# Patient Record
Sex: FEMALE | Race: WHITE | NOT HISPANIC OR LATINO | Employment: OTHER | URBAN - METROPOLITAN AREA
[De-identification: names, ages, dates, MRNs, and addresses within clinical notes are randomized per-mention and may not be internally consistent; named-entity substitution may affect disease eponyms.]

---

## 2017-11-14 ENCOUNTER — OFFICE VISIT (OUTPATIENT)
Dept: SLEEP MEDICINE | Facility: HOSPITAL | Age: 62
End: 2017-11-14
Attending: INTERNAL MEDICINE

## 2017-11-14 VITALS
WEIGHT: 283 LBS | DIASTOLIC BLOOD PRESSURE: 73 MMHG | HEIGHT: 61 IN | SYSTOLIC BLOOD PRESSURE: 152 MMHG | HEART RATE: 75 BPM | BODY MASS INDEX: 53.43 KG/M2

## 2017-11-14 DIAGNOSIS — G47.33 OBSTRUCTIVE SLEEP APNEA, ADULT: Primary | ICD-10-CM

## 2017-11-14 DIAGNOSIS — I27.81 COR PULMONALE (HCC): ICD-10-CM

## 2017-11-14 DIAGNOSIS — I10 ESSENTIAL HYPERTENSION, MALIGNANT: ICD-10-CM

## 2017-11-14 PROCEDURE — G0463 HOSPITAL OUTPT CLINIC VISIT: HCPCS

## 2017-11-14 NOTE — PROGRESS NOTES
PULMONARY SLEEP CONSULT NOTE      PATIENT IDENTIFICATION:  Name: Scarlett Ray  Age: 62 y.o.  Sex: female  :  1955  MRN: [id number]    DATE OF CONSULTATION:  2017   Referring Physician: No admitting provider for patient encounter.                  CHIEF COMPLAINT: Obstructive Sleep Apnea    History of Present Illness:   Scarlett Ray is a 62 y.o. female  Pt on CPAP feeling better more energy especially the night he use it more than 4 hours, no sleepiness no fatigue no tiredness, no mask irritation no dryness, compliance report reviewed with pt AHI< 5 with good usage.       Review of Systems:   Constitutional: negative   Eyes: negative   ENT/oropharynx: negative   Cardiovascular: negative   Respiratory: negative   Gastrointestinal: negative   Genitourinary: negative   Neurological: negative   Musculoskeletal: negative   Integument/breast: negative   Endocrine: negative   Allergic/Immunologic: negative     Past Medical History:  No past medical history on file.    Past Surgical History:  No past surgical history on file.     Family History:  No family history on file.     Social History:   Social History   Substance Use Topics   • Smoking status: Not on file   • Smokeless tobacco: Not on file   • Alcohol use Not on file        Allergies:  Allergies not on file    Home Meds:    (Not in a hospital admission)    Objective:    Vitals Ranges:   Heart Rate:  [75] 75  BP: (152)/(73) 152/73  Body mass index is 54.36 kg/(m^2).         Exam:    General Appearance:      Head:  Normocephalic, without obvious abnormality, atraumatic.   Eyes:  Conjunctiva/corneas clear, EOM's intact, both eyes.         Ears:  Normal external ear canals, both ears.    Nose:  Nares normal, no drainage      Throat:  Lips, mucosa, and tongue normal. Mallampati score 3    Neck:  Supple, symmetrical, trachea midline. No JVD.  Lungs:   Bilateral basal rhonchi bilaterally, respirations unlabored symmetrical wall movement.    Chest wall:   No tenderness or deformity.    Heart:  Regular rate and rhythm, S1 and S2 normal.  Abdomen: Soft, non-tender, no masses, no organomegaly.    Extremities: Trace edema no clubbing or Cyanosis        Data Review:  All labs (24hrs): No results found for this or any previous visit (from the past 24 hour(s)).     Imaging:  [unfilled]    ASSESSMENT:  Diagnoses and all orders for this visit:    Obstructive sleep apnea, adult    Essential hypertension, malignant    Cor pulmonale        PLAN:   This is patient with symptoms of obstructive sleep apnea, NPSG study ASAP / split night study, Avoid supine avoid sedative meds in pm, weight loss, Avoid driving. Long discussion with patient about the physiology of NONI, and long term and short term benefit of treating NONI   Treating NONI will improve BP control  It is recommended to keep thyroid function optimized to improve quality of sleep        Car Garcia MD. D, ABSM.  11/14/2017  10:49 AM

## 2018-01-29 ENCOUNTER — TELEPHONE (OUTPATIENT)
Dept: SLEEP MEDICINE | Facility: HOSPITAL | Age: 63
End: 2018-01-29

## 2018-02-05 ENCOUNTER — TELEPHONE (OUTPATIENT)
Dept: SLEEP MEDICINE | Facility: HOSPITAL | Age: 63
End: 2018-02-05

## 2018-02-06 NOTE — TELEPHONE ENCOUNTER
Pt left vm stating to return call. Tech called pt per pt [facility] is out of network with insurance and so is [facility]. Pt requested to send supply order over to [facility]. MAB

## 2018-04-30 ENCOUNTER — HOSPITAL ENCOUNTER (OUTPATIENT)
Dept: ULTRASOUND IMAGING | Facility: HOSPITAL | Age: 63
Discharge: HOME | End: 2018-04-30

## 2018-04-30 ENCOUNTER — TRANSCRIBE ORDERS (OUTPATIENT)
Dept: ADMINISTRATIVE | Facility: HOSPITAL | Age: 63
End: 2018-04-30

## 2018-04-30 DIAGNOSIS — R60.9 EDEMA, UNSPECIFIED TYPE: Primary | ICD-10-CM

## 2018-04-30 DIAGNOSIS — R60.9 EDEMA, UNSPECIFIED TYPE: ICD-10-CM

## 2018-04-30 PROCEDURE — 93971 EXTREMITY STUDY: CPT

## 2018-11-27 ENCOUNTER — OFFICE VISIT (OUTPATIENT)
Dept: SLEEP MEDICINE | Facility: HOSPITAL | Age: 63
End: 2018-11-27
Attending: INTERNAL MEDICINE

## 2018-11-27 VITALS
DIASTOLIC BLOOD PRESSURE: 83 MMHG | HEART RATE: 82 BPM | HEIGHT: 62 IN | WEIGHT: 262 LBS | SYSTOLIC BLOOD PRESSURE: 144 MMHG | BODY MASS INDEX: 48.21 KG/M2

## 2018-11-27 DIAGNOSIS — I10 ESSENTIAL HYPERTENSION, MALIGNANT: ICD-10-CM

## 2018-11-27 DIAGNOSIS — G47.33 OBSTRUCTIVE SLEEP APNEA, ADULT: Primary | ICD-10-CM

## 2018-11-27 DIAGNOSIS — I27.81 COR PULMONALE (HCC): ICD-10-CM

## 2018-11-27 PROCEDURE — G0463 HOSPITAL OUTPT CLINIC VISIT: HCPCS

## 2018-11-27 NOTE — PROGRESS NOTES
PULMONARY SLEEP CONSULT NOTE      PATIENT IDENTIFICATION:  Name: Scarlett Ray  Age: 63 y.o.  Sex: female  :  1955  MRN: [id number]    DATE OF CONSULTATION:  2018   Referring Physician: No admitting provider for patient encounter.                  CHIEF COMPLAINT: Obstructive Sleep Apnea    History of Present Illness:   Scarlett Ray is a 63 y.o. female Pt on CPAP feeling better more energy especially the night he use it more than 4 hours, no sleepiness no fatigue no tiredness, no mask irritation no dryness, compliance report reviewed with pt AHI< 5 with good usage.       Review of Systems:   Constitutional: negative   Eyes: negative   ENT/oropharynx: negative   Cardiovascular: negative   Respiratory: negative   Gastrointestinal: negative   Genitourinary: negative   Neurological: negative   Musculoskeletal: negative   Integument/breast: negative   Endocrine: negative   Allergic/Immunologic: negative     Past Medical History:  No past medical history on file.    Past Surgical History:  No past surgical history on file.     Family History:  No family history on file.     Social History:   Social History     Tobacco Use   • Smoking status: Not on file   Substance Use Topics   • Alcohol use: Not on file        Allergies:  Allergies not on file    Home Meds:    (Not in a hospital admission)    Objective:    Vitals Ranges:   Heart Rate:  [82] 82  BP: (144)/(83) 144/83  Body mass index is 47.92 kg/m².     Exam:  General Appearance:  WDWN    HEENT:   without obvious abnormality,  Conjunctiva/corneas clear,  Normal external ear canals, no drainage    Clear orsalmucosa,  Mallampati score 3    Neck:  Supple, symmetrical, trachea midline. No JVD.  Lungs:   Bilateral basal rhonchi bilaterally, respirations unlabored symmetrical wall movement.    Chest wall:  No tenderness or deformity.    Heart:  Regular rate and rhythm, S1 and S2 normal.  Extremities: Trace edema no clubbing or Cyanosis        Data  Review:  All labs (24hrs): No results found for this or any previous visit (from the past 24 hour(s)).     Imaging:  [unfilled]    ASSESSMENT:  Diagnoses and all orders for this visit:    Obstructive sleep apnea, adult    Cor pulmonale (CMS/HCC)    Essential hypertension, malignant        PLAN:  This patient with obstructive sleep apnea, compliance is improved. Encourage to use it more frequent, I re-emphasized on pt the long and short term benefit of treating NONI.     Treating NONI will improve BP control    Car Garcia MD. D, ABSM.  11/27/2018  11:48 AM

## 2019-09-13 ENCOUNTER — OFFICE VISIT (OUTPATIENT)
Dept: RETAIL CLINIC | Facility: CLINIC | Age: 64
End: 2019-09-13

## 2019-09-13 VITALS
HEART RATE: 92 BPM | TEMPERATURE: 98.6 F | SYSTOLIC BLOOD PRESSURE: 150 MMHG | RESPIRATION RATE: 18 BRPM | OXYGEN SATURATION: 94 % | DIASTOLIC BLOOD PRESSURE: 80 MMHG

## 2019-09-13 DIAGNOSIS — J40 BRONCHITIS: Primary | ICD-10-CM

## 2019-09-13 PROCEDURE — 99213 OFFICE O/P EST LOW 20 MIN: CPT | Performed by: NURSE PRACTITIONER

## 2019-09-13 RX ORDER — DEXTROMETHORPHAN HYDROBROMIDE AND PROMETHAZINE HYDROCHLORIDE 15; 6.25 MG/5ML; MG/5ML
5 SYRUP ORAL NIGHTLY PRN
Qty: 50 ML | Refills: 0 | Status: SHIPPED | OUTPATIENT
Start: 2019-09-13 | End: 2019-09-23

## 2019-09-13 RX ORDER — PREDNISONE 20 MG/1
20 TABLET ORAL 2 TIMES DAILY
Qty: 10 TABLET | Refills: 0 | Status: SHIPPED | OUTPATIENT
Start: 2019-09-13 | End: 2019-09-18

## 2019-09-13 RX ORDER — GUAIFENESIN 600 MG/1
1200 TABLET, EXTENDED RELEASE ORAL EVERY MORNING
Qty: 20 TABLET | Refills: 0
Start: 2019-09-13 | End: 2019-09-23

## 2019-09-13 RX ORDER — METOPROLOL TARTRATE 100 MG/1
100 TABLET ORAL DAILY
Refills: 2 | Status: ON HOLD | COMMUNITY
Start: 2019-08-26 | End: 2022-05-04 | Stop reason: SDUPTHER

## 2019-09-13 RX ORDER — ALLOPURINOL 300 MG/1
300 TABLET ORAL DAILY
COMMUNITY
End: 2020-12-12

## 2019-09-13 RX ORDER — AMOXICILLIN 875 MG/1
TABLET, COATED ORAL
Refills: 0 | COMMUNITY
Start: 2019-08-27 | End: 2020-12-12

## 2019-09-13 RX ORDER — SPIRONOLACTONE 25 MG/1
25 TABLET ORAL DAILY
Refills: 2 | COMMUNITY
Start: 2019-08-26 | End: 2022-05-04 | Stop reason: HOSPADM

## 2019-09-13 RX ORDER — ALBUTEROL SULFATE 90 UG/1
2 AEROSOL, METERED RESPIRATORY (INHALATION) EVERY 6 HOURS PRN
Refills: 0 | COMMUNITY
Start: 2019-08-22

## 2019-09-13 RX ORDER — AZITHROMYCIN 250 MG/1
TABLET, FILM COATED ORAL
Qty: 6 TABLET | Refills: 0 | Status: SHIPPED | OUTPATIENT
Start: 2019-09-13 | End: 2020-12-12

## 2019-09-13 RX ORDER — INFLUENZA A VIRUS A/SINGAPORE/GP1908/2015 IVR-180 (H1N1) ANTIGEN (MDCK CELL DERIVED, PROPIOLACTONE INACTIVATED), INFLUENZA A VIRUS A/NORTH CAROLINA/04/2016 (H3N2) HEMAGGLUTININ ANTIGEN (MDCK CELL DERIVED, PROPIOLACTONE INACTIVATED), INFLUENZA B VIRUS B/IOWA/06/2017 HEMAGGLUTININ ANTIGEN (MDCK CELL DERIVED, PROPIOLACTONE INACTIVATED), INFLUENZA B VIRUS B/SINGAPORE/INFTT-16-0610/2016 HEMAGGLUTININ ANTIGEN (MDCK CELL DERIVED, PROPIOLACTONE INACTIVATED) 15; 15; 15; 15 UG/.5ML; UG/.5ML; UG/.5ML; UG/.5ML
INJECTION, SUSPENSION INTRAMUSCULAR
COMMUNITY
Start: 2019-09-12 | End: 2019-09-13

## 2019-09-13 RX ORDER — SIMVASTATIN 20 MG
20 TABLET ORAL
Refills: 2 | COMMUNITY
Start: 2019-08-26 | End: 2022-05-04 | Stop reason: HOSPADM

## 2019-09-13 NOTE — PROGRESS NOTES
"Elyse Ray is a 63 y.o. female.     Cough   This is a new problem. Episode onset: 3 weeks ago. The problem has been gradually worsening (in past week). The problem occurs every few minutes. The cough is productive of sputum. Associated symptoms include a fever (not measured but reports feeling \"hot and cold\" in past week ), headaches, nasal congestion, postnasal drip, rhinorrhea and a sore throat. Pertinent negatives include no chest pain, chills, ear congestion, ear pain, eye redness, heartburn, hemoptysis, myalgias, shortness of breath, sweats or wheezing. The symptoms are aggravated by lying down. She has tried a beta-agonist inhaler (pt finished amoxicillin course 9/6 prescribed by PCP-her symptoms did not improve with use, she has been using her albuterol inhaler daily x7 days with mild/temporary improvement of cough) for the symptoms. The treatment provided mild relief. Her past medical history is significant for asthma and bronchitis. There is no history of COPD, environmental allergies or pneumonia. Sleep apnea-has not been wearing CPAP at  for 2-3 months       The following portions of the patient's history were reviewed and updated as appropriate: allergies, current medications, past medical history, past social history, past surgical history and problem list.    Review of Systems   Constitutional: Positive for fatigue and fever (not measured but reports feeling \"hot and cold\" in past week ). Negative for appetite change, chills and diaphoresis.   HENT: Positive for congestion, postnasal drip, rhinorrhea, sinus pressure and sore throat. Negative for ear discharge, ear pain, mouth sores, sneezing, trouble swallowing and voice change.    Eyes: Negative for redness and itching.   Respiratory: Positive for cough and chest tightness. Negative for hemoptysis, shortness of breath and wheezing.    Cardiovascular: Negative for chest pain.   Gastrointestinal: Negative for diarrhea, heartburn, " nausea and vomiting.   Musculoskeletal: Negative for myalgias and neck pain.   Allergic/Immunologic: Positive for immunocompromised state. Negative for environmental allergies.   Neurological: Positive for headaches. Negative for dizziness.       Objective   Physical Exam   Constitutional: She is oriented to person, place, and time. She is cooperative. She does not appear ill. No distress.   HENT:   Right Ear: Tympanic membrane, external ear and ear canal normal.   Left Ear: Tympanic membrane, external ear and ear canal normal.   Nose: Rhinorrhea present. No mucosal edema. Right sinus exhibits no maxillary sinus tenderness and no frontal sinus tenderness. Left sinus exhibits no maxillary sinus tenderness and no frontal sinus tenderness.   Mouth/Throat: Uvula is midline and mucous membranes are normal. No oral lesions. No uvula swelling. Posterior oropharyngeal erythema present. No oropharyngeal exudate or posterior oropharyngeal edema. Tonsils are 1+ on the right. Tonsils are 1+ on the left. No tonsillar exudate.   Eyes: Conjunctivae and lids are normal.   Cardiovascular: Normal rate, regular rhythm, S1 normal and S2 normal.   Pulmonary/Chest: Effort normal. She has decreased breath sounds in the right middle field, the right lower field, the left middle field and the left lower field. She has no wheezes. She has rhonchi in the left upper field. She has no rales.   Coughing during deep breathing exercises  Rhonchi resolved after forceful cough   Lymphadenopathy:     She has no cervical adenopathy.   Neurological: She is alert and oriented to person, place, and time.   Vitals reviewed.      Assessment/Plan   Scarlett was seen today for cough and sore throat.    Diagnoses and all orders for this visit:    Bronchitis  -     predniSONE (DELTASONE) 20 MG tablet; Take 1 tablet by mouth 2 (Two) Times a Day for 5 days.  -     azithromycin (ZITHROMAX Z-JOSIAH) 250 MG tablet; Take 2 tablets the first day, then 1 tablet daily for 4  days.  -     promethazine-dextromethorphan (PROMETHAZINE-DM) 6.25-15 MG/5ML syrup; Take 5 mL by mouth At Night As Needed for Cough for up to 10 days.  -     guaiFENesin (MUCINEX) 600 MG 12 hr tablet; Take 2 tablets by mouth Every Morning for 10 days.          -     Discussed to use promethazine-DM sparingly, check BP and glucose more frequently while on prednisone, increase H2O intake with mucinex use        -     Albuterol 2 puffs Q4 hours while awake x 48 hours then resume PRN frequency        -     F/U with PCP for persistent symptoms or UC/ER for worsening symptoms

## 2020-03-11 ENCOUNTER — TRANSCRIBE ORDERS (OUTPATIENT)
Dept: ADMINISTRATIVE | Facility: HOSPITAL | Age: 65
End: 2020-03-11

## 2020-03-11 DIAGNOSIS — Z78.0 MENOPAUSE: Primary | ICD-10-CM

## 2020-03-18 ENCOUNTER — APPOINTMENT (OUTPATIENT)
Dept: BONE DENSITY | Facility: HOSPITAL | Age: 65
End: 2020-03-18

## 2020-03-18 DIAGNOSIS — Z78.0 MENOPAUSE: ICD-10-CM

## 2020-03-18 PROCEDURE — 77080 DXA BONE DENSITY AXIAL: CPT

## 2021-10-02 ENCOUNTER — APPOINTMENT (OUTPATIENT)
Dept: GENERAL RADIOLOGY | Facility: HOSPITAL | Age: 66
End: 2021-10-02

## 2021-10-02 ENCOUNTER — HOSPITAL ENCOUNTER (EMERGENCY)
Facility: HOSPITAL | Age: 66
Discharge: HOME | End: 2021-10-02
Attending: EMERGENCY MEDICINE | Admitting: EMERGENCY MEDICINE

## 2021-10-02 VITALS
HEIGHT: 63 IN | HEART RATE: 105 BPM | OXYGEN SATURATION: 95 % | BODY MASS INDEX: 46.42 KG/M2 | RESPIRATION RATE: 18 BRPM | DIASTOLIC BLOOD PRESSURE: 101 MMHG | TEMPERATURE: 99 F | SYSTOLIC BLOOD PRESSURE: 191 MMHG | WEIGHT: 262 LBS

## 2021-10-02 DIAGNOSIS — J20.9 ACUTE BRONCHITIS, UNSPECIFIED ORGANISM: Primary | ICD-10-CM

## 2021-10-02 LAB
ALBUMIN SERPL-MCNC: 4.3 G/DL (ref 3.5–5.2)
ALBUMIN/GLOB SERPL: 1.3 G/DL
ALP SERPL-CCNC: 219 U/L (ref 39–117)
ALT SERPL W P-5'-P-CCNC: 8 U/L (ref 1–33)
ANION GAP SERPL CALCULATED.3IONS-SCNC: 11.8 MMOL/L (ref 5–15)
AST SERPL-CCNC: 13 U/L (ref 1–32)
BASOPHILS # BLD AUTO: 0.05 10*3/MM3 (ref 0–0.2)
BASOPHILS NFR BLD AUTO: 0.4 % (ref 0–1.5)
BILIRUB SERPL-MCNC: 0.4 MG/DL (ref 0–1.2)
BUN SERPL-MCNC: 14 MG/DL (ref 8–23)
BUN/CREAT SERPL: 12.8 (ref 7–25)
CALCIUM SPEC-SCNC: 9.8 MG/DL (ref 8.6–10.5)
CHLORIDE SERPL-SCNC: 99 MMOL/L (ref 98–107)
CO2 SERPL-SCNC: 24.2 MMOL/L (ref 22–29)
CREAT SERPL-MCNC: 1.09 MG/DL (ref 0.57–1)
D DIMER PPP FEU-MCNC: 0.47 MCGFEU/ML (ref 0–0.46)
DEPRECATED RDW RBC AUTO: 43.8 FL (ref 37–54)
EOSINOPHIL # BLD AUTO: 0.39 10*3/MM3 (ref 0–0.4)
EOSINOPHIL NFR BLD AUTO: 3.3 % (ref 0.3–6.2)
ERYTHROCYTE [DISTWIDTH] IN BLOOD BY AUTOMATED COUNT: 13.5 % (ref 12.3–15.4)
FLUAV AG NPH QL: NEGATIVE
FLUBV AG NPH QL IA: NEGATIVE
GFR SERPL CREATININE-BSD FRML MDRD: 50 ML/MIN/1.73
GLOBULIN UR ELPH-MCNC: 3.4 GM/DL
GLUCOSE SERPL-MCNC: 134 MG/DL (ref 65–99)
HCT VFR BLD AUTO: 45.9 % (ref 34–46.6)
HGB BLD-MCNC: 14.7 G/DL (ref 12–15.9)
IMM GRANULOCYTES # BLD AUTO: 0.03 10*3/MM3 (ref 0–0.05)
IMM GRANULOCYTES NFR BLD AUTO: 0.3 % (ref 0–0.5)
LYMPHOCYTES # BLD AUTO: 1.67 10*3/MM3 (ref 0.7–3.1)
LYMPHOCYTES NFR BLD AUTO: 14.3 % (ref 19.6–45.3)
MCH RBC QN AUTO: 28.1 PG (ref 26.6–33)
MCHC RBC AUTO-ENTMCNC: 32 G/DL (ref 31.5–35.7)
MCV RBC AUTO: 87.8 FL (ref 79–97)
MONOCYTES # BLD AUTO: 1.05 10*3/MM3 (ref 0.1–0.9)
MONOCYTES NFR BLD AUTO: 9 % (ref 5–12)
NEUTROPHILS NFR BLD AUTO: 72.7 % (ref 42.7–76)
NEUTROPHILS NFR BLD AUTO: 8.52 10*3/MM3 (ref 1.7–7)
NT-PROBNP SERPL-MCNC: 106.5 PG/ML (ref 0–900)
PLATELET # BLD AUTO: 281 10*3/MM3 (ref 140–450)
PMV BLD AUTO: 10.3 FL (ref 6–12)
POTASSIUM SERPL-SCNC: 4.3 MMOL/L (ref 3.5–5.2)
PROCALCITONIN SERPL-MCNC: 0.09 NG/ML (ref 0–0.25)
PROT SERPL-MCNC: 7.7 G/DL (ref 6–8.5)
QT INTERVAL: 355 MS
RBC # BLD AUTO: 5.23 10*6/MM3 (ref 3.77–5.28)
SARS-COV-2 RNA PNL SPEC NAA+PROBE: NOT DETECTED
SODIUM SERPL-SCNC: 135 MMOL/L (ref 136–145)
TROPONIN T SERPL-MCNC: <0.01 NG/ML (ref 0–0.03)
WBC # BLD AUTO: 11.71 10*3/MM3 (ref 3.4–10.8)

## 2021-10-02 PROCEDURE — 85025 COMPLETE CBC W/AUTO DIFF WBC: CPT | Performed by: EMERGENCY MEDICINE

## 2021-10-02 PROCEDURE — 83880 ASSAY OF NATRIURETIC PEPTIDE: CPT | Performed by: EMERGENCY MEDICINE

## 2021-10-02 PROCEDURE — 93010 ELECTROCARDIOGRAM REPORT: CPT | Performed by: INTERNAL MEDICINE

## 2021-10-02 PROCEDURE — 84484 ASSAY OF TROPONIN QUANT: CPT | Performed by: EMERGENCY MEDICINE

## 2021-10-02 PROCEDURE — 85379 FIBRIN DEGRADATION QUANT: CPT | Performed by: EMERGENCY MEDICINE

## 2021-10-02 PROCEDURE — 63710000001 PREDNISONE PER 1 MG: Performed by: EMERGENCY MEDICINE

## 2021-10-02 PROCEDURE — 80053 COMPREHEN METABOLIC PANEL: CPT | Performed by: EMERGENCY MEDICINE

## 2021-10-02 PROCEDURE — 87804 INFLUENZA ASSAY W/OPTIC: CPT | Performed by: EMERGENCY MEDICINE

## 2021-10-02 PROCEDURE — 99282 EMERGENCY DEPT VISIT SF MDM: CPT | Performed by: EMERGENCY MEDICINE

## 2021-10-02 PROCEDURE — 71046 X-RAY EXAM CHEST 2 VIEWS: CPT

## 2021-10-02 PROCEDURE — 87635 SARS-COV-2 COVID-19 AMP PRB: CPT | Performed by: EMERGENCY MEDICINE

## 2021-10-02 PROCEDURE — 93005 ELECTROCARDIOGRAM TRACING: CPT | Performed by: EMERGENCY MEDICINE

## 2021-10-02 PROCEDURE — 99283 EMERGENCY DEPT VISIT LOW MDM: CPT

## 2021-10-02 PROCEDURE — 84145 PROCALCITONIN (PCT): CPT | Performed by: EMERGENCY MEDICINE

## 2021-10-02 RX ORDER — PREDNISONE 20 MG/1
60 TABLET ORAL ONCE
Status: COMPLETED | OUTPATIENT
Start: 2021-10-02 | End: 2021-10-02

## 2021-10-02 RX ORDER — AZITHROMYCIN 250 MG/1
TABLET, FILM COATED ORAL
Qty: 6 TABLET | Refills: 0 | Status: ON HOLD | OUTPATIENT
Start: 2021-10-02 | End: 2022-04-28

## 2021-10-02 RX ORDER — METHYLPREDNISOLONE 4 MG/1
TABLET ORAL
Qty: 21 TABLET | Refills: 0 | Status: ON HOLD | OUTPATIENT
Start: 2021-10-02 | End: 2022-04-28

## 2021-10-02 RX ORDER — BENZONATATE 100 MG/1
100 CAPSULE ORAL ONCE
Status: COMPLETED | OUTPATIENT
Start: 2021-10-02 | End: 2021-10-02

## 2021-10-02 RX ORDER — BENZONATATE 200 MG/1
200 CAPSULE ORAL 3 TIMES DAILY PRN
Qty: 30 CAPSULE | Refills: 0 | Status: ON HOLD | OUTPATIENT
Start: 2021-10-02 | End: 2022-04-30

## 2021-10-02 RX ADMIN — PREDNISONE 60 MG: 20 TABLET ORAL at 11:55

## 2021-10-02 RX ADMIN — BENZONATATE 100 MG: 100 CAPSULE ORAL at 11:55

## 2021-10-02 NOTE — ED PROVIDER NOTES
Subjective   History of Present Illness  History of Present Illness    Chief complaint: Coughing    Location: Home    Quality/Severity: Productive    Timing/Onset/Duration: 2 weeks ago    Modifying Factors: Nothing makes it better    Associated Symptoms: Mild headache.  T-max of 100.9 last night, no earache.  The patient has a mild sore throat.  No nasal congestion.  No chest pain.  No abdominal pain.  No diarrhea or burning when she urinates.  No nausea or vomiting.    Narrative: This 65-year-old white female presents with fever and coughing.  She has a history of bronchitis.  She is not a smoker.  She has a history of asthma.  She was vaccinated with Sae & Sae in March    PCP:Jeffry Espino MD    Review of Systems   Constitutional: Positive for chills and fever.   HENT: Positive for sore throat. Negative for congestion and ear pain.    Respiratory: Positive for shortness of breath.    Cardiovascular: Negative for chest pain.   Gastrointestinal: Negative for abdominal pain, diarrhea, nausea and vomiting.   Genitourinary: Negative for difficulty urinating.   Skin: Negative for rash.   Neurological: Positive for headaches. Negative for weakness and numbness.        Medication List      ASK your doctor about these medications    albuterol sulfate  (90 Base) MCG/ACT inhaler  Commonly known as: PROVENTIL HFA;VENTOLIN HFA;PROAIR HFA     carbamide peroxide 6.5 % otic solution  Commonly known as: DEBROX  Administer 5 drops into both ears 2 (Two) Times a Day.     METFORMIN HCL PO     metoprolol tartrate 100 MG tablet  Commonly known as: LOPRESSOR     simvastatin 20 MG tablet  Commonly known as: ZOCOR     spironolactone 25 MG tablet  Commonly known as: ALDACTONE            Past Medical History:   Diagnosis Date   • Allergic    • Asthma    • Elevated cholesterol    • Hypertension    • Type 2 diabetes mellitus (HCC)        Allergies   Allergen Reactions   • Ace Inhibitors Anaphylaxis   • Latex Hives        History reviewed. No pertinent surgical history.    History reviewed. No pertinent family history.    Social History     Socioeconomic History   • Marital status:      Spouse name: Not on file   • Number of children: Not on file   • Years of education: Not on file   • Highest education level: Not on file   Tobacco Use   • Smoking status: Never Smoker   • Smokeless tobacco: Never Used   Vaping Use   • Vaping Use: Never used   Substance and Sexual Activity   • Alcohol use: No   • Drug use: No   • Sexual activity: Defer           Objective   Physical Exam  Vitals (The temperature is 99 °F, pulse 105, respirations 18, blood pressure 185/81, room air sat 95%) reviewed.   Constitutional:       Appearance: She is well-developed.   HENT:      Head: Normocephalic and atraumatic.      Mouth/Throat:      Mouth: Mucous membranes are moist.   Eyes:      Extraocular Movements: Extraocular movements intact.      Pupils: Pupils are equal, round, and reactive to light.   Neck:      Comments: No meningeal signs  Cardiovascular:      Rate and Rhythm: Normal rate and regular rhythm.      Heart sounds: Normal heart sounds. No murmur heard.   No friction rub. No gallop.    Pulmonary:      Effort: Pulmonary effort is normal.      Breath sounds: Normal breath sounds.   Chest:      Chest wall: No tenderness.   Abdominal:      General: Bowel sounds are normal.      Palpations: Abdomen is soft. There is no mass.      Tenderness: There is no abdominal tenderness. There is no guarding or rebound.   Musculoskeletal:         General: Normal range of motion.      Cervical back: Normal range of motion and neck supple.      Right lower leg: No edema.      Left lower leg: No edema.   Skin:     General: Skin is warm and dry.      Findings: No rash.   Neurological:      General: No focal deficit present.      Mental Status: She is alert and oriented to person, place, and time.      Cranial Nerves: No cranial nerve deficit.      Motor: No  weakness.         Procedures           ED Course  ED Course as of Oct 02 1143   Sat Oct 02, 2021   1140 The laboratory values reviewed by me.  The D-dimer is 0.47 the white blood cell count is 11.7.  The glucose is 134, the creatinine is 1.09.  The sodium is 135, the alk phos is 219, the GFR is 50.  The laboratory values are otherwise unremarkable    [RC]      ED Course User Index  [RC] Jeffry Matthew MD      10:50 EDT, 10/02/21: The EKG was read by me at 1032 and obtained at 1029.  The EKG shows a normal sinus rhythm with rate of 99.  There is a normal axis with no hypertrophy.  The VA, QRS, QT intervals are unremarkable.  There PACs.  There is no acute ST elevation or depression.            11:43 EDT, 10/02/21:  The patient was reassessed.  She has no new complaints.  Her vital signs reviewed and are stable.  Her blood pressure is 191/101.  The patient's not had any of her blood pressure medicines today lung exam: Clear to auscultation equal bilaterally    11:44 EDT, 10/02/21:  The patient's diagnosis of acute bronchitis was discussed with her.  The patient will be placed on Zithromax.  I will place her on a Medrol Dosepak.  She should use her inhaler as needed as directed for shortness of breath she should follow-up with Dr. Espino in 1 week.  She should return to the emergency department if there is worsening shortness of breath, chest pain, worse in any way at all the patient be given a prescription for Tessalon Perles all the patient question answered she will be discharged in good condition.  The patient has been advised to take her blood pressure medicines this morning          MDM    Final diagnoses:   Acute bronchitis, unspecified organism       ED Disposition  ED Disposition     None          No follow-up provider specified.       Medication List      No changes were made to your prescriptions during this visit.          Jeffry Matthew MD  10/02/21 1145

## 2021-10-02 NOTE — DISCHARGE INSTRUCTIONS
Follow-up with  in 1 week.  Return to the emergency department if there is worsening shortness of breath, chest pain, worsening way at all take your blood pressure medications as prescribed.  Use your inhaler as needed as directed for shortness of breath.

## 2022-03-14 ENCOUNTER — TRANSCRIBE ORDERS (OUTPATIENT)
Dept: ADMINISTRATIVE | Facility: HOSPITAL | Age: 67
End: 2022-03-14

## 2022-03-14 DIAGNOSIS — Z78.0 MENOPAUSE: Primary | ICD-10-CM

## 2022-04-28 ENCOUNTER — HOSPITAL ENCOUNTER (EMERGENCY)
Facility: HOSPITAL | Age: 67
Discharge: ANOTHER HOSPITAL | End: 2022-04-28
Attending: EMERGENCY MEDICINE | Admitting: EMERGENCY MEDICINE

## 2022-04-28 ENCOUNTER — APPOINTMENT (OUTPATIENT)
Dept: GENERAL RADIOLOGY | Facility: HOSPITAL | Age: 67
End: 2022-04-28

## 2022-04-28 ENCOUNTER — APPOINTMENT (OUTPATIENT)
Dept: CARDIOLOGY | Facility: HOSPITAL | Age: 67
End: 2022-04-28

## 2022-04-28 ENCOUNTER — HOSPITAL ENCOUNTER (INPATIENT)
Facility: HOSPITAL | Age: 67
LOS: 6 days | Discharge: HOME | End: 2022-05-04
Attending: INTERNAL MEDICINE

## 2022-04-28 VITALS
DIASTOLIC BLOOD PRESSURE: 99 MMHG | RESPIRATION RATE: 22 BRPM | SYSTOLIC BLOOD PRESSURE: 182 MMHG | HEIGHT: 62 IN | TEMPERATURE: 97.9 F | OXYGEN SATURATION: 94 % | WEIGHT: 262 LBS | HEART RATE: 70 BPM | BODY MASS INDEX: 48.21 KG/M2

## 2022-04-28 DIAGNOSIS — I20.0 UNSTABLE ANGINA: Primary | ICD-10-CM

## 2022-04-28 DIAGNOSIS — Z95.1 S/P CABG (CORONARY ARTERY BYPASS GRAFT): ICD-10-CM

## 2022-04-28 DIAGNOSIS — R07.9 CHEST PAIN, UNSPECIFIED TYPE: Primary | ICD-10-CM

## 2022-04-28 DIAGNOSIS — R09.02 HYPOXIA: ICD-10-CM

## 2022-04-28 DIAGNOSIS — I25.110 CORONARY ARTERY DISEASE INVOLVING NATIVE CORONARY ARTERY OF NATIVE HEART WITH UNSTABLE ANGINA PECTORIS: Primary | ICD-10-CM

## 2022-04-28 DIAGNOSIS — R07.9 CHEST PAIN, UNSPECIFIED TYPE: ICD-10-CM

## 2022-04-28 DIAGNOSIS — R93.1 ABNORMAL FINDINGS ON DIAGNOSTIC IMAGING OF HEART AND CORONARY CIRCULATION: ICD-10-CM

## 2022-04-28 LAB
ABO GROUP BLD: NORMAL
ALBUMIN SERPL-MCNC: 3.8 G/DL (ref 3.5–5.2)
ALBUMIN SERPL-MCNC: 4.1 G/DL (ref 3.5–5.2)
ALBUMIN/GLOB SERPL: 1.1 G/DL
ALBUMIN/GLOB SERPL: 1.2 G/DL
ALP SERPL-CCNC: 160 U/L (ref 39–117)
ALP SERPL-CCNC: 183 U/L (ref 39–117)
ALT SERPL W P-5'-P-CCNC: 10 U/L (ref 1–33)
ALT SERPL W P-5'-P-CCNC: 13 U/L (ref 1–33)
ANION GAP SERPL CALCULATED.3IONS-SCNC: 10.4 MMOL/L (ref 5–15)
ANION GAP SERPL CALCULATED.3IONS-SCNC: 12.5 MMOL/L (ref 5–15)
APTT PPP: 31.9 SECONDS (ref 22.7–35.4)
ARTERIAL PATENCY WRIST A: ABNORMAL
AST SERPL-CCNC: 14 U/L (ref 1–32)
AST SERPL-CCNC: 16 U/L (ref 1–32)
ATMOSPHERIC PRESS: 754.1 MMHG
BACTERIA UR QL AUTO: ABNORMAL /HPF
BASE EXCESS BLDA CALC-SCNC: 1.1 MMOL/L (ref 0–2)
BASOPHILS # BLD AUTO: 0.05 10*3/MM3 (ref 0–0.2)
BASOPHILS # BLD AUTO: 0.09 10*3/MM3 (ref 0–0.2)
BASOPHILS NFR BLD AUTO: 0.6 % (ref 0–1.5)
BASOPHILS NFR BLD AUTO: 0.8 % (ref 0–1.5)
BDY SITE: ABNORMAL
BH CV XLRA MEAS - DIST GSV CALF DIST LEFT: 0.16 CM
BH CV XLRA MEAS - DIST GSV CALF DIST RIGHT: 0.22 CM
BH CV XLRA MEAS - DIST GSV THIGH DIST LEFT: 0.29 CM
BH CV XLRA MEAS - DIST GSV THIGH DIST RIGHT: 0.32 CM
BH CV XLRA MEAS - DIST LSV CALF DIST LEFT: 0.16 CM
BH CV XLRA MEAS - DIST LSV CALF DIST RIGHT: 0.08 CM
BH CV XLRA MEAS - GSV ANKLE DIST LEFT: 0.14 CM
BH CV XLRA MEAS - GSV ANKLE DIST RIGHT: 0.24 CM
BH CV XLRA MEAS - GSV KNEE DIST LEFT: 0.36 CM
BH CV XLRA MEAS - GSV KNEE DIST RIGHT: 0.32 CM
BH CV XLRA MEAS - GSV ORIGIN DIST LEFT: 0.96 CM
BH CV XLRA MEAS - GSV ORIGIN DIST RIGHT: 0.46 CM
BH CV XLRA MEAS - MID GSV CALF LEFT: 0.21 CM
BH CV XLRA MEAS - MID GSV CALF RIGHT: 0.21 CM
BH CV XLRA MEAS - MID GSV THIGH  LEFT: 0.4 CM
BH CV XLRA MEAS - MID GSV THIGH  RIGHT: 0.3 CM
BH CV XLRA MEAS - MID LSV CALF DIST LEFT: 0.17 CM
BH CV XLRA MEAS - MID LSV CALF DIST RIGHT: 0.27 CM
BH CV XLRA MEAS - PROX GSV CALF DIST LEFT: 0.23 CM
BH CV XLRA MEAS - PROX GSV CALF DIST RIGHT: 0.26 CM
BH CV XLRA MEAS - PROX GSV THIGH  LEFT: 0.67 CM
BH CV XLRA MEAS - PROX GSV THIGH  RIGHT: 0.48 CM
BH CV XLRA MEAS - PROX LSV CALF DIST LEFT: 0.22 CM
BH CV XLRA MEAS - PROX LSV CALF DIST RIGHT: 0.2 CM
BH CV XLRA MEAS LEFT DIST CCA EDV: 9.35 CM/SEC
BH CV XLRA MEAS LEFT DIST CCA PSV: 90.6 CM/SEC
BH CV XLRA MEAS LEFT DIST ICA EDV: -17.2 CM/SEC
BH CV XLRA MEAS LEFT DIST ICA PSV: -50.6 CM/SEC
BH CV XLRA MEAS LEFT ICA/CCA RATIO: 0.81
BH CV XLRA MEAS LEFT MID ICA EDV: -23.3 CM/SEC
BH CV XLRA MEAS LEFT MID ICA PSV: -73.8 CM/SEC
BH CV XLRA MEAS LEFT PROX CCA EDV: 15.7 CM/SEC
BH CV XLRA MEAS LEFT PROX CCA PSV: 134 CM/SEC
BH CV XLRA MEAS LEFT PROX ECA EDV: 9.35 CM/SEC
BH CV XLRA MEAS LEFT PROX ECA PSV: 78.3 CM/SEC
BH CV XLRA MEAS LEFT PROX ICA EDV: -18.1 CM/SEC
BH CV XLRA MEAS LEFT PROX ICA PSV: -69.7 CM/SEC
BH CV XLRA MEAS LEFT PROX SCLA PSV: 177.2 CM/SEC
BH CV XLRA MEAS LEFT VERTEBRAL A EDV: -14.7 CM/SEC
BH CV XLRA MEAS LEFT VERTEBRAL A PSV: -40.3 CM/SEC
BH CV XLRA MEAS RIGHT DIST CCA EDV: 12.3 CM/SEC
BH CV XLRA MEAS RIGHT DIST CCA PSV: 71.6 CM/SEC
BH CV XLRA MEAS RIGHT DIST ICA EDV: -14.9 CM/SEC
BH CV XLRA MEAS RIGHT DIST ICA PSV: -55.3 CM/SEC
BH CV XLRA MEAS RIGHT ICA/CCA RATIO: -0.77
BH CV XLRA MEAS RIGHT MID ICA EDV: -12.4 CM/SEC
BH CV XLRA MEAS RIGHT MID ICA PSV: -57.8 CM/SEC
BH CV XLRA MEAS RIGHT PROX CCA EDV: 11.8 CM/SEC
BH CV XLRA MEAS RIGHT PROX CCA PSV: 76.2 CM/SEC
BH CV XLRA MEAS RIGHT PROX ECA EDV: 4.8 CM/SEC
BH CV XLRA MEAS RIGHT PROX ECA PSV: 75.1 CM/SEC
BH CV XLRA MEAS RIGHT PROX ICA EDV: 12.3 CM/SEC
BH CV XLRA MEAS RIGHT PROX ICA PSV: 39.9 CM/SEC
BH CV XLRA MEAS RIGHT PROX SCLA PSV: 112 CM/SEC
BH CV XLRA MEAS RIGHT VERTEBRAL A EDV: 5.35 CM/SEC
BH CV XLRA MEAS RIGHT VERTEBRAL A PSV: 36.3 CM/SEC
BILIRUB SERPL-MCNC: 0.3 MG/DL (ref 0–1.2)
BILIRUB SERPL-MCNC: 0.5 MG/DL (ref 0–1.2)
BILIRUB UR QL STRIP: NEGATIVE
BILIRUB UR QL STRIP: NEGATIVE
BLD GP AB SCN SERPL QL: NEGATIVE
BUN SERPL-MCNC: 11 MG/DL (ref 8–23)
BUN SERPL-MCNC: 14 MG/DL (ref 8–23)
BUN/CREAT SERPL: 12.6 (ref 7–25)
BUN/CREAT SERPL: 16.3 (ref 7–25)
CALCIUM SPEC-SCNC: 9.4 MG/DL (ref 8.6–10.5)
CALCIUM SPEC-SCNC: 9.5 MG/DL (ref 8.6–10.5)
CHLORIDE SERPL-SCNC: 103 MMOL/L (ref 98–107)
CHLORIDE SERPL-SCNC: 103 MMOL/L (ref 98–107)
CLARITY UR: CLEAR
CLARITY UR: CLEAR
CLOSE TME COLL+ADP + EPINEP PNL BLD: 95 % (ref 86–100)
CO2 SERPL-SCNC: 21.5 MMOL/L (ref 22–29)
CO2 SERPL-SCNC: 25.6 MMOL/L (ref 22–29)
COLOR UR: YELLOW
COLOR UR: YELLOW
CREAT SERPL-MCNC: 0.86 MG/DL (ref 0.57–1)
CREAT SERPL-MCNC: 0.87 MG/DL (ref 0.57–1)
DEPRECATED RDW RBC AUTO: 42.9 FL (ref 37–54)
DEPRECATED RDW RBC AUTO: 45 FL (ref 37–54)
EGFRCR SERPLBLD CKD-EPI 2021: 73.1 ML/MIN/1.73
EGFRCR SERPLBLD CKD-EPI 2021: 74.2 ML/MIN/1.73
EOSINOPHIL # BLD AUTO: 0.33 10*3/MM3 (ref 0–0.4)
EOSINOPHIL # BLD AUTO: 0.38 10*3/MM3 (ref 0–0.4)
EOSINOPHIL NFR BLD AUTO: 3.4 % (ref 0.3–6.2)
EOSINOPHIL NFR BLD AUTO: 3.8 % (ref 0.3–6.2)
ERYTHROCYTE [DISTWIDTH] IN BLOOD BY AUTOMATED COUNT: 13.4 % (ref 12.3–15.4)
ERYTHROCYTE [DISTWIDTH] IN BLOOD BY AUTOMATED COUNT: 14.1 % (ref 12.3–15.4)
GAS FLOW AIRWAY: 1.5 LPM
GLOBULIN UR ELPH-MCNC: 3.4 GM/DL
GLOBULIN UR ELPH-MCNC: 3.6 GM/DL
GLUCOSE BLDC GLUCOMTR-MCNC: 107 MG/DL (ref 70–130)
GLUCOSE BLDC GLUCOMTR-MCNC: 119 MG/DL (ref 70–130)
GLUCOSE BLDC GLUCOMTR-MCNC: 128 MG/DL (ref 70–130)
GLUCOSE SERPL-MCNC: 163 MG/DL (ref 65–99)
GLUCOSE SERPL-MCNC: 178 MG/DL (ref 65–99)
GLUCOSE UR STRIP-MCNC: NEGATIVE MG/DL
GLUCOSE UR STRIP-MCNC: NEGATIVE MG/DL
HCO3 BLDA-SCNC: 25.2 MMOL/L (ref 22–28)
HCT VFR BLD AUTO: 45.3 % (ref 34–46.6)
HCT VFR BLD AUTO: 45.5 % (ref 34–46.6)
HGB BLD-MCNC: 14.4 G/DL (ref 12–15.9)
HGB BLD-MCNC: 15.4 G/DL (ref 12–15.9)
HGB UR QL STRIP.AUTO: NEGATIVE
HGB UR QL STRIP.AUTO: NEGATIVE
HYALINE CASTS UR QL AUTO: ABNORMAL /LPF
IMM GRANULOCYTES # BLD AUTO: 0.02 10*3/MM3 (ref 0–0.05)
IMM GRANULOCYTES # BLD AUTO: 0.06 10*3/MM3 (ref 0–0.05)
IMM GRANULOCYTES NFR BLD AUTO: 0.2 % (ref 0–0.5)
IMM GRANULOCYTES NFR BLD AUTO: 0.5 % (ref 0–0.5)
INR PPP: 0.95 (ref 0.9–1.1)
KETONES UR QL STRIP: NEGATIVE
KETONES UR QL STRIP: NEGATIVE
LEUKOCYTE ESTERASE UR QL STRIP.AUTO: NEGATIVE
LEUKOCYTE ESTERASE UR QL STRIP.AUTO: NEGATIVE
LYMPHOCYTES # BLD AUTO: 1.93 10*3/MM3 (ref 0.7–3.1)
LYMPHOCYTES # BLD AUTO: 3 10*3/MM3 (ref 0.7–3.1)
LYMPHOCYTES NFR BLD AUTO: 22 % (ref 19.6–45.3)
LYMPHOCYTES NFR BLD AUTO: 26.7 % (ref 19.6–45.3)
MAGNESIUM SERPL-MCNC: 1.9 MG/DL (ref 1.6–2.4)
MAXIMAL PREDICTED HEART RATE: 153 BPM
MAXIMAL PREDICTED HEART RATE: 153 BPM
MCH RBC QN AUTO: 28.3 PG (ref 26.6–33)
MCH RBC QN AUTO: 28.6 PG (ref 26.6–33)
MCHC RBC AUTO-ENTMCNC: 31.6 G/DL (ref 31.5–35.7)
MCHC RBC AUTO-ENTMCNC: 34 G/DL (ref 31.5–35.7)
MCV RBC AUTO: 84 FL (ref 79–97)
MCV RBC AUTO: 89.6 FL (ref 79–97)
MODALITY: ABNORMAL
MONOCYTES # BLD AUTO: 0.62 10*3/MM3 (ref 0.1–0.9)
MONOCYTES # BLD AUTO: 0.89 10*3/MM3 (ref 0.1–0.9)
MONOCYTES NFR BLD AUTO: 7.1 % (ref 5–12)
MONOCYTES NFR BLD AUTO: 7.9 % (ref 5–12)
NEUTROPHILS NFR BLD AUTO: 5.82 10*3/MM3 (ref 1.7–7)
NEUTROPHILS NFR BLD AUTO: 6.81 10*3/MM3 (ref 1.7–7)
NEUTROPHILS NFR BLD AUTO: 60.7 % (ref 42.7–76)
NEUTROPHILS NFR BLD AUTO: 66.3 % (ref 42.7–76)
NITRITE UR QL STRIP: NEGATIVE
NITRITE UR QL STRIP: NEGATIVE
NRBC BLD AUTO-RTO: 0 /100 WBC (ref 0–0.2)
NT-PROBNP SERPL-MCNC: 344.4 PG/ML (ref 0–900)
PCO2 BLDA: 37.2 MM HG (ref 35–45)
PH BLDA: 7.44 PH UNITS (ref 7.35–7.45)
PH UR STRIP.AUTO: 7.5 [PH] (ref 4.5–8)
PH UR STRIP.AUTO: 8 [PH] (ref 5–8)
PLATELET # BLD AUTO: 321 10*3/MM3 (ref 140–450)
PLATELET # BLD AUTO: 356 10*3/MM3 (ref 140–450)
PMV BLD AUTO: 10.1 FL (ref 6–12)
PMV BLD AUTO: 10.1 FL (ref 6–12)
PO2 BLDA: 70.4 MM HG (ref 80–100)
POTASSIUM SERPL-SCNC: 4 MMOL/L (ref 3.5–5.2)
POTASSIUM SERPL-SCNC: 4.1 MMOL/L (ref 3.5–5.2)
PROT SERPL-MCNC: 7.4 G/DL (ref 6–8.5)
PROT SERPL-MCNC: 7.5 G/DL (ref 6–8.5)
PROT UR QL STRIP: ABNORMAL
PROT UR QL STRIP: NEGATIVE
PROTHROMBIN TIME: 12.6 SECONDS (ref 11.7–14.2)
QT INTERVAL: 385 MS
QT INTERVAL: 412 MS
RBC # BLD AUTO: 5.08 10*6/MM3 (ref 3.77–5.28)
RBC # BLD AUTO: 5.39 10*6/MM3 (ref 3.77–5.28)
RBC # UR STRIP: ABNORMAL /HPF
REF LAB TEST METHOD: ABNORMAL
RH BLD: NEGATIVE
SAO2 % BLDCOA: 94.5 % (ref 92–99)
SARS-COV-2 RNA PNL SPEC NAA+PROBE: NOT DETECTED
SODIUM SERPL-SCNC: 137 MMOL/L (ref 136–145)
SODIUM SERPL-SCNC: 139 MMOL/L (ref 136–145)
SP GR UR STRIP: 1.02 (ref 1–1.03)
SP GR UR STRIP: >=1.03 (ref 1–1.03)
SQUAMOUS #/AREA URNS HPF: ABNORMAL /HPF
STRESS TARGET HR: 130 BPM
STRESS TARGET HR: 130 BPM
T&S EXPIRATION DATE: NORMAL
TOTAL RATE: 18 BREATHS/MINUTE
TROPONIN T SERPL-MCNC: <0.01 NG/ML (ref 0–0.03)
UROBILINOGEN UR QL STRIP: ABNORMAL
UROBILINOGEN UR QL STRIP: NORMAL
WBC # UR STRIP: ABNORMAL /HPF
WBC NRBC COR # BLD: 11.23 10*3/MM3 (ref 3.4–10.8)
WBC NRBC COR # BLD: 8.77 10*3/MM3 (ref 3.4–10.8)

## 2022-04-28 PROCEDURE — 0 IOPAMIDOL PER 1 ML: Performed by: INTERNAL MEDICINE

## 2022-04-28 PROCEDURE — 99284 EMERGENCY DEPT VISIT MOD MDM: CPT

## 2022-04-28 PROCEDURE — 85576 BLOOD PLATELET AGGREGATION: CPT

## 2022-04-28 PROCEDURE — 86850 RBC ANTIBODY SCREEN: CPT

## 2022-04-28 PROCEDURE — 86901 BLOOD TYPING SEROLOGIC RH(D): CPT

## 2022-04-28 PROCEDURE — 71045 X-RAY EXAM CHEST 1 VIEW: CPT

## 2022-04-28 PROCEDURE — 93458 L HRT ARTERY/VENTRICLE ANGIO: CPT | Performed by: INTERNAL MEDICINE

## 2022-04-28 PROCEDURE — 99152 MOD SED SAME PHYS/QHP 5/>YRS: CPT | Performed by: INTERNAL MEDICINE

## 2022-04-28 PROCEDURE — 36600 WITHDRAWAL OF ARTERIAL BLOOD: CPT

## 2022-04-28 PROCEDURE — 80053 COMPREHEN METABOLIC PANEL: CPT | Performed by: EMERGENCY MEDICINE

## 2022-04-28 PROCEDURE — B2151ZZ FLUOROSCOPY OF LEFT HEART USING LOW OSMOLAR CONTRAST: ICD-10-PCS | Performed by: INTERNAL MEDICINE

## 2022-04-28 PROCEDURE — B2111ZZ FLUOROSCOPY OF MULTIPLE CORONARY ARTERIES USING LOW OSMOLAR CONTRAST: ICD-10-PCS | Performed by: INTERNAL MEDICINE

## 2022-04-28 PROCEDURE — 87635 SARS-COV-2 COVID-19 AMP PRB: CPT | Performed by: EMERGENCY MEDICINE

## 2022-04-28 PROCEDURE — C1894 INTRO/SHEATH, NON-LASER: HCPCS | Performed by: INTERNAL MEDICINE

## 2022-04-28 PROCEDURE — 93880 EXTRACRANIAL BILAT STUDY: CPT

## 2022-04-28 PROCEDURE — 93010 ELECTROCARDIOGRAM REPORT: CPT | Performed by: INTERNAL MEDICINE

## 2022-04-28 PROCEDURE — 87086 URINE CULTURE/COLONY COUNT: CPT

## 2022-04-28 PROCEDURE — C1769 GUIDE WIRE: HCPCS | Performed by: INTERNAL MEDICINE

## 2022-04-28 PROCEDURE — 85730 THROMBOPLASTIN TIME PARTIAL: CPT

## 2022-04-28 PROCEDURE — 99222 1ST HOSP IP/OBS MODERATE 55: CPT | Performed by: INTERNAL MEDICINE

## 2022-04-28 PROCEDURE — 86923 COMPATIBILITY TEST ELECTRIC: CPT

## 2022-04-28 PROCEDURE — 71046 X-RAY EXAM CHEST 2 VIEWS: CPT

## 2022-04-28 PROCEDURE — 83880 ASSAY OF NATRIURETIC PEPTIDE: CPT | Performed by: EMERGENCY MEDICINE

## 2022-04-28 PROCEDURE — 86900 BLOOD TYPING SEROLOGIC ABO: CPT

## 2022-04-28 PROCEDURE — 85025 COMPLETE CBC W/AUTO DIFF WBC: CPT | Performed by: EMERGENCY MEDICINE

## 2022-04-28 PROCEDURE — 25010000002 MIDAZOLAM PER 1 MG: Performed by: INTERNAL MEDICINE

## 2022-04-28 PROCEDURE — 82803 BLOOD GASES ANY COMBINATION: CPT

## 2022-04-28 PROCEDURE — 25010000002 FENTANYL CITRATE (PF) 50 MCG/ML SOLUTION: Performed by: INTERNAL MEDICINE

## 2022-04-28 PROCEDURE — 63710000001 DIPHENHYDRAMINE PER 50 MG

## 2022-04-28 PROCEDURE — 25010000002 HEPARIN (PORCINE) PER 1000 UNITS: Performed by: INTERNAL MEDICINE

## 2022-04-28 PROCEDURE — 84484 ASSAY OF TROPONIN QUANT: CPT | Performed by: EMERGENCY MEDICINE

## 2022-04-28 PROCEDURE — 93005 ELECTROCARDIOGRAM TRACING: CPT

## 2022-04-28 PROCEDURE — 85610 PROTHROMBIN TIME: CPT

## 2022-04-28 PROCEDURE — 93005 ELECTROCARDIOGRAM TRACING: CPT | Performed by: EMERGENCY MEDICINE

## 2022-04-28 PROCEDURE — 81001 URINALYSIS AUTO W/SCOPE: CPT | Performed by: EMERGENCY MEDICINE

## 2022-04-28 PROCEDURE — 83735 ASSAY OF MAGNESIUM: CPT

## 2022-04-28 PROCEDURE — 81003 URINALYSIS AUTO W/O SCOPE: CPT

## 2022-04-28 PROCEDURE — 99284 EMERGENCY DEPT VISIT MOD MDM: CPT | Performed by: EMERGENCY MEDICINE

## 2022-04-28 PROCEDURE — C9803 HOPD COVID-19 SPEC COLLECT: HCPCS | Performed by: EMERGENCY MEDICINE

## 2022-04-28 PROCEDURE — 82962 GLUCOSE BLOOD TEST: CPT

## 2022-04-28 PROCEDURE — 85025 COMPLETE CBC W/AUTO DIFF WBC: CPT

## 2022-04-28 PROCEDURE — 4A023N7 MEASUREMENT OF CARDIAC SAMPLING AND PRESSURE, LEFT HEART, PERCUTANEOUS APPROACH: ICD-10-PCS | Performed by: INTERNAL MEDICINE

## 2022-04-28 PROCEDURE — 80053 COMPREHEN METABOLIC PANEL: CPT

## 2022-04-28 PROCEDURE — B24BZZ4 ULTRASONOGRAPHY OF HEART WITH AORTA, TRANSESOPHAGEAL: ICD-10-PCS | Performed by: INTERNAL MEDICINE

## 2022-04-28 PROCEDURE — 93970 EXTREMITY STUDY: CPT

## 2022-04-28 RX ORDER — CEFAZOLIN SODIUM 2 G/100ML
2 INJECTION, SOLUTION INTRAVENOUS
Status: COMPLETED | OUTPATIENT
Start: 2022-04-29 | End: 2022-04-29

## 2022-04-28 RX ORDER — ACETAMINOPHEN 325 MG/1
650 TABLET ORAL EVERY 4 HOURS PRN
Status: DISCONTINUED | OUTPATIENT
Start: 2022-04-28 | End: 2022-04-29

## 2022-04-28 RX ORDER — SODIUM CHLORIDE 9 MG/ML
75 INJECTION, SOLUTION INTRAVENOUS CONTINUOUS
Status: DISCONTINUED | OUTPATIENT
Start: 2022-04-28 | End: 2022-04-29

## 2022-04-28 RX ORDER — SPIRONOLACTONE 25 MG/1
25 TABLET ORAL DAILY
Status: DISCONTINUED | OUTPATIENT
Start: 2022-04-28 | End: 2022-04-29

## 2022-04-28 RX ORDER — MIDAZOLAM HYDROCHLORIDE 1 MG/ML
INJECTION INTRAMUSCULAR; INTRAVENOUS AS NEEDED
Status: DISCONTINUED | OUTPATIENT
Start: 2022-04-28 | End: 2022-04-28 | Stop reason: HOSPADM

## 2022-04-28 RX ORDER — FENTANYL CITRATE 50 UG/ML
INJECTION, SOLUTION INTRAMUSCULAR; INTRAVENOUS AS NEEDED
Status: DISCONTINUED | OUTPATIENT
Start: 2022-04-28 | End: 2022-04-28 | Stop reason: HOSPADM

## 2022-04-28 RX ORDER — DEXTROSE MONOHYDRATE 25 G/50ML
10-50 INJECTION, SOLUTION INTRAVENOUS
Status: DISCONTINUED | OUTPATIENT
Start: 2022-04-28 | End: 2022-04-29 | Stop reason: HOSPADM

## 2022-04-28 RX ORDER — DIPHENHYDRAMINE HCL 25 MG
25 CAPSULE ORAL NIGHTLY PRN
Status: DISCONTINUED | OUTPATIENT
Start: 2022-04-28 | End: 2022-04-29 | Stop reason: HOSPADM

## 2022-04-28 RX ORDER — ALPRAZOLAM 0.5 MG/1
0.25 TABLET ORAL EVERY 8 HOURS PRN
Status: DISCONTINUED | OUTPATIENT
Start: 2022-04-28 | End: 2022-04-29 | Stop reason: HOSPADM

## 2022-04-28 RX ORDER — SODIUM CHLORIDE 9 MG/ML
30 INJECTION, SOLUTION INTRAVENOUS CONTINUOUS PRN
Status: DISCONTINUED | OUTPATIENT
Start: 2022-04-28 | End: 2022-04-29

## 2022-04-28 RX ORDER — PHENOL 1.4 %
600 AEROSOL, SPRAY (ML) MUCOUS MEMBRANE DAILY
COMMUNITY

## 2022-04-28 RX ORDER — NICOTINE POLACRILEX 4 MG
15 LOZENGE BUCCAL
Status: DISCONTINUED | OUTPATIENT
Start: 2022-04-28 | End: 2022-04-29 | Stop reason: HOSPADM

## 2022-04-28 RX ORDER — MELATONIN
2000 DAILY
COMMUNITY

## 2022-04-28 RX ORDER — SODIUM CHLORIDE 0.9 % (FLUSH) 0.9 %
30 SYRINGE (ML) INJECTION ONCE AS NEEDED
Status: DISCONTINUED | OUTPATIENT
Start: 2022-04-28 | End: 2022-04-29 | Stop reason: HOSPADM

## 2022-04-28 RX ORDER — ALBUTEROL SULFATE 2.5 MG/3ML
2.5 SOLUTION RESPIRATORY (INHALATION) ONCE
Status: DISCONTINUED | OUTPATIENT
Start: 2022-04-29 | End: 2022-04-29

## 2022-04-28 RX ORDER — ACETAMINOPHEN 325 MG/1
650 TABLET ORAL EVERY 4 HOURS PRN
Status: DISCONTINUED | OUTPATIENT
Start: 2022-04-28 | End: 2022-04-29 | Stop reason: HOSPADM

## 2022-04-28 RX ORDER — SODIUM CHLORIDE 0.9 % (FLUSH) 0.9 %
10 SYRINGE (ML) INJECTION AS NEEDED
Status: DISCONTINUED | OUTPATIENT
Start: 2022-04-28 | End: 2022-04-28 | Stop reason: HOSPADM

## 2022-04-28 RX ORDER — SODIUM CHLORIDE 0.9 % (FLUSH) 0.9 %
10 SYRINGE (ML) INJECTION AS NEEDED
Status: DISCONTINUED | OUTPATIENT
Start: 2022-04-28 | End: 2022-04-29 | Stop reason: HOSPADM

## 2022-04-28 RX ORDER — CHLORHEXIDINE GLUCONATE 500 MG/1
1 CLOTH TOPICAL EVERY 12 HOURS PRN
Status: DISCONTINUED | OUTPATIENT
Start: 2022-04-28 | End: 2022-04-29 | Stop reason: HOSPADM

## 2022-04-28 RX ORDER — BIOTIN 1 MG
1000 TABLET ORAL DAILY
COMMUNITY

## 2022-04-28 RX ORDER — SODIUM CHLORIDE 9 MG/ML
1 INJECTION, SOLUTION INTRAVENOUS CONTINUOUS
Status: ACTIVE | OUTPATIENT
Start: 2022-04-28 | End: 2022-04-28

## 2022-04-28 RX ORDER — METOPROLOL TARTRATE 50 MG/1
100 TABLET, FILM COATED ORAL DAILY
Status: DISCONTINUED | OUTPATIENT
Start: 2022-04-28 | End: 2022-04-29

## 2022-04-28 RX ORDER — CHLORHEXIDINE GLUCONATE 0.12 MG/ML
15 RINSE ORAL ONCE
Status: COMPLETED | OUTPATIENT
Start: 2022-04-28 | End: 2022-04-28

## 2022-04-28 RX ORDER — LIDOCAINE HYDROCHLORIDE 20 MG/ML
INJECTION, SOLUTION INFILTRATION; PERINEURAL AS NEEDED
Status: DISCONTINUED | OUTPATIENT
Start: 2022-04-28 | End: 2022-04-28 | Stop reason: HOSPADM

## 2022-04-28 RX ORDER — SODIUM CHLORIDE 0.9 % (FLUSH) 0.9 %
10 SYRINGE (ML) INJECTION EVERY 12 HOURS SCHEDULED
Status: DISCONTINUED | OUTPATIENT
Start: 2022-04-28 | End: 2022-04-29 | Stop reason: HOSPADM

## 2022-04-28 RX ADMIN — Medication 10 ML: at 20:52

## 2022-04-28 RX ADMIN — CHLORHEXIDINE GLUCONATE 15 ML: 1.2 RINSE ORAL at 20:52

## 2022-04-28 RX ADMIN — SODIUM CHLORIDE 75 ML/HR: 9 INJECTION, SOLUTION INTRAVENOUS at 13:47

## 2022-04-28 RX ADMIN — MUPIROCIN 1 APPLICATION: 20 OINTMENT TOPICAL at 20:52

## 2022-04-28 RX ADMIN — DIPHENHYDRAMINE HYDROCHLORIDE 25 MG: 25 CAPSULE ORAL at 20:56

## 2022-04-28 RX ADMIN — ALPRAZOLAM 0.25 MG: 0.5 TABLET ORAL at 20:56

## 2022-04-29 ENCOUNTER — ANESTHESIA EVENT (OUTPATIENT)
Dept: PERIOP | Facility: HOSPITAL | Age: 67
End: 2022-04-29

## 2022-04-29 ENCOUNTER — APPOINTMENT (OUTPATIENT)
Dept: RESPIRATORY THERAPY | Facility: HOSPITAL | Age: 67
End: 2022-04-29

## 2022-04-29 ENCOUNTER — ANCILLARY PROCEDURE (OUTPATIENT)
Dept: PERIOP | Facility: HOSPITAL | Age: 67
End: 2022-04-29

## 2022-04-29 ENCOUNTER — APPOINTMENT (OUTPATIENT)
Dept: GENERAL RADIOLOGY | Facility: HOSPITAL | Age: 67
End: 2022-04-29

## 2022-04-29 ENCOUNTER — ANESTHESIA (OUTPATIENT)
Dept: PERIOP | Facility: HOSPITAL | Age: 67
End: 2022-04-29

## 2022-04-29 LAB
ACT BLD: 142 SECONDS (ref 82–152)
ACT BLD: 147 SECONDS (ref 82–152)
ACT BLD: 440 SECONDS (ref 82–152)
ACT BLD: 487 SECONDS (ref 82–152)
ACT BLD: 499 SECONDS (ref 82–152)
ALBUMIN SERPL-MCNC: 4.5 G/DL (ref 3.5–5.2)
ALBUMIN SERPL-MCNC: 4.6 G/DL (ref 3.5–5.2)
ANION GAP SERPL CALCULATED.3IONS-SCNC: 11 MMOL/L (ref 5–15)
ANION GAP SERPL CALCULATED.3IONS-SCNC: 11.7 MMOL/L (ref 5–15)
ANION GAP SERPL CALCULATED.3IONS-SCNC: 14 MMOL/L (ref 5–15)
APTT PPP: 31.4 SECONDS (ref 22.7–35.4)
ARTERIAL PATENCY WRIST A: ABNORMAL
ARTERIAL PATENCY WRIST A: ABNORMAL
ATMOSPHERIC PRESS: 750.2 MMHG
ATMOSPHERIC PRESS: 751.5 MMHG
BACTERIA SPEC AEROBE CULT: NO GROWTH
BASE EXCESS BLDA CALC-SCNC: -1 MMOL/L (ref -5–5)
BASE EXCESS BLDA CALC-SCNC: -1.6 MMOL/L (ref 0–2)
BASE EXCESS BLDA CALC-SCNC: -2.6 MMOL/L (ref 0–2)
BASE EXCESS BLDA CALC-SCNC: -3 MMOL/L (ref -5–5)
BASE EXCESS BLDA CALC-SCNC: 0 MMOL/L (ref -5–5)
BASE EXCESS BLDA CALC-SCNC: 2 MMOL/L (ref -5–5)
BASE EXCESS BLDA CALC-SCNC: 2 MMOL/L (ref -5–5)
BASOPHILS # BLD AUTO: 0.05 10*3/MM3 (ref 0–0.2)
BASOPHILS NFR BLD AUTO: 0.3 % (ref 0–1.5)
BDY SITE: ABNORMAL
BDY SITE: ABNORMAL
BUN SERPL-MCNC: 10 MG/DL (ref 8–23)
BUN SERPL-MCNC: 10 MG/DL (ref 8–23)
BUN SERPL-MCNC: 11 MG/DL (ref 8–23)
BUN/CREAT SERPL: 10.8 (ref 7–25)
BUN/CREAT SERPL: 13 (ref 7–25)
BUN/CREAT SERPL: 13.9 (ref 7–25)
CA-I BLD-MCNC: 5.3 MG/DL (ref 4.6–5.4)
CA-I BLDA-SCNC: ABNORMAL MMOL/L
CA-I SERPL ISE-MCNC: 1.32 MMOL/L (ref 1.15–1.35)
CALCIUM SPEC-SCNC: 8.5 MG/DL (ref 8.6–10.5)
CALCIUM SPEC-SCNC: 9.4 MG/DL (ref 8.6–10.5)
CALCIUM SPEC-SCNC: 9.5 MG/DL (ref 8.6–10.5)
CHLORIDE SERPL-SCNC: 102 MMOL/L (ref 98–107)
CHLORIDE SERPL-SCNC: 105 MMOL/L (ref 98–107)
CHLORIDE SERPL-SCNC: 105 MMOL/L (ref 98–107)
CHOLEST SERPL-MCNC: 194 MG/DL (ref 0–200)
CO2 BLDA-SCNC: 24 MMOL/L (ref 24–29)
CO2 BLDA-SCNC: 26 MMOL/L (ref 24–29)
CO2 BLDA-SCNC: 28 MMOL/L (ref 24–29)
CO2 BLDA-SCNC: 29 MMOL/L (ref 24–29)
CO2 BLDA-SCNC: 29 MMOL/L (ref 24–29)
CO2 SERPL-SCNC: 21 MMOL/L (ref 22–29)
CO2 SERPL-SCNC: 22 MMOL/L (ref 22–29)
CO2 SERPL-SCNC: 24.3 MMOL/L (ref 22–29)
CREAT SERPL-MCNC: 0.77 MG/DL (ref 0.57–1)
CREAT SERPL-MCNC: 0.79 MG/DL (ref 0.57–1)
CREAT SERPL-MCNC: 0.93 MG/DL (ref 0.57–1)
DEPRECATED RDW RBC AUTO: 41.8 FL (ref 37–54)
DEPRECATED RDW RBC AUTO: 44.3 FL (ref 37–54)
DEPRECATED RDW RBC AUTO: 44.7 FL (ref 37–54)
EGFRCR SERPLBLD CKD-EPI 2021: 67.5 ML/MIN/1.73
EGFRCR SERPLBLD CKD-EPI 2021: 82.1 ML/MIN/1.73
EGFRCR SERPLBLD CKD-EPI 2021: 84.7 ML/MIN/1.73
EOSINOPHIL # BLD AUTO: 0.15 10*3/MM3 (ref 0–0.4)
EOSINOPHIL NFR BLD AUTO: 1 % (ref 0.3–6.2)
ERYTHROCYTE [DISTWIDTH] IN BLOOD BY AUTOMATED COUNT: 13.7 % (ref 12.3–15.4)
ERYTHROCYTE [DISTWIDTH] IN BLOOD BY AUTOMATED COUNT: 14 % (ref 12.3–15.4)
ERYTHROCYTE [DISTWIDTH] IN BLOOD BY AUTOMATED COUNT: 14 % (ref 12.3–15.4)
FIBRINOGEN PPP-MCNC: 381 MG/DL (ref 219–464)
GLUCOSE BLDC GLUCOMTR-MCNC: 125 MG/DL (ref 70–130)
GLUCOSE BLDC GLUCOMTR-MCNC: 130 MG/DL (ref 70–130)
GLUCOSE BLDC GLUCOMTR-MCNC: 142 MG/DL (ref 70–130)
GLUCOSE BLDC GLUCOMTR-MCNC: 143 MG/DL (ref 70–130)
GLUCOSE BLDC GLUCOMTR-MCNC: 143 MG/DL (ref 70–130)
GLUCOSE BLDC GLUCOMTR-MCNC: 145 MG/DL (ref 70–130)
GLUCOSE BLDC GLUCOMTR-MCNC: 145 MG/DL (ref 70–130)
GLUCOSE BLDC GLUCOMTR-MCNC: 147 MG/DL (ref 70–130)
GLUCOSE BLDC GLUCOMTR-MCNC: 148 MG/DL (ref 70–130)
GLUCOSE BLDC GLUCOMTR-MCNC: 154 MG/DL (ref 70–130)
GLUCOSE BLDC GLUCOMTR-MCNC: 155 MG/DL (ref 70–130)
GLUCOSE BLDC GLUCOMTR-MCNC: 158 MG/DL (ref 70–130)
GLUCOSE BLDC GLUCOMTR-MCNC: 160 MG/DL (ref 70–130)
GLUCOSE BLDC GLUCOMTR-MCNC: 160 MG/DL (ref 70–130)
GLUCOSE BLDC GLUCOMTR-MCNC: 163 MG/DL (ref 70–130)
GLUCOSE BLDC GLUCOMTR-MCNC: 182 MG/DL (ref 70–130)
GLUCOSE SERPL-MCNC: 126 MG/DL (ref 65–99)
GLUCOSE SERPL-MCNC: 142 MG/DL (ref 65–99)
GLUCOSE SERPL-MCNC: 143 MG/DL (ref 65–99)
HBA1C MFR BLD: 7.1 % (ref 4.8–5.6)
HCO3 BLDA-SCNC: 22.8 MMOL/L (ref 22–26)
HCO3 BLDA-SCNC: 22.8 MMOL/L (ref 22–28)
HCO3 BLDA-SCNC: 23.5 MMOL/L (ref 22–28)
HCO3 BLDA-SCNC: 24.6 MMOL/L (ref 22–26)
HCO3 BLDA-SCNC: 26.1 MMOL/L (ref 22–26)
HCO3 BLDA-SCNC: 27.5 MMOL/L (ref 22–26)
HCO3 BLDA-SCNC: 27.6 MMOL/L (ref 22–26)
HCT VFR BLD AUTO: 35.9 % (ref 34–46.6)
HCT VFR BLD AUTO: 36.4 % (ref 34–46.6)
HCT VFR BLD AUTO: 40.9 % (ref 34–46.6)
HCT VFR BLDA CALC: 30 % (ref 38–51)
HCT VFR BLDA CALC: 32 % (ref 38–51)
HCT VFR BLDA CALC: 33 % (ref 38–51)
HCT VFR BLDA CALC: 34 % (ref 38–51)
HCT VFR BLDA CALC: 38 % (ref 38–51)
HDLC SERPL-MCNC: 33 MG/DL (ref 40–60)
HGB BLD-MCNC: 12.3 G/DL (ref 12–15.9)
HGB BLD-MCNC: 12.4 G/DL (ref 12–15.9)
HGB BLD-MCNC: 13.7 G/DL (ref 12–15.9)
HGB BLDA-MCNC: 10.2 G/DL (ref 12–17)
HGB BLDA-MCNC: 10.9 G/DL (ref 12–17)
HGB BLDA-MCNC: 11.2 G/DL (ref 12–17)
HGB BLDA-MCNC: 11.6 G/DL (ref 12–17)
HGB BLDA-MCNC: 12.9 G/DL (ref 12–17)
IMM GRANULOCYTES # BLD AUTO: 0.12 10*3/MM3 (ref 0–0.05)
IMM GRANULOCYTES NFR BLD AUTO: 0.8 % (ref 0–0.5)
INHALED O2 CONCENTRATION: 100 %
INHALED O2 CONCENTRATION: 40 %
INR PPP: 1.23 (ref 0.9–1.1)
LDLC SERPL CALC-MCNC: 117 MG/DL (ref 0–100)
LDLC/HDLC SERPL: 3.35 {RATIO}
LYMPHOCYTES # BLD AUTO: 1.47 10*3/MM3 (ref 0.7–3.1)
LYMPHOCYTES NFR BLD AUTO: 10.2 % (ref 19.6–45.3)
MAGNESIUM SERPL-MCNC: 2.5 MG/DL (ref 1.6–2.4)
MAGNESIUM SERPL-MCNC: 2.6 MG/DL (ref 1.6–2.4)
MCH RBC QN AUTO: 29.3 PG (ref 26.6–33)
MCH RBC QN AUTO: 29.3 PG (ref 26.6–33)
MCH RBC QN AUTO: 29.7 PG (ref 26.6–33)
MCHC RBC AUTO-ENTMCNC: 33.5 G/DL (ref 31.5–35.7)
MCHC RBC AUTO-ENTMCNC: 33.8 G/DL (ref 31.5–35.7)
MCHC RBC AUTO-ENTMCNC: 34.5 G/DL (ref 31.5–35.7)
MCV RBC AUTO: 84.9 FL (ref 79–97)
MCV RBC AUTO: 87.4 FL (ref 79–97)
MCV RBC AUTO: 87.9 FL (ref 79–97)
MODALITY: ABNORMAL
MODALITY: ABNORMAL
MONOCYTES # BLD AUTO: 1.16 10*3/MM3 (ref 0.1–0.9)
MONOCYTES NFR BLD AUTO: 8.1 % (ref 5–12)
NEUTROPHILS NFR BLD AUTO: 11.41 10*3/MM3 (ref 1.7–7)
NEUTROPHILS NFR BLD AUTO: 79.6 % (ref 42.7–76)
NRBC BLD AUTO-RTO: 0 /100 WBC (ref 0–0.2)
O2 A-A PPRESDIFF RESPIRATORY: 0.3 MMHG
O2 A-A PPRESDIFF RESPIRATORY: 0.3 MMHG
PCO2 BLDA: 40 MM HG (ref 35–45)
PCO2 BLDA: 40.9 MM HG (ref 35–45)
PCO2 BLDA: 43.2 MM HG (ref 35–45)
PCO2 BLDA: 44.2 MM HG (ref 35–45)
PCO2 BLDA: 49.7 MM HG (ref 35–45)
PCO2 BLDA: 51 MM HG (ref 35–45)
PCO2 BLDA: 51 MM HG (ref 35–45)
PEEP RESPIRATORY: 10 CM[H2O]
PEEP RESPIRATORY: 10 CM[H2O]
PH BLDA: 7.34 PH UNITS (ref 7.35–7.6)
PH BLDA: 7.36 PH UNITS (ref 7.35–7.45)
PH BLDA: 7.36 PH UNITS (ref 7.35–7.6)
PH BLDA: 7.37 PH UNITS (ref 7.35–7.6)
PH BLDA: 7.38 PH UNITS (ref 7.35–7.45)
PH BLDA: 7.38 PH UNITS (ref 7.35–7.6)
PH BLDA: 7.38 PH UNITS (ref 7.35–7.6)
PHOSPHATE SERPL-MCNC: 2 MG/DL (ref 2.5–4.5)
PHOSPHATE SERPL-MCNC: 3.6 MG/DL (ref 2.5–4.5)
PLATELET # BLD AUTO: 195 10*3/MM3 (ref 140–450)
PLATELET # BLD AUTO: 202 10*3/MM3 (ref 140–450)
PLATELET # BLD AUTO: 227 10*3/MM3 (ref 140–450)
PMV BLD AUTO: 10.2 FL (ref 6–12)
PMV BLD AUTO: 10.2 FL (ref 6–12)
PMV BLD AUTO: 10.4 FL (ref 6–12)
PO2 BLDA: 177 MMHG (ref 80–105)
PO2 BLDA: 222.3 MM HG (ref 80–100)
PO2 BLDA: 250 MMHG (ref 80–105)
PO2 BLDA: 378 MMHG (ref 80–105)
PO2 BLDA: 404 MMHG (ref 80–105)
PO2 BLDA: 43 MMHG (ref 80–105)
PO2 BLDA: 78.6 MM HG (ref 80–100)
POTASSIUM BLDA-SCNC: 3.5 MMOL/L (ref 3.5–4.9)
POTASSIUM BLDA-SCNC: 3.8 MMOL/L (ref 3.5–4.9)
POTASSIUM BLDA-SCNC: 3.9 MMOL/L (ref 3.5–4.9)
POTASSIUM BLDA-SCNC: 4.3 MMOL/L (ref 3.5–4.9)
POTASSIUM BLDA-SCNC: 4.4 MMOL/L (ref 3.5–4.9)
POTASSIUM SERPL-SCNC: 3.8 MMOL/L (ref 3.5–5.2)
POTASSIUM SERPL-SCNC: 3.9 MMOL/L (ref 3.5–5.2)
POTASSIUM SERPL-SCNC: 4 MMOL/L (ref 3.5–5.2)
PROTHROMBIN TIME: 15.4 SECONDS (ref 11.7–14.2)
PSV: 8 CMH2O
QT INTERVAL: 482 MS
RBC # BLD AUTO: 4.14 10*6/MM3 (ref 3.77–5.28)
RBC # BLD AUTO: 4.23 10*6/MM3 (ref 3.77–5.28)
RBC # BLD AUTO: 4.68 10*6/MM3 (ref 3.77–5.28)
SAO2 % BLDA: 100 % (ref 95–98)
SAO2 % BLDA: 74 % (ref 95–98)
SAO2 % BLDA: 99 % (ref 95–98)
SAO2 % BLDCOA: 94.9 % (ref 92–99)
SAO2 % BLDCOA: 99.8 % (ref 92–99)
SET MECH RESP RATE: 15
SET MECH RESP RATE: 22
SODIUM SERPL-SCNC: 138 MMOL/L (ref 136–145)
SODIUM SERPL-SCNC: 138 MMOL/L (ref 136–145)
SODIUM SERPL-SCNC: 140 MMOL/L (ref 136–145)
TOTAL RATE: 18 BREATHS/MINUTE
TRIGL SERPL-MCNC: 253 MG/DL (ref 0–150)
VENTILATOR MODE: ABNORMAL
VENTILATOR MODE: ABNORMAL
VLDLC SERPL-MCNC: 44 MG/DL (ref 5–40)
VT ON VENT VENT: 450 ML
VT ON VENT VENT: 600 ML
WBC NRBC COR # BLD: 12.15 10*3/MM3 (ref 3.4–10.8)
WBC NRBC COR # BLD: 12.72 10*3/MM3 (ref 3.4–10.8)
WBC NRBC COR # BLD: 14.36 10*3/MM3 (ref 3.4–10.8)

## 2022-04-29 PROCEDURE — 33508 ENDOSCOPIC VEIN HARVEST: CPT | Performed by: PHYSICIAN ASSISTANT

## 2022-04-29 PROCEDURE — 63710000001 INSULIN REGULAR HUMAN PER 5 UNITS: Performed by: STUDENT IN AN ORGANIZED HEALTH CARE EDUCATION/TRAINING PROGRAM

## 2022-04-29 PROCEDURE — 82803 BLOOD GASES ANY COMBINATION: CPT

## 2022-04-29 PROCEDURE — 25010000002 HEPARIN (PORCINE) PER 1000 UNITS

## 2022-04-29 PROCEDURE — 94010 BREATHING CAPACITY TEST: CPT

## 2022-04-29 PROCEDURE — 71045 X-RAY EXAM CHEST 1 VIEW: CPT

## 2022-04-29 PROCEDURE — 25010000002 MIDAZOLAM PER 1 MG: Performed by: STUDENT IN AN ORGANIZED HEALTH CARE EDUCATION/TRAINING PROGRAM

## 2022-04-29 PROCEDURE — 93010 ELECTROCARDIOGRAM REPORT: CPT | Performed by: INTERNAL MEDICINE

## 2022-04-29 PROCEDURE — 93318 ECHO TRANSESOPHAGEAL INTRAOP: CPT | Performed by: STUDENT IN AN ORGANIZED HEALTH CARE EDUCATION/TRAINING PROGRAM

## 2022-04-29 PROCEDURE — C1751 CATH, INF, PER/CENT/MIDLINE: HCPCS | Performed by: STUDENT IN AN ORGANIZED HEALTH CARE EDUCATION/TRAINING PROGRAM

## 2022-04-29 PROCEDURE — 25010000002 FENTANYL CITRATE (PF) 50 MCG/ML SOLUTION: Performed by: STUDENT IN AN ORGANIZED HEALTH CARE EDUCATION/TRAINING PROGRAM

## 2022-04-29 PROCEDURE — 25010000002 PHENYLEPHRINE 10 MG/ML SOLUTION 5 ML VIAL: Performed by: STUDENT IN AN ORGANIZED HEALTH CARE EDUCATION/TRAINING PROGRAM

## 2022-04-29 PROCEDURE — P9047 ALBUMIN (HUMAN), 25%, 50ML: HCPCS

## 2022-04-29 PROCEDURE — 06BP4ZZ EXCISION OF RIGHT SAPHENOUS VEIN, PERCUTANEOUS ENDOSCOPIC APPROACH: ICD-10-PCS

## 2022-04-29 PROCEDURE — 25010000002 CEFAZOLIN IN DEXTROSE 2-4 GM/100ML-% SOLUTION: Performed by: NURSE PRACTITIONER

## 2022-04-29 PROCEDURE — 25010000002 MAGNESIUM SULFATE PER 500 MG OF MAGNESIUM: Performed by: STUDENT IN AN ORGANIZED HEALTH CARE EDUCATION/TRAINING PROGRAM

## 2022-04-29 PROCEDURE — 25010000002 PROTAMINE SULFATE PER 10 MG: Performed by: STUDENT IN AN ORGANIZED HEALTH CARE EDUCATION/TRAINING PROGRAM

## 2022-04-29 PROCEDURE — 33533 CABG ARTERIAL SINGLE: CPT | Performed by: PHYSICIAN ASSISTANT

## 2022-04-29 PROCEDURE — 85347 COAGULATION TIME ACTIVATED: CPT

## 2022-04-29 PROCEDURE — 25010000002 MORPHINE PER 10 MG: Performed by: NURSE PRACTITIONER

## 2022-04-29 PROCEDURE — 5A1221Z PERFORMANCE OF CARDIAC OUTPUT, CONTINUOUS: ICD-10-PCS

## 2022-04-29 PROCEDURE — 33508 ENDOSCOPIC VEIN HARVEST: CPT

## 2022-04-29 PROCEDURE — 80061 LIPID PANEL: CPT | Performed by: INTERNAL MEDICINE

## 2022-04-29 PROCEDURE — 83735 ASSAY OF MAGNESIUM: CPT | Performed by: NURSE PRACTITIONER

## 2022-04-29 PROCEDURE — 02100Z9 BYPASS CORONARY ARTERY, ONE ARTERY FROM LEFT INTERNAL MAMMARY, OPEN APPROACH: ICD-10-PCS

## 2022-04-29 PROCEDURE — 25010000002 VANCOMYCIN 1 G RECONSTITUTED SOLUTION

## 2022-04-29 PROCEDURE — 82947 ASSAY GLUCOSE BLOOD QUANT: CPT

## 2022-04-29 PROCEDURE — 85027 COMPLETE CBC AUTOMATED: CPT | Performed by: NURSE PRACTITIONER

## 2022-04-29 PROCEDURE — 85018 HEMOGLOBIN: CPT

## 2022-04-29 PROCEDURE — 25010000002 HEPARIN (PORCINE) PER 1000 UNITS: Performed by: STUDENT IN AN ORGANIZED HEALTH CARE EDUCATION/TRAINING PROGRAM

## 2022-04-29 PROCEDURE — 25010000002 ALBUMIN HUMAN 25% PER 50 ML

## 2022-04-29 PROCEDURE — 82330 ASSAY OF CALCIUM: CPT | Performed by: NURSE PRACTITIONER

## 2022-04-29 PROCEDURE — 93005 ELECTROCARDIOGRAM TRACING: CPT | Performed by: NURSE PRACTITIONER

## 2022-04-29 PROCEDURE — 94002 VENT MGMT INPAT INIT DAY: CPT

## 2022-04-29 PROCEDURE — 85014 HEMATOCRIT: CPT

## 2022-04-29 PROCEDURE — 83036 HEMOGLOBIN GLYCOSYLATED A1C: CPT | Performed by: INTERNAL MEDICINE

## 2022-04-29 PROCEDURE — 85730 THROMBOPLASTIN TIME PARTIAL: CPT | Performed by: NURSE PRACTITIONER

## 2022-04-29 PROCEDURE — C1713 ANCHOR/SCREW BN/BN,TIS/BN: HCPCS

## 2022-04-29 PROCEDURE — 80048 BASIC METABOLIC PNL TOTAL CA: CPT | Performed by: INTERNAL MEDICINE

## 2022-04-29 PROCEDURE — 80069 RENAL FUNCTION PANEL: CPT | Performed by: NURSE PRACTITIONER

## 2022-04-29 PROCEDURE — 25010000002 PAPAVERINE PER 60 MG

## 2022-04-29 PROCEDURE — 94799 UNLISTED PULMONARY SVC/PX: CPT

## 2022-04-29 PROCEDURE — 33518 CABG ARTERY-VEIN TWO: CPT | Performed by: PHYSICIAN ASSISTANT

## 2022-04-29 PROCEDURE — 85384 FIBRINOGEN ACTIVITY: CPT | Performed by: NURSE PRACTITIONER

## 2022-04-29 PROCEDURE — P9041 ALBUMIN (HUMAN),5%, 50ML: HCPCS | Performed by: NURSE PRACTITIONER

## 2022-04-29 PROCEDURE — 82962 GLUCOSE BLOOD TEST: CPT

## 2022-04-29 PROCEDURE — 0 POTASSIUM CHLORIDE PER 2 MEQ: Performed by: NURSE PRACTITIONER

## 2022-04-29 PROCEDURE — C1729 CATH, DRAINAGE: HCPCS

## 2022-04-29 PROCEDURE — A4648 IMPLANTABLE TISSUE MARKER: HCPCS

## 2022-04-29 PROCEDURE — 021109W BYPASS CORONARY ARTERY, TWO ARTERIES FROM AORTA WITH AUTOLOGOUS VENOUS TISSUE, OPEN APPROACH: ICD-10-PCS

## 2022-04-29 PROCEDURE — 85025 COMPLETE CBC W/AUTO DIFF WBC: CPT | Performed by: NURSE PRACTITIONER

## 2022-04-29 PROCEDURE — 25010000002 ALBUMIN HUMAN 5% PER 50 ML: Performed by: NURSE PRACTITIONER

## 2022-04-29 PROCEDURE — 33533 CABG ARTERIAL SINGLE: CPT

## 2022-04-29 PROCEDURE — 85610 PROTHROMBIN TIME: CPT | Performed by: NURSE PRACTITIONER

## 2022-04-29 PROCEDURE — 25010000002 MAGNESIUM SULFATE IN D5W 1G/100ML (PREMIX) 1-5 GM/100ML-% SOLUTION: Performed by: NURSE PRACTITIONER

## 2022-04-29 PROCEDURE — 33518 CABG ARTERY-VEIN TWO: CPT

## 2022-04-29 PROCEDURE — 25010000002 PROPOFOL 10 MG/ML EMULSION: Performed by: STUDENT IN AN ORGANIZED HEALTH CARE EDUCATION/TRAINING PROGRAM

## 2022-04-29 DEVICE — SS SUTURE, 3 PER SLEEVE
Type: IMPLANTABLE DEVICE | Site: STERNUM | Status: FUNCTIONAL
Brand: MYO/WIRE II

## 2022-04-29 DEVICE — SS SUTURE, 4 PER SLEEVE
Type: IMPLANTABLE DEVICE | Site: STERNUM | Status: FUNCTIONAL
Brand: MYO/WIRE II

## 2022-04-29 RX ORDER — BISACODYL 10 MG
10 SUPPOSITORY, RECTAL RECTAL DAILY PRN
Status: DISCONTINUED | OUTPATIENT
Start: 2022-04-30 | End: 2022-05-04 | Stop reason: HOSPADM

## 2022-04-29 RX ORDER — HEPARIN SODIUM 5000 [USP'U]/ML
INJECTION, SOLUTION INTRAVENOUS; SUBCUTANEOUS AS NEEDED
Status: DISCONTINUED | OUTPATIENT
Start: 2022-04-29 | End: 2022-04-29 | Stop reason: HOSPADM

## 2022-04-29 RX ORDER — POTASSIUM CHLORIDE 29.8 MG/ML
20 INJECTION INTRAVENOUS
Status: COMPLETED | OUTPATIENT
Start: 2022-04-29 | End: 2022-04-29

## 2022-04-29 RX ORDER — POLYETHYLENE GLYCOL 3350 17 G/17G
17 POWDER, FOR SOLUTION ORAL DAILY PRN
Status: DISCONTINUED | OUTPATIENT
Start: 2022-04-29 | End: 2022-05-04 | Stop reason: HOSPADM

## 2022-04-29 RX ORDER — MIDAZOLAM HYDROCHLORIDE 1 MG/ML
2 INJECTION INTRAMUSCULAR; INTRAVENOUS
Status: DISCONTINUED | OUTPATIENT
Start: 2022-04-29 | End: 2022-04-30

## 2022-04-29 RX ORDER — MAGNESIUM SULFATE HEPTAHYDRATE 500 MG/ML
INJECTION, SOLUTION INTRAMUSCULAR; INTRAVENOUS AS NEEDED
Status: DISCONTINUED | OUTPATIENT
Start: 2022-04-29 | End: 2022-04-29 | Stop reason: SURG

## 2022-04-29 RX ORDER — SODIUM CHLORIDE 9 MG/ML
15 INJECTION, SOLUTION INTRAVENOUS CONTINUOUS
Status: DISCONTINUED | OUTPATIENT
Start: 2022-04-29 | End: 2022-05-01

## 2022-04-29 RX ORDER — PROTAMINE SULFATE 10 MG/ML
INJECTION, SOLUTION INTRAVENOUS AS NEEDED
Status: DISCONTINUED | OUTPATIENT
Start: 2022-04-29 | End: 2022-04-29 | Stop reason: SURG

## 2022-04-29 RX ORDER — FENTANYL CITRATE 50 UG/ML
50 INJECTION, SOLUTION INTRAMUSCULAR; INTRAVENOUS
Status: DISCONTINUED | OUTPATIENT
Start: 2022-04-29 | End: 2022-04-29 | Stop reason: HOSPADM

## 2022-04-29 RX ORDER — ACETAMINOPHEN 325 MG/1
650 TABLET ORAL EVERY 4 HOURS PRN
Status: DISCONTINUED | OUTPATIENT
Start: 2022-04-30 | End: 2022-05-04 | Stop reason: HOSPADM

## 2022-04-29 RX ORDER — MEPERIDINE HYDROCHLORIDE 25 MG/ML
25 INJECTION INTRAMUSCULAR; INTRAVENOUS; SUBCUTANEOUS EVERY 4 HOURS PRN
Status: DISCONTINUED | OUTPATIENT
Start: 2022-04-29 | End: 2022-04-30

## 2022-04-29 RX ORDER — POTASSIUM CHLORIDE 750 MG/1
40 TABLET, FILM COATED, EXTENDED RELEASE ORAL AS NEEDED
Status: DISCONTINUED | OUTPATIENT
Start: 2022-04-29 | End: 2022-05-04 | Stop reason: HOSPADM

## 2022-04-29 RX ORDER — ALPRAZOLAM 0.25 MG/1
0.25 TABLET ORAL EVERY 8 HOURS PRN
Status: DISCONTINUED | OUTPATIENT
Start: 2022-04-29 | End: 2022-05-04 | Stop reason: HOSPADM

## 2022-04-29 RX ORDER — PHENYLEPHRINE HCL IN 0.9% NACL 0.5 MG/5ML
.2-2 SYRINGE (ML) INTRAVENOUS CONTINUOUS PRN
Status: DISCONTINUED | OUTPATIENT
Start: 2022-04-29 | End: 2022-04-30

## 2022-04-29 RX ORDER — MIDAZOLAM HYDROCHLORIDE 1 MG/ML
0.5 INJECTION INTRAMUSCULAR; INTRAVENOUS
Status: DISCONTINUED | OUTPATIENT
Start: 2022-04-29 | End: 2022-04-29 | Stop reason: HOSPADM

## 2022-04-29 RX ORDER — SODIUM CHLORIDE, SODIUM LACTATE, POTASSIUM CHLORIDE, CALCIUM CHLORIDE 600; 310; 30; 20 MG/100ML; MG/100ML; MG/100ML; MG/100ML
9 INJECTION, SOLUTION INTRAVENOUS CONTINUOUS
Status: DISCONTINUED | OUTPATIENT
Start: 2022-04-29 | End: 2022-04-29

## 2022-04-29 RX ORDER — DEXMEDETOMIDINE HYDROCHLORIDE 4 UG/ML
.2-1.5 INJECTION INTRAVENOUS
Status: DISCONTINUED | OUTPATIENT
Start: 2022-04-29 | End: 2022-04-30

## 2022-04-29 RX ORDER — ACETAMINOPHEN 325 MG/1
650 TABLET ORAL EVERY 4 HOURS
Status: DISCONTINUED | OUTPATIENT
Start: 2022-04-29 | End: 2022-04-30

## 2022-04-29 RX ORDER — FAMOTIDINE 10 MG/ML
20 INJECTION, SOLUTION INTRAVENOUS ONCE
Status: DISCONTINUED | OUTPATIENT
Start: 2022-04-29 | End: 2022-04-29 | Stop reason: HOSPADM

## 2022-04-29 RX ORDER — MAGNESIUM SULFATE 1 G/100ML
1 INJECTION INTRAVENOUS EVERY 8 HOURS
Status: DISCONTINUED | OUTPATIENT
Start: 2022-04-29 | End: 2022-04-30

## 2022-04-29 RX ORDER — POTASSIUM CHLORIDE 7.45 MG/ML
10 INJECTION INTRAVENOUS
Status: DISCONTINUED | OUTPATIENT
Start: 2022-04-29 | End: 2022-05-04 | Stop reason: HOSPADM

## 2022-04-29 RX ORDER — LIDOCAINE HYDROCHLORIDE 10 MG/ML
0.5 INJECTION, SOLUTION EPIDURAL; INFILTRATION; INTRACAUDAL; PERINEURAL ONCE AS NEEDED
Status: DISCONTINUED | OUTPATIENT
Start: 2022-04-29 | End: 2022-04-29 | Stop reason: HOSPADM

## 2022-04-29 RX ORDER — ATORVASTATIN CALCIUM 20 MG/1
40 TABLET, FILM COATED ORAL NIGHTLY
Status: DISCONTINUED | OUTPATIENT
Start: 2022-04-29 | End: 2022-05-04 | Stop reason: HOSPADM

## 2022-04-29 RX ORDER — CEFAZOLIN SODIUM 2 G/100ML
2 INJECTION, SOLUTION INTRAVENOUS EVERY 8 HOURS
Status: COMPLETED | OUTPATIENT
Start: 2022-04-29 | End: 2022-05-01

## 2022-04-29 RX ORDER — CYCLOBENZAPRINE HCL 10 MG
10 TABLET ORAL EVERY 8 HOURS PRN
Status: DISCONTINUED | OUTPATIENT
Start: 2022-04-30 | End: 2022-05-04 | Stop reason: HOSPADM

## 2022-04-29 RX ORDER — MIDAZOLAM HYDROCHLORIDE 1 MG/ML
INJECTION INTRAMUSCULAR; INTRAVENOUS AS NEEDED
Status: DISCONTINUED | OUTPATIENT
Start: 2022-04-29 | End: 2022-04-29 | Stop reason: SURG

## 2022-04-29 RX ORDER — HEPARIN SODIUM 1000 [USP'U]/ML
INJECTION, SOLUTION INTRAVENOUS; SUBCUTANEOUS AS NEEDED
Status: DISCONTINUED | OUTPATIENT
Start: 2022-04-29 | End: 2022-04-29 | Stop reason: SURG

## 2022-04-29 RX ORDER — KETAMINE HYDROCHLORIDE 10 MG/ML
INJECTION INTRAMUSCULAR; INTRAVENOUS AS NEEDED
Status: DISCONTINUED | OUTPATIENT
Start: 2022-04-29 | End: 2022-04-29 | Stop reason: SURG

## 2022-04-29 RX ORDER — ACETAMINOPHEN 160 MG/5ML
650 SOLUTION ORAL EVERY 4 HOURS PRN
Status: DISCONTINUED | OUTPATIENT
Start: 2022-04-30 | End: 2022-05-04 | Stop reason: HOSPADM

## 2022-04-29 RX ORDER — POTASSIUM CHLORIDE 29.8 MG/ML
20 INJECTION INTRAVENOUS
Status: DISCONTINUED | OUTPATIENT
Start: 2022-04-29 | End: 2022-04-30

## 2022-04-29 RX ORDER — MAGNESIUM HYDROXIDE 1200 MG/15ML
LIQUID ORAL AS NEEDED
Status: DISCONTINUED | OUTPATIENT
Start: 2022-04-29 | End: 2022-04-29 | Stop reason: HOSPADM

## 2022-04-29 RX ORDER — PROPOFOL 10 MG/ML
VIAL (ML) INTRAVENOUS AS NEEDED
Status: DISCONTINUED | OUTPATIENT
Start: 2022-04-29 | End: 2022-04-29 | Stop reason: SURG

## 2022-04-29 RX ORDER — BISACODYL 5 MG/1
10 TABLET, DELAYED RELEASE ORAL DAILY PRN
Status: DISCONTINUED | OUTPATIENT
Start: 2022-04-29 | End: 2022-05-04 | Stop reason: HOSPADM

## 2022-04-29 RX ORDER — ONDANSETRON 2 MG/ML
4 INJECTION INTRAMUSCULAR; INTRAVENOUS EVERY 6 HOURS PRN
Status: DISCONTINUED | OUTPATIENT
Start: 2022-04-29 | End: 2022-05-04 | Stop reason: HOSPADM

## 2022-04-29 RX ORDER — CHLORHEXIDINE GLUCONATE 0.12 MG/ML
15 RINSE ORAL ONCE
Status: COMPLETED | OUTPATIENT
Start: 2022-04-29 | End: 2022-04-29

## 2022-04-29 RX ORDER — NOREPINEPHRINE BIT/0.9 % NACL 8 MG/250ML
.02-.3 INFUSION BOTTLE (ML) INTRAVENOUS CONTINUOUS PRN
Status: DISCONTINUED | OUTPATIENT
Start: 2022-04-29 | End: 2022-04-30

## 2022-04-29 RX ORDER — ACETAMINOPHEN 650 MG/1
650 SUPPOSITORY RECTAL EVERY 4 HOURS PRN
Status: DISCONTINUED | OUTPATIENT
Start: 2022-04-30 | End: 2022-05-04 | Stop reason: HOSPADM

## 2022-04-29 RX ORDER — PAPAVERINE HYDROCHLORIDE 30 MG/ML
INJECTION INTRAMUSCULAR; INTRAVENOUS AS NEEDED
Status: DISCONTINUED | OUTPATIENT
Start: 2022-04-29 | End: 2022-04-29 | Stop reason: HOSPADM

## 2022-04-29 RX ORDER — NICOTINE POLACRILEX 4 MG
15 LOZENGE BUCCAL
Status: DISCONTINUED | OUTPATIENT
Start: 2022-04-29 | End: 2022-04-30

## 2022-04-29 RX ORDER — PANTOPRAZOLE SODIUM 40 MG/10ML
40 INJECTION, POWDER, LYOPHILIZED, FOR SOLUTION INTRAVENOUS ONCE
Status: DISCONTINUED | OUTPATIENT
Start: 2022-04-29 | End: 2022-04-30

## 2022-04-29 RX ORDER — POTASSIUM CHLORIDE 1.5 G/1.77G
40 POWDER, FOR SOLUTION ORAL AS NEEDED
Status: DISCONTINUED | OUTPATIENT
Start: 2022-04-29 | End: 2022-05-04 | Stop reason: HOSPADM

## 2022-04-29 RX ORDER — DEXMEDETOMIDINE HYDROCHLORIDE 4 UG/ML
INJECTION, SOLUTION INTRAVENOUS CONTINUOUS PRN
Status: DISCONTINUED | OUTPATIENT
Start: 2022-04-29 | End: 2022-04-29 | Stop reason: SURG

## 2022-04-29 RX ORDER — ALBUMIN, HUMAN INJ 5% 5 %
1500 SOLUTION INTRAVENOUS AS NEEDED
Status: DISCONTINUED | OUTPATIENT
Start: 2022-04-29 | End: 2022-04-30

## 2022-04-29 RX ORDER — NITROGLYCERIN 20 MG/100ML
5-200 INJECTION INTRAVENOUS
Status: DISCONTINUED | OUTPATIENT
Start: 2022-04-29 | End: 2022-04-30

## 2022-04-29 RX ORDER — SODIUM CHLORIDE 0.9 % (FLUSH) 0.9 %
3 SYRINGE (ML) INJECTION EVERY 12 HOURS SCHEDULED
Status: DISCONTINUED | OUTPATIENT
Start: 2022-04-29 | End: 2022-04-29 | Stop reason: HOSPADM

## 2022-04-29 RX ORDER — HYDROCODONE BITARTRATE AND ACETAMINOPHEN 5; 325 MG/1; MG/1
2 TABLET ORAL EVERY 4 HOURS PRN
Status: DISCONTINUED | OUTPATIENT
Start: 2022-04-29 | End: 2022-05-04 | Stop reason: HOSPADM

## 2022-04-29 RX ORDER — AMOXICILLIN 250 MG
2 CAPSULE ORAL NIGHTLY
Status: DISCONTINUED | OUTPATIENT
Start: 2022-04-30 | End: 2022-05-04 | Stop reason: HOSPADM

## 2022-04-29 RX ORDER — NALOXONE HCL 0.4 MG/ML
0.4 VIAL (ML) INJECTION
Status: DISCONTINUED | OUTPATIENT
Start: 2022-04-29 | End: 2022-05-04 | Stop reason: HOSPADM

## 2022-04-29 RX ORDER — MIDAZOLAM HYDROCHLORIDE 1 MG/ML
INJECTION INTRAMUSCULAR; INTRAVENOUS
Status: COMPLETED | OUTPATIENT
Start: 2022-04-29 | End: 2022-04-29

## 2022-04-29 RX ORDER — DOPAMINE HYDROCHLORIDE 160 MG/100ML
2-20 INJECTION, SOLUTION INTRAVENOUS CONTINUOUS PRN
Status: DISCONTINUED | OUTPATIENT
Start: 2022-04-29 | End: 2022-04-30

## 2022-04-29 RX ORDER — ROCURONIUM BROMIDE 10 MG/ML
INJECTION, SOLUTION INTRAVENOUS AS NEEDED
Status: DISCONTINUED | OUTPATIENT
Start: 2022-04-29 | End: 2022-04-29 | Stop reason: SURG

## 2022-04-29 RX ORDER — ACETAMINOPHEN 160 MG/5ML
650 SOLUTION ORAL EVERY 4 HOURS
Status: DISCONTINUED | OUTPATIENT
Start: 2022-04-29 | End: 2022-04-30

## 2022-04-29 RX ORDER — CHLORHEXIDINE GLUCONATE 0.12 MG/ML
15 RINSE ORAL EVERY 12 HOURS
Status: DISCONTINUED | OUTPATIENT
Start: 2022-04-29 | End: 2022-04-30

## 2022-04-29 RX ORDER — ENOXAPARIN SODIUM 100 MG/ML
40 INJECTION SUBCUTANEOUS DAILY
Status: DISCONTINUED | OUTPATIENT
Start: 2022-04-30 | End: 2022-05-04 | Stop reason: HOSPADM

## 2022-04-29 RX ORDER — FENTANYL CITRATE 50 UG/ML
INJECTION, SOLUTION INTRAMUSCULAR; INTRAVENOUS AS NEEDED
Status: DISCONTINUED | OUTPATIENT
Start: 2022-04-29 | End: 2022-04-29 | Stop reason: SURG

## 2022-04-29 RX ORDER — SODIUM CHLORIDE 0.9 % (FLUSH) 0.9 %
3-10 SYRINGE (ML) INJECTION AS NEEDED
Status: DISCONTINUED | OUTPATIENT
Start: 2022-04-29 | End: 2022-04-29 | Stop reason: HOSPADM

## 2022-04-29 RX ORDER — ASPIRIN 81 MG/1
81 TABLET ORAL DAILY
Status: DISCONTINUED | OUTPATIENT
Start: 2022-04-30 | End: 2022-05-04 | Stop reason: HOSPADM

## 2022-04-29 RX ORDER — AMINOCAPROIC ACID 250 MG/ML
INJECTION, SOLUTION INTRAVENOUS AS NEEDED
Status: DISCONTINUED | OUTPATIENT
Start: 2022-04-29 | End: 2022-04-29 | Stop reason: SURG

## 2022-04-29 RX ORDER — MORPHINE SULFATE 2 MG/ML
1 INJECTION, SOLUTION INTRAMUSCULAR; INTRAVENOUS EVERY 4 HOURS PRN
Status: DISCONTINUED | OUTPATIENT
Start: 2022-04-29 | End: 2022-05-04 | Stop reason: HOSPADM

## 2022-04-29 RX ORDER — METOCLOPRAMIDE HYDROCHLORIDE 5 MG/ML
10 INJECTION INTRAMUSCULAR; INTRAVENOUS EVERY 6 HOURS
Status: DISCONTINUED | OUTPATIENT
Start: 2022-04-29 | End: 2022-04-30

## 2022-04-29 RX ORDER — NICARDIPINE HYDROCHLORIDE 2.5 MG/ML
INJECTION INTRAVENOUS AS NEEDED
Status: DISCONTINUED | OUTPATIENT
Start: 2022-04-29 | End: 2022-04-29 | Stop reason: SURG

## 2022-04-29 RX ORDER — MORPHINE SULFATE 2 MG/ML
4 INJECTION, SOLUTION INTRAMUSCULAR; INTRAVENOUS
Status: DISCONTINUED | OUTPATIENT
Start: 2022-04-29 | End: 2022-04-30

## 2022-04-29 RX ORDER — OXYCODONE HYDROCHLORIDE 5 MG/1
10 TABLET ORAL EVERY 4 HOURS PRN
Status: DISCONTINUED | OUTPATIENT
Start: 2022-04-29 | End: 2022-05-04 | Stop reason: HOSPADM

## 2022-04-29 RX ORDER — PANTOPRAZOLE SODIUM 40 MG/1
40 TABLET, DELAYED RELEASE ORAL EVERY MORNING
Status: DISCONTINUED | OUTPATIENT
Start: 2022-04-30 | End: 2022-04-30

## 2022-04-29 RX ORDER — DEXTROSE MONOHYDRATE 25 G/50ML
10-50 INJECTION, SOLUTION INTRAVENOUS
Status: DISCONTINUED | OUTPATIENT
Start: 2022-04-29 | End: 2022-04-30

## 2022-04-29 RX ORDER — FENTANYL CITRATE 50 UG/ML
INJECTION, SOLUTION INTRAMUSCULAR; INTRAVENOUS
Status: COMPLETED | OUTPATIENT
Start: 2022-04-29 | End: 2022-04-29

## 2022-04-29 RX ORDER — ACETAMINOPHEN 650 MG/1
650 SUPPOSITORY RECTAL EVERY 4 HOURS
Status: DISCONTINUED | OUTPATIENT
Start: 2022-04-29 | End: 2022-04-30

## 2022-04-29 RX ORDER — PROPOFOL 10 MG/ML
VIAL (ML) INTRAVENOUS CONTINUOUS PRN
Status: DISCONTINUED | OUTPATIENT
Start: 2022-04-29 | End: 2022-04-29 | Stop reason: SURG

## 2022-04-29 RX ORDER — MILRINONE LACTATE 0.2 MG/ML
.25-.375 INJECTION, SOLUTION INTRAVENOUS CONTINUOUS PRN
Status: DISCONTINUED | OUTPATIENT
Start: 2022-04-29 | End: 2022-04-30

## 2022-04-29 RX ADMIN — ALBUMIN HUMAN 500 ML: 0.05 INJECTION, SOLUTION INTRAVENOUS at 18:38

## 2022-04-29 RX ADMIN — PROPOFOL 30 MG: 10 INJECTION, EMULSION INTRAVENOUS at 11:07

## 2022-04-29 RX ADMIN — MUPIROCIN 1 APPLICATION: 20 OINTMENT TOPICAL at 21:24

## 2022-04-29 RX ADMIN — NICARDIPINE HYDROCHLORIDE 0.4 MG: 2.5 INJECTION, SOLUTION INTRAVENOUS at 14:08

## 2022-04-29 RX ADMIN — KETAMINE HYDROCHLORIDE 10 MG: 10 INJECTION INTRAMUSCULAR; INTRAVENOUS at 13:02

## 2022-04-29 RX ADMIN — POTASSIUM CHLORIDE 20 MEQ: 29.8 INJECTION, SOLUTION INTRAVENOUS at 22:06

## 2022-04-29 RX ADMIN — ACETAMINOPHEN 650 MG: 325 TABLET ORAL at 18:10

## 2022-04-29 RX ADMIN — DEXMEDETOMIDINE HYDROCHLORIDE 0.2 MCG/KG/HR: 4 INJECTION INTRAVENOUS at 16:55

## 2022-04-29 RX ADMIN — MAGNESIUM SULFATE HEPTAHYDRATE 1 G: 1 INJECTION, SOLUTION INTRAVENOUS at 16:33

## 2022-04-29 RX ADMIN — FENTANYL CITRATE 50 MCG: 0.05 INJECTION, SOLUTION INTRAMUSCULAR; INTRAVENOUS at 09:29

## 2022-04-29 RX ADMIN — ROCURONIUM BROMIDE 10 MG: 50 INJECTION INTRAVENOUS at 12:17

## 2022-04-29 RX ADMIN — PROTAMINE SULFATE 300 MG: 10 INJECTION, SOLUTION INTRAVENOUS at 13:31

## 2022-04-29 RX ADMIN — MUPIROCIN 1 APPLICATION: 20 OINTMENT TOPICAL at 08:25

## 2022-04-29 RX ADMIN — NICARDIPINE HYDROCHLORIDE 0.4 MG: 2.5 INJECTION, SOLUTION INTRAVENOUS at 13:35

## 2022-04-29 RX ADMIN — FENTANYL CITRATE 100 MCG: 0.05 INJECTION, SOLUTION INTRAMUSCULAR; INTRAVENOUS at 10:27

## 2022-04-29 RX ADMIN — FENTANYL CITRATE 100 MCG: 0.05 INJECTION, SOLUTION INTRAMUSCULAR; INTRAVENOUS at 11:40

## 2022-04-29 RX ADMIN — HEPARIN SODIUM 30000 UNITS: 1000 INJECTION INTRAVENOUS; SUBCUTANEOUS at 11:52

## 2022-04-29 RX ADMIN — METOPROLOL TARTRATE 12.5 MG: 25 TABLET, FILM COATED ORAL at 08:24

## 2022-04-29 RX ADMIN — CHLORHEXIDINE GLUCONATE 15 ML: 1.2 RINSE ORAL at 08:25

## 2022-04-29 RX ADMIN — Medication 25 MCG/KG/MIN: at 10:29

## 2022-04-29 RX ADMIN — KETAMINE HYDROCHLORIDE 20 MG: 10 INJECTION INTRAMUSCULAR; INTRAVENOUS at 12:17

## 2022-04-29 RX ADMIN — MORPHINE SULFATE 2 MG: 2 INJECTION, SOLUTION INTRAMUSCULAR; INTRAVENOUS at 17:20

## 2022-04-29 RX ADMIN — MIDAZOLAM 1 MG: 1 INJECTION INTRAMUSCULAR; INTRAVENOUS at 09:29

## 2022-04-29 RX ADMIN — AMINOCAPROIC ACID 10 G: 250 INJECTION, SOLUTION INTRAVENOUS at 11:53

## 2022-04-29 RX ADMIN — ROCURONIUM BROMIDE 20 MG: 50 INJECTION INTRAVENOUS at 13:02

## 2022-04-29 RX ADMIN — FENTANYL CITRATE 100 MCG: 0.05 INJECTION, SOLUTION INTRAMUSCULAR; INTRAVENOUS at 13:31

## 2022-04-29 RX ADMIN — CEFAZOLIN SODIUM 2 G: 2 INJECTION, SOLUTION INTRAVENOUS at 13:39

## 2022-04-29 RX ADMIN — ATORVASTATIN CALCIUM 40 MG: 20 TABLET, FILM COATED ORAL at 21:24

## 2022-04-29 RX ADMIN — ROCURONIUM BROMIDE 20 MG: 50 INJECTION INTRAVENOUS at 11:03

## 2022-04-29 RX ADMIN — KETAMINE HYDROCHLORIDE 10 MG: 10 INJECTION INTRAMUSCULAR; INTRAVENOUS at 11:40

## 2022-04-29 RX ADMIN — DEXMEDETOMIDINE HYDROCHLORIDE 0.5 MCG/KG/HR: 4 INJECTION, SOLUTION INTRAVENOUS at 10:31

## 2022-04-29 RX ADMIN — PHENYLEPHRINE HYDROCHLORIDE 0.5 MCG/KG/MIN: 10 INJECTION INTRAVENOUS at 10:29

## 2022-04-29 RX ADMIN — FENTANYL CITRATE 50 MCG: 0.05 INJECTION, SOLUTION INTRAMUSCULAR; INTRAVENOUS at 10:00

## 2022-04-29 RX ADMIN — FENTANYL CITRATE 50 MCG: 0.05 INJECTION, SOLUTION INTRAMUSCULAR; INTRAVENOUS at 13:36

## 2022-04-29 RX ADMIN — MORPHINE SULFATE 4 MG: 2 INJECTION, SOLUTION INTRAMUSCULAR; INTRAVENOUS at 16:30

## 2022-04-29 RX ADMIN — FENTANYL CITRATE 100 MCG: 0.05 INJECTION, SOLUTION INTRAMUSCULAR; INTRAVENOUS at 14:08

## 2022-04-29 RX ADMIN — ACETAMINOPHEN 650 MG: 325 TABLET ORAL at 23:33

## 2022-04-29 RX ADMIN — KETAMINE HYDROCHLORIDE 10 MG: 10 INJECTION INTRAMUSCULAR; INTRAVENOUS at 10:27

## 2022-04-29 RX ADMIN — SODIUM CHLORIDE 3 UNITS/HR: 9 INJECTION, SOLUTION INTRAVENOUS at 11:01

## 2022-04-29 RX ADMIN — OXYCODONE 10 MG: 5 TABLET ORAL at 18:10

## 2022-04-29 RX ADMIN — SODIUM CHLORIDE 30 ML/HR: 9 INJECTION, SOLUTION INTRAVENOUS at 15:47

## 2022-04-29 RX ADMIN — MAGNESIUM SULFATE HEPTAHYDRATE 1 G: 1 INJECTION, SOLUTION INTRAVENOUS at 23:54

## 2022-04-29 RX ADMIN — MIDAZOLAM 1 MG: 1 INJECTION INTRAMUSCULAR; INTRAVENOUS at 09:55

## 2022-04-29 RX ADMIN — NICARDIPINE HYDROCHLORIDE 2.5 MG/HR: 2.5 INJECTION, SOLUTION INTRAVENOUS at 11:07

## 2022-04-29 RX ADMIN — CEFAZOLIN SODIUM 2 G: 2 INJECTION, SOLUTION INTRAVENOUS at 21:24

## 2022-04-29 RX ADMIN — NICARDIPINE HYDROCHLORIDE 0.4 MG: 2.5 INJECTION, SOLUTION INTRAVENOUS at 13:38

## 2022-04-29 RX ADMIN — PROPOFOL 70 MG: 10 INJECTION, EMULSION INTRAVENOUS at 10:27

## 2022-04-29 RX ADMIN — ROCURONIUM BROMIDE 50 MG: 50 INJECTION INTRAVENOUS at 10:27

## 2022-04-29 RX ADMIN — OXYCODONE 10 MG: 5 TABLET ORAL at 22:05

## 2022-04-29 RX ADMIN — MORPHINE SULFATE 2 MG: 2 INJECTION, SOLUTION INTRAMUSCULAR; INTRAVENOUS at 18:21

## 2022-04-29 RX ADMIN — FENTANYL CITRATE 100 MCG: 0.05 INJECTION, SOLUTION INTRAMUSCULAR; INTRAVENOUS at 11:07

## 2022-04-29 RX ADMIN — CEFAZOLIN SODIUM 2 G: 2 INJECTION, SOLUTION INTRAVENOUS at 10:35

## 2022-04-29 RX ADMIN — MIDAZOLAM 1 MG: 1 INJECTION INTRAMUSCULAR; INTRAVENOUS at 10:04

## 2022-04-29 RX ADMIN — MAGNESIUM SULFATE HEPTAHYDRATE 2 G: 500 INJECTION, SOLUTION INTRAMUSCULAR; INTRAVENOUS at 13:23

## 2022-04-29 NOTE — ANESTHESIA PROCEDURE NOTES
Central Line      Patient reassessed immediately prior to procedure    Patient location during procedure: OR  Start time: 4/29/2022 10:05 AM  Stop Time:4/29/2022 10:15 AM  Indications: central pressure monitoring, vascular access and MD/Surgeon request  Staff  Anesthesiologist: Johnson Gresham MD  Preanesthetic Checklist  Completed: patient identified, IV checked, site marked, risks and benefits discussed, surgical consent, monitors and equipment checked, pre-op evaluation and timeout performed  Central Line Prep  Sterile Tech:cap, gloves, gown, mask and sterile barriers  Prep: chloraprep  Patient monitoring: blood pressure monitoring, continuous pulse oximetry and EKG  Central Line Procedure  Laterality:right  Location:internal jugular  Catheter Type:Cordis and MAC  Catheter Size:9 Fr  Guidance:ultrasound guided  PROCEDURE NOTE/ULTRASOUND INTERPRETATION.  Using ultrasound guidance the potential vascular sites for insertion of the catheter were visualized to determine the patency of the vessel to be used for vascular access.  After selecting the appropriate site for insertion, the needle was visualized under ultrasound being inserted into the internal jugular vein, followed by ultrasound confirmation of wire and catheter placement. There were no abnormalities seen on ultrasound; an image was taken; and the patient tolerated the procedure with no complications. Images: still images obtained, printed/placed on chart  Assessment  Post procedure:biopatch applied, line sutured and occlusive dressing applied  Assessement:blood return through all ports, free fluid flow, no pneumothorax on x-ray and placement verified by x-ray  Complications:no  Patient Tolerance:patient tolerated the procedure well with no apparent complications

## 2022-04-29 NOTE — ANESTHESIA POSTPROCEDURE EVALUATION
Patient: Scarlett Ray    Procedure Summary     Date: 04/29/22 Room / Location: [address] CARDIOVASCULAR OPERATING ROOM    Anesthesia Start: 0949 Anesthesia Stop: 1513    Procedure: MIDLINE STERNOTOMY, CORONARY ARTERY BYPASS GRAFTING X3 , UTILIZING THE RIGHT SAPHENOUS VEIN AND LEFT SAM, RAINE, PRP (N/A Chest) Diagnosis:       Coronary artery disease involving native coronary artery of native heart with unstable angina pectoris (HCC)      Abnormal findings on diagnostic imaging of heart and coronary circulation      (Coronary artery disease involving native coronary artery of native heart with unstable angina pectoris (HCC) [I25.110])      (Abnormal findings on diagnostic imaging of heart and coronary circulation [R93.1])    Surgeons: Rodolfo Castellon MD Provider: Johnson Gresham MD    Anesthesia Type: general, Jo, CVL, PAC ASA Status: 4          Anesthesia Type: general, Jo, CVL, PAC    Vitals  Vitals Value Taken Time   /63 04/29/22 1502   Temp     Pulse 73 04/29/22 1513   Resp     SpO2 93 % 04/29/22 1513   Vitals shown include unvalidated device data.        Post Anesthesia Care and Evaluation    Patient location during evaluation: bedside  Patient participation: complete - patient cannot participate  Level of consciousness: obtunded/minimal responses  Pain management: adequate  Airway patency: Intubated.  Anesthetic complications: No anesthetic complications  PONV Status: controlled  Cardiovascular status: acceptable  Respiratory status: ETT  Hydration status: acceptable    Comments: S/p CABG x3

## 2022-04-29 NOTE — ANESTHESIA PREPROCEDURE EVALUATION
Anesthesia Evaluation     Patient summary reviewed and Nursing notes reviewed   no history of anesthetic complications:  NPO Solid Status: > 8 hours  NPO Liquid Status: > 2 hours           Airway   Mallampati: III  TM distance: <3 FB  Neck ROM: full  Small opening and Anterior  Dental - normal exam     Pulmonary    (+) asthma,sleep apnea on CPAP,   Cardiovascular   Exercise tolerance: poor (<4 METS)    ECG reviewed    (+) hypertension, CAD, angina, hyperlipidemia,     ROS comment: LHC: · Severe multivessel coronary artery disease involving the distal left main trifurcation, mid LAD, ostial ramus, ostial circumflex.  · Normal LV filling pressures and function        Neuro/Psych- negative ROS  GI/Hepatic/Renal/Endo    (+) obesity, morbid obesity,  diabetes mellitus type 2 using insulin,     Musculoskeletal     Abdominal    Substance History      OB/GYN          Other                        Anesthesia Plan    ASA 4     general, Jo, CVL and PAC   (I have reviewed the patient's history with the patient and the chart, including all pertinent laboratory results and imaging. I have explained the risks of anesthesia including but not limited to dental damage, corneal abrasion, nerve injury, MI, stroke, and death. Questions asked and answered. Anesthetic plan discussed with patient and team as indicated. Patient expressed understanding of the above.  )  intravenous induction   Postoperative Plan: Expected vent after surgery  Anesthetic plan, all risks, benefits, and alternatives have been provided, discussed and informed consent has been obtained with: patient.  Use of blood products discussed with patient  Consented to blood products.       CODE STATUS:

## 2022-04-29 NOTE — ANESTHESIA PROCEDURE NOTES
Diagnostic Intraop RAINE    Procedure Performed: Diagnostic Intraop RAINE     Start Time:        End Time:        General Procedure Information  Physician Requesting Echo: Rodolfo Castellon MD  Location performed:  OR  Intubated  Bite block placed  Heart visualized  Probe Insertion:  Easy  Probe Type:  Multiplane  Modalities:  2D only, color flow mapping, continuous wave Doppler and pulse wave Doppler    Echocardiographic and Doppler Measurements    Ventricles    Right Ventricle:  Diastolic dimension 3.6 cm.    Left Ventricle:  Global Function normal.                          Diastolic Function Measurements:  Diastolic Dysfunction Grade= indeterminate  E= ms  A= ms  E/A Ratio=   DT= ms  S/D=  IVRT=        Anesthesia Information  Performed Personally  Anesthesiologist:  Johnson Gresham MD      Echocardiogram Comments:       66 year old female with severe multivessel CAD presents for CABG.  - Normal LV size, function (LVF >55%, no significant RWMAs)  - Normal RV size, function  - No significant valvular abnormalities  - No intracardiac shunt  - Trace pericardial effusion  - No aortic dissection    S/p CABG x3:  - Normal BiV  - No significant valvular abnormalities  - No pericardial/pleural effusion  - No aortic dissection    Findings discussed with surgical team

## 2022-04-29 NOTE — ANESTHESIA PROCEDURE NOTES
Airway  Urgency: elective    Date/Time: 4/29/2022 10:29 AM  Airway not difficult    General Information and Staff    Patient location during procedure: OR  Anesthesiologist: Johnson Gresham MD    Indications and Patient Condition  Indications for airway management: airway protection    Preoxygenated: yes  MILS maintained throughout  Mask difficulty assessment: 4 - unable to mask vent +/- NMBA    Final Airway Details  Final airway type: endotracheal airway      Successful airway: ETT  Cuffed: yes   Successful intubation technique: direct laryngoscopy  Endotracheal tube insertion site: oral  Blade: Negro  Blade size: 2  ETT size (mm): 7.5  Cormack-Lehane Classification: grade IIa - partial view of glottis  Placement verified by: chest auscultation and capnometry   Measured from: teeth  ETT/EBT  to teeth (cm): 20  Number of attempts at approach: 1  Assessment: lips, teeth, and gum same as pre-op and atraumatic intubation    Additional Comments  Unable to mask despite NMB, oral airway  Very anterior as expected  Decent view with Mil blade; suspect Grade III-IV with Mac blade

## 2022-04-29 NOTE — ANESTHESIA PROCEDURE NOTES
Arterial Line      Patient location during procedure: holding area  Start time: 4/29/2022 9:30 AM  Stop Time:4/29/2022 9:35 AM       Line placed for respiratory failure, hemodynamic monitoring and ABGs/Labs/ISTAT.  Performed By   Anesthesiologist: Johnson Gresham MD  Preanesthetic Checklist  Completed: patient identified, IV checked, site marked, risks and benefits discussed, surgical consent, monitors and equipment checked, pre-op evaluation and timeout performed  Arterial Line Prep   Sterile Tech: cap, gloves and sterile barriers  Prep: ChloraPrep  Patient monitoring: EKG, continuous pulse oximetry and blood pressure monitoring  Arterial Line Procedure   Laterality:left  Location:  radial artery  Catheter size: 20 G   Guidance: ultrasound guided  PROCEDURE NOTE/ULTRASOUND INTERPRETATION.  Using ultrasound guidance the potential vascular sites for insertion of the catheter were visualized to determine the patency of the vessel to be used for vascular access.  After selecting the appropriate site for insertion, the needle was visualized under ultrasound being inserted into the radial artery, followed by ultrasound confirmation of wire and catheter placement. There were no abnormalities seen on ultrasound; an image was taken; and the patient tolerated the procedure with no complications.   Number of attempts: 1  Successful placement: yes  Post Assessment   Dressing Type: wrist guard applied, secured with tape and occlusive dressing applied.   Complications no  Circ/Move/Sens Assessment: normal and unchanged.   Patient Tolerance: patient tolerated the procedure well with no apparent complications

## 2022-04-30 ENCOUNTER — APPOINTMENT (OUTPATIENT)
Dept: GENERAL RADIOLOGY | Facility: HOSPITAL | Age: 67
End: 2022-04-30

## 2022-04-30 LAB
ALBUMIN SERPL-MCNC: 4.7 G/DL (ref 3.5–5.2)
ANION GAP SERPL CALCULATED.3IONS-SCNC: 13 MMOL/L (ref 5–15)
BASOPHILS # BLD AUTO: 0.02 10*3/MM3 (ref 0–0.2)
BASOPHILS NFR BLD AUTO: 0.2 % (ref 0–1.5)
BUN SERPL-MCNC: 10 MG/DL (ref 8–23)
BUN/CREAT SERPL: 13 (ref 7–25)
CA-I BLD-MCNC: 5.2 MG/DL (ref 4.6–5.4)
CA-I SERPL ISE-MCNC: 1.29 MMOL/L (ref 1.15–1.35)
CALCIUM SPEC-SCNC: 8.8 MG/DL (ref 8.6–10.5)
CHLORIDE SERPL-SCNC: 105 MMOL/L (ref 98–107)
CO2 SERPL-SCNC: 21 MMOL/L (ref 22–29)
CREAT SERPL-MCNC: 0.77 MG/DL (ref 0.57–1)
DEPRECATED RDW RBC AUTO: 45.6 FL (ref 37–54)
EGFRCR SERPLBLD CKD-EPI 2021: 84.7 ML/MIN/1.73
EOSINOPHIL # BLD AUTO: 0.1 10*3/MM3 (ref 0–0.4)
EOSINOPHIL NFR BLD AUTO: 1.2 % (ref 0.3–6.2)
ERYTHROCYTE [DISTWIDTH] IN BLOOD BY AUTOMATED COUNT: 14.1 % (ref 12.3–15.4)
GLUCOSE BLDC GLUCOMTR-MCNC: 121 MG/DL (ref 70–130)
GLUCOSE BLDC GLUCOMTR-MCNC: 123 MG/DL (ref 70–130)
GLUCOSE BLDC GLUCOMTR-MCNC: 131 MG/DL (ref 70–130)
GLUCOSE BLDC GLUCOMTR-MCNC: 134 MG/DL (ref 70–130)
GLUCOSE BLDC GLUCOMTR-MCNC: 143 MG/DL (ref 70–130)
GLUCOSE BLDC GLUCOMTR-MCNC: 157 MG/DL (ref 70–130)
GLUCOSE BLDC GLUCOMTR-MCNC: 158 MG/DL (ref 70–130)
GLUCOSE BLDC GLUCOMTR-MCNC: 178 MG/DL (ref 70–130)
GLUCOSE SERPL-MCNC: 120 MG/DL (ref 65–99)
HCT VFR BLD AUTO: 34.4 % (ref 34–46.6)
HGB BLD-MCNC: 11.2 G/DL (ref 12–15.9)
IMM GRANULOCYTES # BLD AUTO: 0.03 10*3/MM3 (ref 0–0.05)
IMM GRANULOCYTES NFR BLD AUTO: 0.4 % (ref 0–0.5)
INR PPP: 1.15 (ref 0.9–1.1)
LYMPHOCYTES # BLD AUTO: 0.87 10*3/MM3 (ref 0.7–3.1)
LYMPHOCYTES NFR BLD AUTO: 10.4 % (ref 19.6–45.3)
MAGNESIUM SERPL-MCNC: 2.7 MG/DL (ref 1.6–2.4)
MCH RBC QN AUTO: 28.7 PG (ref 26.6–33)
MCHC RBC AUTO-ENTMCNC: 32.6 G/DL (ref 31.5–35.7)
MCV RBC AUTO: 88.2 FL (ref 79–97)
MONOCYTES # BLD AUTO: 0.5 10*3/MM3 (ref 0.1–0.9)
MONOCYTES NFR BLD AUTO: 6 % (ref 5–12)
NEUTROPHILS NFR BLD AUTO: 6.83 10*3/MM3 (ref 1.7–7)
NEUTROPHILS NFR BLD AUTO: 81.8 % (ref 42.7–76)
NRBC BLD AUTO-RTO: 0 /100 WBC (ref 0–0.2)
PHOSPHATE SERPL-MCNC: 3.7 MG/DL (ref 2.5–4.5)
PLATELET # BLD AUTO: 171 10*3/MM3 (ref 140–450)
PMV BLD AUTO: 10.8 FL (ref 6–12)
POTASSIUM SERPL-SCNC: 4.3 MMOL/L (ref 3.5–5.2)
PROTHROMBIN TIME: 14.6 SECONDS (ref 11.7–14.2)
QT INTERVAL: 506 MS
RBC # BLD AUTO: 3.9 10*6/MM3 (ref 3.77–5.28)
SODIUM SERPL-SCNC: 139 MMOL/L (ref 136–145)
WBC NRBC COR # BLD: 8.35 10*3/MM3 (ref 3.4–10.8)

## 2022-04-30 PROCEDURE — P9041 ALBUMIN (HUMAN),5%, 50ML: HCPCS | Performed by: NURSE PRACTITIONER

## 2022-04-30 PROCEDURE — 93005 ELECTROCARDIOGRAM TRACING: CPT | Performed by: NURSE PRACTITIONER

## 2022-04-30 PROCEDURE — 83735 ASSAY OF MAGNESIUM: CPT | Performed by: NURSE PRACTITIONER

## 2022-04-30 PROCEDURE — 25010000002 FUROSEMIDE PER 20 MG: Performed by: THORACIC SURGERY (CARDIOTHORACIC VASCULAR SURGERY)

## 2022-04-30 PROCEDURE — 82330 ASSAY OF CALCIUM: CPT | Performed by: NURSE PRACTITIONER

## 2022-04-30 PROCEDURE — 82962 GLUCOSE BLOOD TEST: CPT

## 2022-04-30 PROCEDURE — 85025 COMPLETE CBC W/AUTO DIFF WBC: CPT | Performed by: NURSE PRACTITIONER

## 2022-04-30 PROCEDURE — 25010000002 ALBUMIN HUMAN 5% PER 50 ML: Performed by: NURSE PRACTITIONER

## 2022-04-30 PROCEDURE — 25010000002 METOCLOPRAMIDE PER 10 MG: Performed by: NURSE PRACTITIONER

## 2022-04-30 PROCEDURE — 85610 PROTHROMBIN TIME: CPT | Performed by: NURSE PRACTITIONER

## 2022-04-30 PROCEDURE — 71045 X-RAY EXAM CHEST 1 VIEW: CPT

## 2022-04-30 PROCEDURE — 97116 GAIT TRAINING THERAPY: CPT

## 2022-04-30 PROCEDURE — 97162 PT EVAL MOD COMPLEX 30 MIN: CPT

## 2022-04-30 PROCEDURE — 25010000002 ENOXAPARIN PER 10 MG: Performed by: NURSE PRACTITIONER

## 2022-04-30 PROCEDURE — 93010 ELECTROCARDIOGRAM REPORT: CPT | Performed by: INTERNAL MEDICINE

## 2022-04-30 PROCEDURE — 99232 SBSQ HOSP IP/OBS MODERATE 35: CPT | Performed by: INTERNAL MEDICINE

## 2022-04-30 PROCEDURE — 63710000001 INSULIN LISPRO (HUMAN) PER 5 UNITS: Performed by: THORACIC SURGERY (CARDIOTHORACIC VASCULAR SURGERY)

## 2022-04-30 PROCEDURE — 99024 POSTOP FOLLOW-UP VISIT: CPT | Performed by: THORACIC SURGERY (CARDIOTHORACIC VASCULAR SURGERY)

## 2022-04-30 PROCEDURE — 25010000002 CEFAZOLIN IN DEXTROSE 2-4 GM/100ML-% SOLUTION: Performed by: NURSE PRACTITIONER

## 2022-04-30 PROCEDURE — 80069 RENAL FUNCTION PANEL: CPT | Performed by: NURSE PRACTITIONER

## 2022-04-30 RX ORDER — NICOTINE POLACRILEX 4 MG
15 LOZENGE BUCCAL
Status: DISCONTINUED | OUTPATIENT
Start: 2022-04-30 | End: 2022-05-04 | Stop reason: HOSPADM

## 2022-04-30 RX ORDER — INSULIN LISPRO 100 [IU]/ML
0-9 INJECTION, SOLUTION INTRAVENOUS; SUBCUTANEOUS
Status: DISCONTINUED | OUTPATIENT
Start: 2022-04-30 | End: 2022-04-30

## 2022-04-30 RX ORDER — PANTOPRAZOLE SODIUM 40 MG/1
40 TABLET, DELAYED RELEASE ORAL
Status: DISCONTINUED | OUTPATIENT
Start: 2022-05-01 | End: 2022-05-04 | Stop reason: HOSPADM

## 2022-04-30 RX ORDER — METFORMIN HYDROCHLORIDE 500 MG/1
500 TABLET, EXTENDED RELEASE ORAL
Status: ON HOLD | COMMUNITY
Start: 2022-02-14 | End: 2022-05-04 | Stop reason: SDUPTHER

## 2022-04-30 RX ORDER — INSULIN LISPRO 100 [IU]/ML
0-9 INJECTION, SOLUTION INTRAVENOUS; SUBCUTANEOUS
Status: DISCONTINUED | OUTPATIENT
Start: 2022-04-30 | End: 2022-05-04 | Stop reason: HOSPADM

## 2022-04-30 RX ORDER — FUROSEMIDE 10 MG/ML
40 INJECTION INTRAMUSCULAR; INTRAVENOUS ONCE
Status: COMPLETED | OUTPATIENT
Start: 2022-04-30 | End: 2022-04-30

## 2022-04-30 RX ORDER — CHLORHEXIDINE GLUCONATE 0.12 MG/ML
15 RINSE ORAL EVERY 12 HOURS
Status: COMPLETED | OUTPATIENT
Start: 2022-04-30 | End: 2022-05-01

## 2022-04-30 RX ORDER — DEXTROSE MONOHYDRATE 25 G/50ML
25 INJECTION, SOLUTION INTRAVENOUS
Status: DISCONTINUED | OUTPATIENT
Start: 2022-04-30 | End: 2022-05-04 | Stop reason: HOSPADM

## 2022-04-30 RX ORDER — POTASSIUM CHLORIDE 750 MG/1
20 TABLET, FILM COATED, EXTENDED RELEASE ORAL ONCE
Status: COMPLETED | OUTPATIENT
Start: 2022-04-30 | End: 2022-04-30

## 2022-04-30 RX ADMIN — OXYCODONE 10 MG: 5 TABLET ORAL at 09:29

## 2022-04-30 RX ADMIN — ACETAMINOPHEN 650 MG: 325 TABLET ORAL at 03:48

## 2022-04-30 RX ADMIN — CEFAZOLIN SODIUM 2 G: 2 INJECTION, SOLUTION INTRAVENOUS at 21:18

## 2022-04-30 RX ADMIN — FUROSEMIDE 40 MG: 10 INJECTION, SOLUTION INTRAVENOUS at 09:52

## 2022-04-30 RX ADMIN — CEFAZOLIN SODIUM 2 G: 2 INJECTION, SOLUTION INTRAVENOUS at 06:07

## 2022-04-30 RX ADMIN — OXYCODONE 10 MG: 5 TABLET ORAL at 21:18

## 2022-04-30 RX ADMIN — ENOXAPARIN SODIUM 40 MG: 100 INJECTION SUBCUTANEOUS at 17:07

## 2022-04-30 RX ADMIN — MUPIROCIN 1 APPLICATION: 20 OINTMENT TOPICAL at 21:18

## 2022-04-30 RX ADMIN — MUPIROCIN 1 APPLICATION: 20 OINTMENT TOPICAL at 08:34

## 2022-04-30 RX ADMIN — METOPROLOL TARTRATE 12.5 MG: 25 TABLET, FILM COATED ORAL at 08:34

## 2022-04-30 RX ADMIN — OXYCODONE 10 MG: 5 TABLET ORAL at 13:01

## 2022-04-30 RX ADMIN — POTASSIUM CHLORIDE 20 MEQ: 10 TABLET, EXTENDED RELEASE ORAL at 09:51

## 2022-04-30 RX ADMIN — ATORVASTATIN CALCIUM 40 MG: 20 TABLET, FILM COATED ORAL at 21:17

## 2022-04-30 RX ADMIN — DOCUSATE SODIUM 50MG AND SENNOSIDES 8.6MG 2 TABLET: 8.6; 5 TABLET, FILM COATED ORAL at 21:17

## 2022-04-30 RX ADMIN — INSULIN LISPRO 2 UNITS: 100 INJECTION, SOLUTION INTRAVENOUS; SUBCUTANEOUS at 12:03

## 2022-04-30 RX ADMIN — HYDROCODONE BITARTRATE AND ACETAMINOPHEN 2 TABLET: 5; 325 TABLET ORAL at 05:14

## 2022-04-30 RX ADMIN — CHLORHEXIDINE GLUCONATE 15 ML: 1.2 RINSE ORAL at 21:17

## 2022-04-30 RX ADMIN — ACETAMINOPHEN 650 MG: 325 TABLET ORAL at 21:18

## 2022-04-30 RX ADMIN — PANTOPRAZOLE SODIUM 40 MG: 40 TABLET, DELAYED RELEASE ORAL at 07:34

## 2022-04-30 RX ADMIN — CYCLOBENZAPRINE 10 MG: 10 TABLET, FILM COATED ORAL at 07:48

## 2022-04-30 RX ADMIN — ALBUMIN HUMAN 250 ML: 0.05 INJECTION, SOLUTION INTRAVENOUS at 00:16

## 2022-04-30 RX ADMIN — CEFAZOLIN SODIUM 2 G: 2 INJECTION, SOLUTION INTRAVENOUS at 12:58

## 2022-04-30 RX ADMIN — METOPROLOL TARTRATE 12.5 MG: 25 TABLET, FILM COATED ORAL at 21:17

## 2022-04-30 RX ADMIN — ALBUMIN HUMAN 250 ML: 0.05 INJECTION, SOLUTION INTRAVENOUS at 02:08

## 2022-04-30 RX ADMIN — CHLORHEXIDINE GLUCONATE 15 ML: 1.2 RINSE ORAL at 08:34

## 2022-04-30 RX ADMIN — ASPIRIN 81 MG: 81 TABLET, COATED ORAL at 08:33

## 2022-04-30 RX ADMIN — CHLORHEXIDINE GLUCONATE 15 ML: 1.2 RINSE ORAL at 03:48

## 2022-04-30 RX ADMIN — METOCLOPRAMIDE HYDROCHLORIDE 10 MG: 5 INJECTION INTRAMUSCULAR; INTRAVENOUS at 09:17

## 2022-04-30 RX ADMIN — HYDROCODONE BITARTRATE AND ACETAMINOPHEN 2 TABLET: 5; 325 TABLET ORAL at 17:07

## 2022-04-30 RX ADMIN — INSULIN LISPRO 2 UNITS: 100 INJECTION, SOLUTION INTRAVENOUS; SUBCUTANEOUS at 09:51

## 2022-04-30 RX ADMIN — METOCLOPRAMIDE HYDROCHLORIDE 10 MG: 5 INJECTION INTRAMUSCULAR; INTRAVENOUS at 03:48

## 2022-05-01 ENCOUNTER — APPOINTMENT (OUTPATIENT)
Dept: GENERAL RADIOLOGY | Facility: HOSPITAL | Age: 67
End: 2022-05-01

## 2022-05-01 LAB
ANION GAP SERPL CALCULATED.3IONS-SCNC: 12 MMOL/L (ref 5–15)
BUN SERPL-MCNC: 11 MG/DL (ref 8–23)
BUN/CREAT SERPL: 16.2 (ref 7–25)
CALCIUM SPEC-SCNC: 8.9 MG/DL (ref 8.6–10.5)
CHLORIDE SERPL-SCNC: 101 MMOL/L (ref 98–107)
CO2 SERPL-SCNC: 24 MMOL/L (ref 22–29)
CREAT SERPL-MCNC: 0.68 MG/DL (ref 0.57–1)
DEPRECATED RDW RBC AUTO: 47.5 FL (ref 37–54)
EGFRCR SERPLBLD CKD-EPI 2021: 95.6 ML/MIN/1.73
ERYTHROCYTE [DISTWIDTH] IN BLOOD BY AUTOMATED COUNT: 14.3 % (ref 12.3–15.4)
GLUCOSE BLDC GLUCOMTR-MCNC: 140 MG/DL (ref 70–130)
GLUCOSE BLDC GLUCOMTR-MCNC: 170 MG/DL (ref 70–130)
GLUCOSE BLDC GLUCOMTR-MCNC: 194 MG/DL (ref 70–130)
GLUCOSE BLDC GLUCOMTR-MCNC: 250 MG/DL (ref 70–130)
GLUCOSE SERPL-MCNC: 143 MG/DL (ref 65–99)
HCT VFR BLD AUTO: 36.3 % (ref 34–46.6)
HGB BLD-MCNC: 11.6 G/DL (ref 12–15.9)
MCH RBC QN AUTO: 28.8 PG (ref 26.6–33)
MCHC RBC AUTO-ENTMCNC: 32 G/DL (ref 31.5–35.7)
MCV RBC AUTO: 90.1 FL (ref 79–97)
PLATELET # BLD AUTO: 204 10*3/MM3 (ref 140–450)
PMV BLD AUTO: 10.5 FL (ref 6–12)
POTASSIUM SERPL-SCNC: 4 MMOL/L (ref 3.5–5.2)
QT INTERVAL: 398 MS
RBC # BLD AUTO: 4.03 10*6/MM3 (ref 3.77–5.28)
SODIUM SERPL-SCNC: 137 MMOL/L (ref 136–145)
WBC NRBC COR # BLD: 11.26 10*3/MM3 (ref 3.4–10.8)

## 2022-05-01 PROCEDURE — 25010000002 CEFAZOLIN IN DEXTROSE 2-4 GM/100ML-% SOLUTION: Performed by: NURSE PRACTITIONER

## 2022-05-01 PROCEDURE — 71045 X-RAY EXAM CHEST 1 VIEW: CPT

## 2022-05-01 PROCEDURE — 25010000002 FUROSEMIDE PER 20 MG: Performed by: THORACIC SURGERY (CARDIOTHORACIC VASCULAR SURGERY)

## 2022-05-01 PROCEDURE — 85027 COMPLETE CBC AUTOMATED: CPT | Performed by: NURSE PRACTITIONER

## 2022-05-01 PROCEDURE — 25010000002 ENOXAPARIN PER 10 MG: Performed by: NURSE PRACTITIONER

## 2022-05-01 PROCEDURE — 82962 GLUCOSE BLOOD TEST: CPT

## 2022-05-01 PROCEDURE — 99024 POSTOP FOLLOW-UP VISIT: CPT | Performed by: THORACIC SURGERY (CARDIOTHORACIC VASCULAR SURGERY)

## 2022-05-01 PROCEDURE — 63710000001 INSULIN LISPRO (HUMAN) PER 5 UNITS

## 2022-05-01 PROCEDURE — 93010 ELECTROCARDIOGRAM REPORT: CPT | Performed by: INTERNAL MEDICINE

## 2022-05-01 PROCEDURE — 97116 GAIT TRAINING THERAPY: CPT

## 2022-05-01 PROCEDURE — 99232 SBSQ HOSP IP/OBS MODERATE 35: CPT | Performed by: INTERNAL MEDICINE

## 2022-05-01 PROCEDURE — 93005 ELECTROCARDIOGRAM TRACING: CPT | Performed by: NURSE PRACTITIONER

## 2022-05-01 PROCEDURE — 80048 BASIC METABOLIC PNL TOTAL CA: CPT | Performed by: NURSE PRACTITIONER

## 2022-05-01 RX ORDER — FUROSEMIDE 10 MG/ML
40 INJECTION INTRAMUSCULAR; INTRAVENOUS ONCE
Status: COMPLETED | OUTPATIENT
Start: 2022-05-01 | End: 2022-05-01

## 2022-05-01 RX ADMIN — INSULIN LISPRO 6 UNITS: 100 INJECTION, SOLUTION INTRAVENOUS; SUBCUTANEOUS at 20:32

## 2022-05-01 RX ADMIN — CEFAZOLIN SODIUM 2 G: 2 INJECTION, SOLUTION INTRAVENOUS at 05:30

## 2022-05-01 RX ADMIN — CHLORHEXIDINE GLUCONATE 15 ML: 1.2 RINSE ORAL at 20:32

## 2022-05-01 RX ADMIN — HYDROCODONE BITARTRATE AND ACETAMINOPHEN 2 TABLET: 5; 325 TABLET ORAL at 22:33

## 2022-05-01 RX ADMIN — MUPIROCIN 1 APPLICATION: 20 OINTMENT TOPICAL at 20:31

## 2022-05-01 RX ADMIN — MICONAZOLE NITRATE 1 APPLICATION: 2 CREAM TOPICAL at 16:22

## 2022-05-01 RX ADMIN — METOPROLOL TARTRATE 25 MG: 25 TABLET, FILM COATED ORAL at 20:31

## 2022-05-01 RX ADMIN — HYDROCODONE BITARTRATE AND ACETAMINOPHEN 2 TABLET: 5; 325 TABLET ORAL at 13:20

## 2022-05-01 RX ADMIN — METOPROLOL TARTRATE 12.5 MG: 25 TABLET, FILM COATED ORAL at 09:07

## 2022-05-01 RX ADMIN — INSULIN LISPRO 2 UNITS: 100 INJECTION, SOLUTION INTRAVENOUS; SUBCUTANEOUS at 07:25

## 2022-05-01 RX ADMIN — DOCUSATE SODIUM 50MG AND SENNOSIDES 8.6MG 2 TABLET: 8.6; 5 TABLET, FILM COATED ORAL at 20:31

## 2022-05-01 RX ADMIN — FUROSEMIDE 40 MG: 10 INJECTION, SOLUTION INTRAMUSCULAR; INTRAVENOUS at 09:07

## 2022-05-01 RX ADMIN — PANTOPRAZOLE SODIUM 40 MG: 40 TABLET, DELAYED RELEASE ORAL at 05:30

## 2022-05-01 RX ADMIN — HYDROCODONE BITARTRATE AND ACETAMINOPHEN 2 TABLET: 5; 325 TABLET ORAL at 18:16

## 2022-05-01 RX ADMIN — METOPROLOL TARTRATE 12.5 MG: 25 TABLET, FILM COATED ORAL at 08:17

## 2022-05-01 RX ADMIN — ENOXAPARIN SODIUM 40 MG: 100 INJECTION SUBCUTANEOUS at 08:13

## 2022-05-01 RX ADMIN — CHLORHEXIDINE GLUCONATE 15 ML: 1.2 RINSE ORAL at 08:18

## 2022-05-01 RX ADMIN — ASPIRIN 81 MG: 81 TABLET, COATED ORAL at 08:18

## 2022-05-01 RX ADMIN — MICONAZOLE NITRATE 1 APPLICATION: 2 CREAM TOPICAL at 20:32

## 2022-05-01 RX ADMIN — ATORVASTATIN CALCIUM 40 MG: 20 TABLET, FILM COATED ORAL at 20:31

## 2022-05-01 RX ADMIN — HYDROCODONE BITARTRATE AND ACETAMINOPHEN 2 TABLET: 5; 325 TABLET ORAL at 07:25

## 2022-05-01 RX ADMIN — INSULIN LISPRO 2 UNITS: 100 INJECTION, SOLUTION INTRAVENOUS; SUBCUTANEOUS at 11:53

## 2022-05-01 RX ADMIN — HYDROCODONE BITARTRATE AND ACETAMINOPHEN 2 TABLET: 5; 325 TABLET ORAL at 03:07

## 2022-05-01 RX ADMIN — MUPIROCIN 1 APPLICATION: 20 OINTMENT TOPICAL at 08:17

## 2022-05-01 NOTE — PLAN OF CARE
Goal Outcome Evaluation:  Plan of Care Reviewed With: patient        Progress: improving  Outcome Evaluation: Pt agreeable to PT this AM and able to progress gait distance. Pt UIC and requiring min A x2 for STS with cues to lean forward and not push through UEs. Pt ambulated 100ft with min A x2 for assist with equipment and HHA x2. Pt demonstrated overall improved steadiness and safety with gait - limited by fatigue. Pt assisted back to bed with mod A x2 - cues to not hold her breath and assist with directing trunk/lifting BLE back into bed. Pt will continue to benefit from skilled PT to address functional deficits and improve overall mobility. PT recommending D/C home with HHPT.

## 2022-05-01 NOTE — PLAN OF CARE
Goal Outcome Evaluation:  Plan of Care Reviewed With: patient        Progress: improving      Patient transferred to CVU. Vitals stable. Increased lopressor dose today. Great response to lasix 40mg IV - greater than 1.2L. Hanson removed and successfully voided. Central line and chest tubes removed w/out complication. A/Ox4. Walked 75ft in velasquez w/ RN and PT. Norco for pain. Daughter updated via phone.

## 2022-05-01 NOTE — PROGRESS NOTES
"CC: Coronary artery disease    Interval History: No new acute events overnight      Vital Signs  Temp:  [98.9 °F (37.2 °C)-100.4 °F (38 °C)] 100.2 °F (37.9 °C)  Heart Rate:  [74-93] 88  Resp:  [24-28] 28  BP: (118-153)/(54-95) 124/82    Intake/Output Summary (Last 24 hours) at 5/1/2022 1527  Last data filed at 5/1/2022 1200  Gross per 24 hour   Intake 1056 ml   Output 2455 ml   Net -1399 ml     Flowsheet Rows    Flowsheet Row First Filed Value   Admission Height 157.5 cm (62\") Documented at 04/28/2022 1335   Admission Weight 119 kg (262 lb) Documented at 04/28/2022 1335          PHYSICAL EXAM:  General: No acute distress, sitting on recliner  Resp:NL Rate, symmetric chest expansion,unlabored, clear  CV:NL rate and rhythm, NL PMI, NL S1 and S2, no Murmur, no gallop, no rub, No JVD.   ABD:Nl sounds, no masses or tenderness, nondistended, no guarding or rebound  Neuro: alert,cooperative and oriented  Extr:Normal pedal pulses, No edema or cyanosis, moves all extremities      Results Review:    Results from last 7 days   Lab Units 05/01/22  0308   SODIUM mmol/L 137   POTASSIUM mmol/L 4.0   CHLORIDE mmol/L 101   CO2 mmol/L 24.0   BUN mg/dL 11   CREATININE mg/dL 0.68   GLUCOSE mg/dL 143*   CALCIUM mg/dL 8.9     Results from last 7 days   Lab Units 04/28/22  1017   TROPONIN T ng/mL <0.010     Results from last 7 days   Lab Units 05/01/22  0308   WBC 10*3/mm3 11.26*   HEMOGLOBIN g/dL 11.6*   HEMATOCRIT % 36.3   PLATELETS 10*3/mm3 204     Results from last 7 days   Lab Units 04/30/22  0310 04/29/22  1523 04/28/22  2220   INR  1.15* 1.23* 0.95   APTT seconds  --  31.4 31.9     Results from last 7 days   Lab Units 04/29/22  0254   CHOLESTEROL mg/dL 194     Results from last 7 days   Lab Units 04/30/22  0310   MAGNESIUM mg/dL 2.7*     Results from last 7 days   Lab Units 04/29/22  0254   CHOLESTEROL mg/dL 194   TRIGLYCERIDES mg/dL 253*   HDL CHOL mg/dL 33*   LDL CHOL mg/dL 117*     I reviewed the patient's new clinical " results.  I personally viewed and interpreted the patient's EKG/Telemetry data        Medication Review:   Meds reviewed         Assessment/Plan      1.  Multivessel coronary artery status post CABG x3: LIMA to LAD, SVG to OM1, SVG to OM 2 on 4/29/2022  No acute events overnight  2.  Type 2 diabetes mellitus  3.  Hypercholesterolemia  4.  Essential hypertension  5.  Morbidly obese with obstructive sleep apnea     Hemodynamically stable and  no acute events overnight  Chest and mediastinal tube removed  Postop care per CV surgery  Aspirin,atorvastatin and metoprolol resumed.  Cardiology will follow along.      Jon Guerrier MD  05/01/22  15:27 EDT

## 2022-05-01 NOTE — THERAPY TREATMENT NOTE
Patient Name: Scarlett Ray  : 1955    MRN: [id number]                              Today's Date: 2022       Admit Date: 2022    Visit Dx:     ICD-10-CM ICD-9-CM   1. Coronary artery disease involving native coronary artery of native heart with unstable angina pectoris (HCC)  I25.110 414.01     411.1   2. Chest pain, unspecified type  R07.9 786.50   3. Abnormal findings on diagnostic imaging of heart and coronary circulation  R93.1 794.39     Patient Active Problem List   Diagnosis   • Obstructive sleep apnea, adult   • Essential hypertension, malignant   • Cor pulmonale (HCC)   • Chest pain   • Chest pain, unspecified type   • Coronary artery disease involving native coronary artery of native heart with unstable angina pectoris (HCC)   • Abnormal findings on diagnostic imaging of heart and coronary circulation     Past Medical History:   Diagnosis Date   • Asthma    • Elevated cholesterol    • H/O seasonal allergies    • HLD (hyperlipidemia)    • Hypertension    • Obesity    • NONI (obstructive sleep apnea)     compliant w/ CPAP   • Type 2 diabetes mellitus (HCC)     on metformin at home     Past Surgical History:   Procedure Laterality Date   • BREAST BIOPSY Left    • CARDIAC CATHETERIZATION N/A 2022    Procedure: Left Heart Cath;  Surgeon: Ada Martinez MD;  Location:  [facility];  Service: Cardiology;  Laterality: N/A;   • CARDIAC CATHETERIZATION N/A 2022    Procedure: Left ventriculography;  Surgeon: Ada Martinez MD;  Location:  [facility];  Service: Cardiology;  Laterality: N/A;   • CARDIAC CATHETERIZATION N/A 2022    Procedure: Coronary angiography;  Surgeon: Ada Martinez MD;  Location:  [facility];  Service: Cardiology;  Laterality: N/A;   •  SECTION     • DILATATION AND CURETTAGE      X2   • UMBILICAL HERNIA REPAIR        General Information     Row Name 22 1218          Physical Therapy Time and  Intention    Document Type therapy note (daily note)  -     Mode of Treatment physical therapy;individual therapy  -     Row Name 05/01/22 1218          General Information    Patient Profile Reviewed yes  -     Existing Precautions/Restrictions cardiac;sternal;fall;oxygen therapy device and L/min  -     Row Name 05/01/22 1218          Cognition    Orientation Status (Cognition) oriented x 4  -     Row Name 05/01/22 1218          Safety Issues, Functional Mobility    Impairments Affecting Function (Mobility) balance;endurance/activity tolerance;shortness of breath;strength;pain  -           User Key  (r) = Recorded By, (t) = Taken By, (c) = Cosigned By    Initials Name Provider Type     Tova Serna PT Physical Therapist               Mobility     Row Name 05/01/22 1219          Bed Mobility    Bed Mobility sit-supine;supine-sit  -     Supine-Sit [place] (Bed Mobility) not tested  [facility]  -     Sit-Supine [place] (Bed Mobility) moderate assist (50% patient effort);2 person assist;nonverbal cues (demo/gesture);verbal cues  -     Assistive Device (Bed Mobility) bed rails  -     Row Name 05/01/22 1219          Sit-Stand Transfer    Sit-Stand [place] (Transfers) minimum assist (75% patient effort);2 person assist;verbal cues  -     Assistive Device (Sit-Stand Transfers) other (see comments)  A x2  -     Row Name 05/01/22 1219          Gait/Stairs (Locomotion)    [place] Level (Gait) verbal cues;minimum assist (75% patient effort);1 person to manage equipment;1 person assist  -     Assistive Device (Gait) other (see comments)  A x2  -     Distance in Feet (Gait) 100ft  -     Deviations/Abnormal Patterns (Gait) nafisa decreased;gait speed decreased;stride length decreased;weight shifting decreased;festinating/shuffling  -     Bilateral Gait Deviations forward flexed posture;heel strike decreased  -     Comment, (Gait/Stairs) Progressed gait distance - limied  2/2 fatigue, improved overall steadiness  -           User Key  (r) = Recorded By, (t) = Taken By, (c) = Cosigned By    Initials Name Provider Type     Tova Serna PT Physical Therapist               Obj/Interventions     Row Name 05/01/22 1220          Motor Skills    Therapeutic Exercise --  10 reps cardiac rehab protocol  -           User Key  (r) = Recorded By, (t) = Taken By, (c) = Cosigned By    Initials Name Provider Type     Tova Serna PT Physical Therapist               Goals/Plan    No documentation.                Clinical Impression     Row Name 05/01/22 1221          Pain    Pretreatment Pain Rating 0/10 - no pain  -     Posttreatment Pain Rating 0/10 - no pain  -     Row Name 05/01/22 1221          Plan of Care Review    Plan of Care Reviewed With patient  -     Progress improving  -     Outcome Evaluation Pt agreeable to PT this AM and able to progress gait distance. Pt UIC and requiring min A x2 for STS with cues to lean forward and not push through UEs. Pt ambulated 100ft with min A x2 for assist with equipment and HHA x2. Pt demonstrated overall improved steadiness and safety with gait - limited by fatigue. Pt assisted back to bed with mod A x2 - cues to not hold her breath and assist with directing trunk/lifting BLE back into bed. Pt will continue to benefit from skilled PT to address functional deficits and improve overall mobility. PT recommending D/C home with HHPT.  -[facility] Name 05/01/22 1221          Vital Signs    O2 Delivery Pre Treatment supplemental O2  -     O2 Delivery Intra Treatment supplemental O2  -     O2 Delivery Post Treatment supplemental O2  -[facility] Name 05/01/22 1221          Positioning and Restraints    Pre-Treatment Position sitting in chair/recliner  -     Post Treatment Position bed  -     In Bed supine;with nsg  -           User Key  (r) = Recorded By, (t) = Taken By, (c) = Cosigned By    Initials Name Provider Type      Tova Serna PT Physical Therapist               Outcome Measures     Row Name 05/01/22 1224          How much help from another person do you currently need...    Turning from your back to your side while in flat bed without using bedrails? 3  -BH     Moving from lying on back to sitting on the side of a flat bed without bedrails? 3  -BH     Moving to and from a bed to a chair (including a wheelchair)? 3  -BH     Standing up from a chair using your arms (e.g., wheelchair, bedside chair)? 3  -BH     Climbing 3-5 steps with a railing? 2  -BH     To walk in hospital room? 3  -BH     AM-PAC 6 Clicks Score (PT) 17  -BH     Highest level of mobility 5 --> Static standing  -     Row Name 05/01/22 1224          Functional Assessment    Outcome Measure Options AM-PAC 6 Clicks Basic Mobility (PT)  -           User Key  (r) = Recorded By, (t) = Taken By, (c) = Cosigned By    Initials Name Provider Type     Tova Serna PT Physical Therapist                             Physical Therapy Education                 Title: PT OT SLP Therapies (Done)     Topic: Physical Therapy (Done)     Point: Mobility training (Done)     Learning Progress Summary           Patient Acceptance, E,TB,D, VU,NR by  at 5/1/2022 1224    Acceptance, E,TB,D, VU,NR by  at 4/30/2022 1211                   Point: Home exercise program (Done)     Learning Progress Summary           Patient Acceptance, E,TB,D, VU,NR by  at 5/1/2022 1224    Acceptance, E,TB,D, VU,NR by  at 4/30/2022 1211                   Point: Body mechanics (Done)     Learning Progress Summary           Patient Acceptance, E,TB,D, VU,NR by  at 5/1/2022 1224    Acceptance, E,TB,D, VU,NR by  at 4/30/2022 1211                   Point: Precautions (Done)     Learning Progress Summary           Patient Acceptance, E,TB,D, VU,NR by  at 5/1/2022 1224    Acceptance, E,TB,D, VU,NR by  at 4/30/2022 1211                               User Key     Initials Effective Dates  Name Provider Type Discipline     04/08/22 -  Tova Serna PT Physical Therapist PT              PT Recommendation and Plan  Planned Therapy Interventions (PT): balance training, bed mobility training, gait training, home exercise program, patient/family education, stair training, strengthening, transfer training  Plan of Care Reviewed With: patient  Progress: improving  Outcome Evaluation: Pt agreeable to PT this AM and able to progress gait distance. Pt UIC and requiring min A x2 for STS with cues to lean forward and not push through UEs. Pt ambulated 100ft with min A x2 for assist with equipment and HHA x2. Pt demonstrated overall improved steadiness and safety with gait - limited by fatigue. Pt assisted back to bed with mod A x2 - cues to not hold her breath and assist with directing trunk/lifting BLE back into bed. Pt will continue to benefit from skilled PT to address functional deficits and improve overall mobility. PT recommending D/C home with HHPT.     Time Calculation:    PT Charges     Row Name 05/01/22 1225             Time Calculation    Start Time 0911  -      Stop Time 0921  -      Time Calculation (min) 10 min  -      PT Received On 05/01/22  -      PT - Next Appointment 05/02/22  -              Time Calculation- PT    Total Timed Code Minutes- PT 10 minute(s)  -              Timed Charges    25990 - PT Therapeutic Exercise Minutes 2  -      87751 - Gait Training Minutes  8  -              Total Minutes    Timed Charges Total Minutes 10  -       Total Minutes 10  -            User Key  (r) = Recorded By, (t) = Taken By, (c) = Cosigned By    Initials Name Provider Type     Tova Serna PT Physical Therapist              Therapy Charges for Today     Code Description Service Date Service Provider Modifiers Qty    58867622890 HC GAIT TRAINING EA 15 MIN 4/30/2022 Tova Serna, PT GP 1    09027493733 HC PT EVAL MOD COMPLEXITY 2 4/30/2022 Tova Serna, PT GP 1     [id number]  PT THER SUPP EA 15 MIN 4/30/2022 Tova Serna, PT GP 1    [id number]  GAIT TRAINING EA 15 MIN 5/1/2022 Tova Serna, PT GP 1    [id number]  PT THER SUPP EA 15 MIN 5/1/2022 Tova Serna, PT GP 1          PT G-Codes  Outcome Measure Options: AM-PAC 6 Clicks Basic Mobility (PT)  AM-PAC 6 Clicks Score (PT): 17    Tova Serna, PT  5/1/2022

## 2022-05-01 NOTE — PROGRESS NOTES
Status post CABG, postop day #2.    CV stable.  Pulmonary toileting.  Mobilize.  Remove chest tubes today.  Oral diet as tolerated.  Nonoliguric.  Remove Hanson catheter today.  We will give an additional dose of Lasix this morning.  Afebrile.    Waiting for bed in CVU.

## 2022-05-02 ENCOUNTER — APPOINTMENT (OUTPATIENT)
Dept: GENERAL RADIOLOGY | Facility: HOSPITAL | Age: 67
End: 2022-05-02

## 2022-05-02 LAB
ANION GAP SERPL CALCULATED.3IONS-SCNC: 13 MMOL/L (ref 5–15)
BH BB BLOOD EXPIRATION DATE: NORMAL
BH BB BLOOD EXPIRATION DATE: NORMAL
BH BB BLOOD TYPE BARCODE: 9500
BH BB BLOOD TYPE BARCODE: 9500
BH BB DISPENSE STATUS: NORMAL
BH BB DISPENSE STATUS: NORMAL
BH BB PRODUCT CODE: NORMAL
BH BB PRODUCT CODE: NORMAL
BH BB UNIT NUMBER: NORMAL
BH BB UNIT NUMBER: NORMAL
BUN SERPL-MCNC: 18 MG/DL (ref 8–23)
BUN/CREAT SERPL: 26.1 (ref 7–25)
CALCIUM SPEC-SCNC: 9.2 MG/DL (ref 8.6–10.5)
CHLORIDE SERPL-SCNC: 100 MMOL/L (ref 98–107)
CO2 SERPL-SCNC: 24 MMOL/L (ref 22–29)
CREAT SERPL-MCNC: 0.69 MG/DL (ref 0.57–1)
CROSSMATCH INTERPRETATION: NORMAL
CROSSMATCH INTERPRETATION: NORMAL
DEPRECATED RDW RBC AUTO: 44.5 FL (ref 37–54)
EGFRCR SERPLBLD CKD-EPI 2021: 95.3 ML/MIN/1.73
ERYTHROCYTE [DISTWIDTH] IN BLOOD BY AUTOMATED COUNT: 13.8 % (ref 12.3–15.4)
GLUCOSE BLDC GLUCOMTR-MCNC: 156 MG/DL (ref 70–130)
GLUCOSE BLDC GLUCOMTR-MCNC: 166 MG/DL (ref 70–130)
GLUCOSE BLDC GLUCOMTR-MCNC: 200 MG/DL (ref 70–130)
GLUCOSE BLDC GLUCOMTR-MCNC: 207 MG/DL (ref 70–130)
GLUCOSE SERPL-MCNC: 146 MG/DL (ref 65–99)
HCT VFR BLD AUTO: 35.6 % (ref 34–46.6)
HGB BLD-MCNC: 11.9 G/DL (ref 12–15.9)
MCH RBC QN AUTO: 29.5 PG (ref 26.6–33)
MCHC RBC AUTO-ENTMCNC: 33.4 G/DL (ref 31.5–35.7)
MCV RBC AUTO: 88.1 FL (ref 79–97)
PLATELET # BLD AUTO: 212 10*3/MM3 (ref 140–450)
PMV BLD AUTO: 10.9 FL (ref 6–12)
POTASSIUM SERPL-SCNC: 3.8 MMOL/L (ref 3.5–5.2)
RBC # BLD AUTO: 4.04 10*6/MM3 (ref 3.77–5.28)
SODIUM SERPL-SCNC: 137 MMOL/L (ref 136–145)
UNIT  ABO: NORMAL
UNIT  ABO: NORMAL
UNIT  RH: NORMAL
UNIT  RH: NORMAL
WBC NRBC COR # BLD: 10.05 10*3/MM3 (ref 3.4–10.8)

## 2022-05-02 PROCEDURE — 82962 GLUCOSE BLOOD TEST: CPT

## 2022-05-02 PROCEDURE — 85027 COMPLETE CBC AUTOMATED: CPT | Performed by: NURSE PRACTITIONER

## 2022-05-02 PROCEDURE — 99232 SBSQ HOSP IP/OBS MODERATE 35: CPT | Performed by: NURSE PRACTITIONER

## 2022-05-02 PROCEDURE — 94762 N-INVAS EAR/PLS OXIMTRY CONT: CPT

## 2022-05-02 PROCEDURE — 25010000002 ENOXAPARIN PER 10 MG: Performed by: NURSE PRACTITIONER

## 2022-05-02 PROCEDURE — 97116 GAIT TRAINING THERAPY: CPT

## 2022-05-02 PROCEDURE — 80048 BASIC METABOLIC PNL TOTAL CA: CPT | Performed by: NURSE PRACTITIONER

## 2022-05-02 PROCEDURE — 71046 X-RAY EXAM CHEST 2 VIEWS: CPT

## 2022-05-02 PROCEDURE — 63710000001 INSULIN LISPRO (HUMAN) PER 5 UNITS

## 2022-05-02 RX ORDER — POTASSIUM CHLORIDE 750 MG/1
20 TABLET, FILM COATED, EXTENDED RELEASE ORAL DAILY
Status: DISCONTINUED | OUTPATIENT
Start: 2022-05-02 | End: 2022-05-04 | Stop reason: HOSPADM

## 2022-05-02 RX ORDER — FUROSEMIDE 40 MG/1
40 TABLET ORAL DAILY
Status: DISCONTINUED | OUTPATIENT
Start: 2022-05-02 | End: 2022-05-04 | Stop reason: HOSPADM

## 2022-05-02 RX ORDER — NYSTATIN 100000 [USP'U]/G
POWDER TOPICAL EVERY 8 HOURS SCHEDULED
Status: DISCONTINUED | OUTPATIENT
Start: 2022-05-02 | End: 2022-05-04 | Stop reason: HOSPADM

## 2022-05-02 RX ORDER — POTASSIUM CHLORIDE 750 MG/1
40 TABLET, FILM COATED, EXTENDED RELEASE ORAL ONCE
Status: DISCONTINUED | OUTPATIENT
Start: 2022-05-02 | End: 2022-05-02

## 2022-05-02 RX ORDER — POTASSIUM CHLORIDE 750 MG/1
20 TABLET, FILM COATED, EXTENDED RELEASE ORAL ONCE
Status: COMPLETED | OUTPATIENT
Start: 2022-05-02 | End: 2022-05-02

## 2022-05-02 RX ORDER — NITROGLYCERIN 0.4 MG/1
0.4 TABLET SUBLINGUAL
Status: DISCONTINUED | OUTPATIENT
Start: 2022-05-02 | End: 2022-05-04 | Stop reason: HOSPADM

## 2022-05-02 RX ADMIN — MUPIROCIN 1 APPLICATION: 20 OINTMENT TOPICAL at 20:54

## 2022-05-02 RX ADMIN — HYDROCODONE BITARTRATE AND ACETAMINOPHEN 2 TABLET: 5; 325 TABLET ORAL at 06:14

## 2022-05-02 RX ADMIN — DOCUSATE SODIUM 50MG AND SENNOSIDES 8.6MG 2 TABLET: 8.6; 5 TABLET, FILM COATED ORAL at 20:54

## 2022-05-02 RX ADMIN — INSULIN GLARGINE-YFGN 10 UNITS: 100 INJECTION, SOLUTION SUBCUTANEOUS at 21:07

## 2022-05-02 RX ADMIN — ATORVASTATIN CALCIUM 40 MG: 20 TABLET, FILM COATED ORAL at 20:54

## 2022-05-02 RX ADMIN — NYSTATIN: 100000 POWDER TOPICAL at 21:02

## 2022-05-02 RX ADMIN — INSULIN LISPRO 4 UNITS: 100 INJECTION, SOLUTION INTRAVENOUS; SUBCUTANEOUS at 11:15

## 2022-05-02 RX ADMIN — ASPIRIN 81 MG: 81 TABLET, COATED ORAL at 08:10

## 2022-05-02 RX ADMIN — HYDROCODONE BITARTRATE AND ACETAMINOPHEN 2 TABLET: 5; 325 TABLET ORAL at 14:41

## 2022-05-02 RX ADMIN — INSULIN LISPRO 4 UNITS: 100 INJECTION, SOLUTION INTRAVENOUS; SUBCUTANEOUS at 20:55

## 2022-05-02 RX ADMIN — POTASSIUM CHLORIDE 20 MEQ: 10 TABLET, EXTENDED RELEASE ORAL at 08:33

## 2022-05-02 RX ADMIN — INSULIN LISPRO 2 UNITS: 100 INJECTION, SOLUTION INTRAVENOUS; SUBCUTANEOUS at 06:15

## 2022-05-02 RX ADMIN — NYSTATIN: 100000 POWDER TOPICAL at 14:41

## 2022-05-02 RX ADMIN — POTASSIUM CHLORIDE 20 MEQ: 10 TABLET, EXTENDED RELEASE ORAL at 11:15

## 2022-05-02 RX ADMIN — METFORMIN HYDROCHLORIDE 500 MG: 500 TABLET ORAL at 22:34

## 2022-05-02 RX ADMIN — MICONAZOLE NITRATE 1 APPLICATION: 2 CREAM TOPICAL at 22:35

## 2022-05-02 RX ADMIN — MICONAZOLE NITRATE 1 APPLICATION: 2 CREAM TOPICAL at 08:33

## 2022-05-02 RX ADMIN — FUROSEMIDE 40 MG: 40 TABLET ORAL at 08:32

## 2022-05-02 RX ADMIN — METOPROLOL TARTRATE 25 MG: 25 TABLET, FILM COATED ORAL at 20:54

## 2022-05-02 RX ADMIN — MUPIROCIN 1 APPLICATION: 20 OINTMENT TOPICAL at 08:10

## 2022-05-02 RX ADMIN — METOPROLOL TARTRATE 25 MG: 25 TABLET, FILM COATED ORAL at 08:10

## 2022-05-02 RX ADMIN — POLYETHYLENE GLYCOL 3350 17 G: 17 POWDER, FOR SOLUTION ORAL at 08:32

## 2022-05-02 RX ADMIN — ENOXAPARIN SODIUM 40 MG: 100 INJECTION SUBCUTANEOUS at 08:10

## 2022-05-02 RX ADMIN — HYDROCODONE BITARTRATE AND ACETAMINOPHEN 2 TABLET: 5; 325 TABLET ORAL at 23:13

## 2022-05-02 RX ADMIN — INSULIN LISPRO 2 UNITS: 100 INJECTION, SOLUTION INTRAVENOUS; SUBCUTANEOUS at 17:46

## 2022-05-02 RX ADMIN — PANTOPRAZOLE SODIUM 40 MG: 40 TABLET, DELAYED RELEASE ORAL at 06:14

## 2022-05-02 NOTE — PLAN OF CARE
Goal Outcome Evaluation:  Plan of Care Reviewed With: patient, daughter        Progress: improving  Outcome Evaluation: Pt agreeable to PT this AM and continues to progress with independent mobility. Pt UIC and completed STS with SBA. Pt ambulated 150ft with min c/o fatigue towards end of gait. Ambulated on 2L O2 with no c/o SOA. Pt with improved steadiness requiring no HHA or assistance with gait. Pt will continue to benefit from skilled PT to address functional deficits and improve overall mobility. Anticipate D/C home with HHPT.

## 2022-05-02 NOTE — NURSING NOTE
"Diabetes Education  Assessment/Teaching    Patient Name:  Scarlett Ray  YOB: 1955  MRN: [id number]  Admit Date:  4/28/2022      Assessment Date:  5/2/2022  Flowsheet Row Most Recent Value   General Information     Referral From: Database. Meet with 65 y/o at bedside to assess needs for DM ed.    Height 157.5 cm (62\")   Height Method Stated   Weight 114 kg (251 lb 11.2 oz)   Weight Method Standing scale   Diabetes History    What type of diabetes do you have? Type 2   Length of Diabetes Diagnosis -- ~4 yr hx per pt.   Have you had diabetes education/teaching in the past? no   Education Preferences    Barriers to Learning -- no learning barriers assessed at this time.   Assessment Topics    Taking Medication - Assessment Competent -metformin as an outpt. Anticipate pt to dc home back to previous regimen for DM.   Healthy Coping - Assessment Competent   Monitoring - Assessment Competent   DM Goals    Contact Plan Follow-up medical care          Flowsheet Row Most Recent Value   DM Education Needs    Meter Has own   Medication Oral   Reducing Risks A1C testing   Healthy Coping Appropriate   Discharge Plan Follow-up with PCP   Teaching Method Explanation, Discussion   Patient Response Verbalized understanding      Other Comments: No new educational needs id'd by pt at this time.   Electronically signed by:  Shruti Foster RN, BSN  05/02/22 16:08 EDT  "

## 2022-05-02 NOTE — CONSULTS
Met with patient, discussed benefits of cardiac rehab. Provided phase II information along with the contact information for cardiac rehab at [facility].Patient is not sure that she will attend but requested for the referral to be sent.   Explained if receiving home health would not be able to attend cardiac rehab until finished with home health.

## 2022-05-02 NOTE — PROGRESS NOTES
"    Patient Name: Scarlett Ray  :1955  66 y.o.      Patient Care Team:  Jeffry Espino MD as PCP - General  Jeffry Espino MD as PCP - Family Medicine    Chief Complaint: follow up coronary artery disease status post CABG    Interval History: she feels well. She is sitting up in the chair. Moderate pain controlled with pain meds. Mild HASSAN. None at rest.        Objective   Vital Signs  Temp:  [98.6 °F (37 °C)-98.9 °F (37.2 °C)] 98.9 °F (37.2 °C)  Heart Rate:  [] 92  Resp:  [18-22] 18  BP: (109-142)/(73-91) 109/73    Intake/Output Summary (Last 24 hours) at 2022 1256  Last data filed at 2022 0841  Gross per 24 hour   Intake 600 ml   Output 300 ml   Net 300 ml     Flowsheet Rows    Flowsheet Row First Filed Value   Admission Height 157.5 cm (62\") Documented at 2022 1335   Admission Weight 119 kg (262 lb) Documented at 2022 1335          Physical Exam:   General Appearance:    Alert, cooperative, in no acute distress   Lungs:     Clear to auscultation.  Normal respiratory effort and rate.      Heart:    Regular rhythm and normal rate, normal S1 and S2, no murmurs, gallops or rubs.     Chest Wall:    No abnormalities observed   Abdomen:     Soft, nontender, positive bowel sounds.     Extremities:   no cyanosis, clubbing or edema.  No marked joint deformities.  Adequate musculoskeletal strength.       Results Review:    Results from last 7 days   Lab Units 22  0247   SODIUM mmol/L 137   POTASSIUM mmol/L 3.8   CHLORIDE mmol/L 100   CO2 mmol/L 24.0   BUN mg/dL 18   CREATININE mg/dL 0.69   GLUCOSE mg/dL 146*   CALCIUM mg/dL 9.2     Results from last 7 days   Lab Units 22  1017   TROPONIN T ng/mL <0.010     Results from last 7 days   Lab Units 22  0247   WBC 10*3/mm3 10.05   HEMOGLOBIN g/dL 11.9*   HEMATOCRIT % 35.6   PLATELETS 10*3/mm3 212     Results from last 7 days   Lab Units 22  0310 22  1523 22  222   INR  1.15* 1.23* 0.95   APTT seconds  --  " 31.4 31.9     Results from last 7 days   Lab Units 04/30/22  0310   MAGNESIUM mg/dL 2.7*     Results from last 7 days   Lab Units 04/29/22  0254   CHOLESTEROL mg/dL 194   TRIGLYCERIDES mg/dL 253*   HDL CHOL mg/dL 33*   LDL CHOL mg/dL 117*               Medication Review:   aspirin, 81 mg, Oral, Daily  atorvastatin, 40 mg, Oral, Nightly  enoxaparin, 40 mg, Subcutaneous, Daily  furosemide, 40 mg, Oral, Daily  insulin lispro, 0-9 Units, Subcutaneous, 4x Daily With Meals & Nightly  metoprolol tartrate, 25 mg, Oral, Q12H  miconazole, 1 application, Topical, Q12H  mupirocin, , Each Nare, BID  nystatin, , Topical, Q8H  pantoprazole, 40 mg, Oral, Q AM  potassium chloride, 20 mEq, Oral, Daily  senna-docusate sodium, 2 tablet, Oral, Nightly              Assessment/Plan    1. Multivessel coronary artery disease status post CABG x 3 (LIMA to LAD, SVG to OM1, SVG to OM2) 4/29/22  2. Diabetes mellitus type II   3. Obstructive sleep apnea - cpap at bedside  4. Hyperlipidemia   5. Hypertension  6. Morbid obesity.    Blood pressure on the low side but tolerating beta blocker.   She is in SR.   Pretty unremarkable post op course.   Continue supportive care.    DEANNA Collins  [facility]  05/02/22  12:56 EDT

## 2022-05-02 NOTE — THERAPY TREATMENT NOTE
Patient Name: Scarlett Ray  : 1955    MRN: [id number]                              Today's Date: 2022       Admit Date: 2022    Visit Dx:     ICD-10-CM ICD-9-CM   1. Coronary artery disease involving native coronary artery of native heart with unstable angina pectoris (HCC)  I25.110 414.01     411.1   2. Chest pain, unspecified type  R07.9 786.50   3. Abnormal findings on diagnostic imaging of heart and coronary circulation  R93.1 794.39   4. S/P CABG (coronary artery bypass graft)  Z95.1 V45.81     Patient Active Problem List   Diagnosis   • Obstructive sleep apnea, adult   • Essential hypertension, malignant   • Cor pulmonale (HCC)   • Chest pain   • Chest pain, unspecified type   • Coronary artery disease involving native coronary artery of native heart with unstable angina pectoris (HCC)   • Abnormal findings on diagnostic imaging of heart and coronary circulation     Past Medical History:   Diagnosis Date   • Asthma    • Elevated cholesterol    • H/O seasonal allergies    • HLD (hyperlipidemia)    • Hypertension    • Obesity    • NONI (obstructive sleep apnea)     compliant w/ CPAP   • Type 2 diabetes mellitus (HCC)     on metformin at home     Past Surgical History:   Procedure Laterality Date   • BREAST BIOPSY Left    • CARDIAC CATHETERIZATION N/A 2022    Procedure: Left Heart Cath;  Surgeon: Ada Martinez MD;  Location:  [facility];  Service: Cardiology;  Laterality: N/A;   • CARDIAC CATHETERIZATION N/A 2022    Procedure: Left ventriculography;  Surgeon: Ada Martinez MD;  Location:  [facility];  Service: Cardiology;  Laterality: N/A;   • CARDIAC CATHETERIZATION N/A 2022    Procedure: Coronary angiography;  Surgeon: Ada Martinez MD;  Location:  [facility];  Service: Cardiology;  Laterality: N/A;   •  SECTION     • DILATATION AND CURETTAGE      X2   • UMBILICAL HERNIA REPAIR        General Information     Row  Name 05/02/22 0923          Physical Therapy Time and Intention    Document Type therapy note (daily note)  -     Mode of Treatment physical therapy;individual therapy  -     Row Name 05/02/22 0923          General Information    Patient Profile Reviewed yes  -     Existing Precautions/Restrictions cardiac;sternal;fall;oxygen therapy device and L/min  -     Row Name 05/02/22 0923          Cognition    Orientation Status (Cognition) oriented x 4  -     Row Name 05/02/22 0923          Safety Issues, Functional Mobility    Impairments Affecting Function (Mobility) balance;endurance/activity tolerance;shortness of breath;strength;pain  -           User Key  (r) = Recorded By, (t) = Taken By, (c) = Cosigned By    Initials Name Provider Type     Tova Serna PT Physical Therapist               Mobility     Row Name 05/02/22 0923          Bed Mobility    All Activities, [place] (Bed Mobility) not tested  -     Supine-Sit [place] (Bed Mobility) not tested  -     Comment, (Bed Mobility) UIC  -     Row Name 05/02/22 0923          Sit-Stand Transfer    Sit-Stand [place] (Transfers) verbal cues;standby assist  -     Assistive Device (Sit-Stand Transfers) other (see comments)  No AD  -     Row Name 05/02/22 0923          Gait/Stairs (Locomotion)    [place] Level (Gait) verbal cues;standby assist  -     Assistive Device (Gait) other (see comments)  No AD  -     Distance in Feet (Gait) 150ft  -     Deviations/Abnormal Patterns (Gait) nafisa decreased;gait speed decreased;stride length decreased  -     Bilateral Gait Deviations forward flexed posture  -     [place] Level (Stairs) not tested  -     Comment, (Gait/Stairs) Tolerated gait well, distance limited 2/2 fatigue  -           User Key  (r) = Recorded By, (t) = Taken By, (c) = Cosigned By    Initials Name Provider Type     Tova Serna PT Physical Therapist               Obj/Interventions     Row Name  05/02/22 0924          Motor Skills    Therapeutic Exercise --  10 reps cardiac rehab protocol  -           User Key  (r) = Recorded By, (t) = Taken By, (c) = Cosigned By    Initials Name Provider Type     Tova Serna PT Physical Therapist               Goals/Plan    No documentation.                Clinical Impression     [facility] Name 05/02/22 0924          Pain    Pretreatment Pain Rating 0/10 - no pain  -     Posttreatment Pain Rating 0/10 - no pain  -BH     Row Name 05/02/22 0924          Plan of Care Review    Plan of Care Reviewed With patient;daughter  -     Progress improving  -     Outcome Evaluation Pt agreeable to PT this AM and continues to progress with independent mobility. Pt UIC and completed STS with SBA. Pt ambulated 150ft with min c/o fatigue towards end of gait. Ambulated on 2L O2 with no c/o SOA. Pt with improved steadiness requiring no HHA or assistance with gait. Pt will continue to benefit from skilled PT to address functional deficits and improve overall mobility. Anticipate D/C home with HHPT.  -[facility] 05/02/22 0924          Vital Signs    O2 Delivery Pre Treatment supplemental O2  -     O2 Delivery Intra Treatment supplemental O2  -     O2 Delivery Post Treatment supplemental O2  -BH     Row Name 05/02/22 0924          Positioning and Restraints    Pre-Treatment Position sitting in chair/recliner  -     Post Treatment Position bathroom  -     Bathroom sitting;with nsg  -           User Key  (r) = Recorded By, (t) = Taken By, (c) = Cosigned By    Initials Name Provider Type     Tova Serna PT Physical Therapist               Outcome Measures     [facility] Name 05/02/22 1021 05/02/22 0800       How much help from another person do you currently need...    Turning from your back to your side while in flat bed without using bedrails? 3  - 3  -TH    Moving from lying on back to sitting on the side of a flat bed without bedrails? 3  - 3  -TH    Moving to and  from a bed to a chair (including a wheelchair)? 4  - 3  -TH    Standing up from a chair using your arms (e.g., wheelchair, bedside chair)? 4  - 3  -TH    Climbing 3-5 steps with a railing? 2  - 2  -TH    To walk in hospital room? 3  - 3  -TH    AM-PAC 6 Clicks Score (PT) 19  - 17  -TH    Highest level of mobility 6 --> Walked 10 steps or more  - 5 --> Static standing  -    Row Name 05/02/22 1021          Functional Assessment    Outcome Measure Options AM-PAC 6 Clicks Basic Mobility (PT)  -           User Key  (r) = Recorded By, (t) = Taken By, (c) = Cosigned By    Initials Name Provider Type     Negar Soto, RN Registered Nurse     Tova Serna, PT Physical Therapist                             Physical Therapy Education                 Title: PT OT SLP Therapies (Done)     Topic: Physical Therapy (Done)     Point: Mobility training (Done)     Learning Progress Summary           Patient Acceptance, E,TB,D, VU,NR by  at 5/2/2022 1021    Acceptance, E,TB,D, VU,NR by  at 5/1/2022 1224    Acceptance, E,TB,D, VU,NR by  at 4/30/2022 1211                   Point: Home exercise program (Done)     Learning Progress Summary           Patient Acceptance, E,TB,D, VU,NR by  at 5/2/2022 1021    Acceptance, E,TB,D, VU,NR by  at 5/1/2022 1224    Acceptance, E,TB,D, VU,NR by  at 4/30/2022 1211                   Point: Body mechanics (Done)     Learning Progress Summary           Patient Acceptance, E,TB,D, VU,NR by  at 5/2/2022 1021    Acceptance, E,TB,D, VU,NR by  at 5/1/2022 1224    Acceptance, E,TB,D, VU,NR by  at 4/30/2022 1211                   Point: Precautions (Done)     Learning Progress Summary           Patient Acceptance, E,TB,D, VU,NR by  at 5/2/2022 1021    Acceptance, E,TB,D, VU,NR by  at 5/1/2022 1224    Acceptance, E,TB,D, VU,NR by  at 4/30/2022 1211                               User Key     Initials Effective Dates Name Provider Type Discipline     04/08/22  -  Tova Serna PT Physical Therapist PT              PT Recommendation and Plan  Planned Therapy Interventions (PT): balance training, bed mobility training, gait training, home exercise program, patient/family education, stair training, strengthening, transfer training  Plan of Care Reviewed With: patient, daughter  Progress: improving  Outcome Evaluation: Pt agreeable to PT this AM and continues to progress with independent mobility. Pt UIC and completed STS with SBA. Pt ambulated 150ft with min c/o fatigue towards end of gait. Ambulated on 2L O2 with no c/o SOA. Pt with improved steadiness requiring no HHA or assistance with gait. Pt will continue to benefit from skilled PT to address functional deficits and improve overall mobility. Anticipate D/C home with HHPT.     Time Calculation:    PT Charges     Row Name 05/02/22 1021             Time Calculation    Start Time 0852  -      Stop Time 0902  -      Time Calculation (min) 10 min  -      PT Received On 05/02/22  -      PT - Next Appointment 05/03/22  -              Time Calculation- PT    Total Timed Code Minutes- PT 10 minute(s)  -              Timed Charges    56915 - PT Therapeutic Exercise Minutes 2  -BH      11091 - Gait Training Minutes  8  -BH              Total Minutes    Timed Charges Total Minutes 10  -       Total Minutes 10  -            User Key  (r) = Recorded By, (t) = Taken By, (c) = Cosigned By    Initials Name Provider Type     Tova Serna PT Physical Therapist              Therapy Charges for Today     Code Description Service Date Service Provider Modifiers Qty    47744032688 HC GAIT TRAINING EA 15 MIN 5/1/2022 Tova Serna, PT GP 1    40772385398 HC PT THER SUPP EA 15 MIN 5/1/2022 Tova Serna, PT GP 1    31234295712 HC GAIT TRAINING EA 15 MIN 5/2/2022 Tova Serna, PT GP 1          PT G-Codes  Outcome Measure Options: AM-PAC 6 Clicks Basic Mobility (PT)  AM-PAC 6 Clicks Score (PT): 19    Tova  DICK Serna, PT  5/2/2022

## 2022-05-02 NOTE — PROGRESS NOTES
"..  4/28/2022  1:28 PM    LOS: 4 days   Patient Care Team:  Jeffry Espino MD as PCP - General  Jeffry Espino MD as PCP - Family Medicine    Chief Complaint:  Post-op    Subjective:  Symptoms:  Stable.  She reports malaise and weakness.  No shortness of breath or chest pain.    Diet:  Adequate intake.  No nausea or vomiting.    Activity level: Returning to normal.    Pain:  She complains of pain that is mild.        Vital Signs  Temp:  [98.6 °F (37 °C)-98.9 °F (37.2 °C)] 98.9 °F (37.2 °C)  Heart Rate:  [] 92  Resp:  [18-22] 18  BP: (109-142)/(73-91) 109/73  Body mass index is 46.04 kg/m².    Intake/Output Summary (Last 24 hours) at 5/2/2022 1233  Last data filed at 5/2/2022 0841  Gross per 24 hour   Intake 600 ml   Output 300 ml   Net 300 ml           05/01/22  0600 05/02/22  0300 05/02/22  0414   Weight: 116 kg (255 lb) 114 kg (251 lb 11.2 oz) 114 kg (251 lb 11.2 oz)         Objective:  General Appearance:  Comfortable, well-appearing and in no acute distress.    Vital signs: (most recent): Blood pressure 109/73, pulse 92, temperature 98.9 °F (37.2 °C), temperature source Oral, resp. rate 18, height 157.5 cm (62\"), weight 114 kg (251 lb 11.2 oz), SpO2 93 %, not currently breastfeeding.  Vital signs are normal.  No fever.    Output: Producing urine.    HEENT: Normal HEENT exam.    Lungs:  Normal effort and normal respiratory rate.  Breath sounds clear to auscultation.    Heart: Normal rate.  Regular rhythm.  S1 normal and S2 normal.    Abdomen: Abdomen is soft.  Bowel sounds are normal.     Pulses: Distal pulses are intact.    Neurological: Patient is alert and oriented to person, place and time.    Pupils:  Pupils are equal, round, and reactive to light.    Skin:  Warm and dry.  (Midsternal incision DANA -- healing nicely )            Results Review:    WBC WBC   Date Value Ref Range Status   05/02/2022 10.05 3.40 - 10.80 10*3/mm3 Final   05/01/2022 11.26 (H) 3.40 - 10.80 10*3/mm3 Final   04/30/2022 8.35 " 3.40 - 10.80 10*3/mm3 Final   04/29/2022 12.15 (H) 3.40 - 10.80 10*3/mm3 Final   04/29/2022 12.72 (H) 3.40 - 10.80 10*3/mm3 Final   04/29/2022 14.36 (H) 3.40 - 10.80 10*3/mm3 Final      HGB Hemoglobin   Date Value Ref Range Status   05/02/2022 11.9 (L) 12.0 - 15.9 g/dL Final   05/01/2022 11.6 (L) 12.0 - 15.9 g/dL Final   04/30/2022 11.2 (L) 12.0 - 15.9 g/dL Final   04/29/2022 12.3 12.0 - 15.9 g/dL Final   04/29/2022 12.4 12.0 - 15.9 g/dL Final   04/29/2022 13.7 12.0 - 15.9 g/dL Final   04/29/2022 10.9 (L) 12.0 - 17.0 g/dL Final   04/29/2022 11.6 (L) 12.0 - 17.0 g/dL Final   04/29/2022 11.2 (L) 12.0 - 17.0 g/dL Final      HCT Hematocrit   Date Value Ref Range Status   05/02/2022 35.6 34.0 - 46.6 % Final   05/01/2022 36.3 34.0 - 46.6 % Final   04/30/2022 34.4 34.0 - 46.6 % Final   04/29/2022 36.4 34.0 - 46.6 % Final   04/29/2022 35.9 34.0 - 46.6 % Final   04/29/2022 40.9 34.0 - 46.6 % Final   04/29/2022 32 (L) 38 - 51 % Final   04/29/2022 34 (L) 38 - 51 % Final   04/29/2022 33 (L) 38 - 51 % Final      Platelets Platelets   Date Value Ref Range Status   05/02/2022 212 140 - 450 10*3/mm3 Final   05/01/2022 204 140 - 450 10*3/mm3 Final   04/30/2022 171 140 - 450 10*3/mm3 Final   04/29/2022 202 140 - 450 10*3/mm3 Final   04/29/2022 195 140 - 450 10*3/mm3 Final   04/29/2022 227 140 - 450 10*3/mm3 Final        PT/INR:    Protime   Date Value Ref Range Status   04/30/2022 14.6 (H) 11.7 - 14.2 Seconds Final   04/29/2022 15.4 (H) 11.7 - 14.2 Seconds Final   /  INR   Date Value Ref Range Status   04/30/2022 1.15 (H) 0.90 - 1.10 Final   04/29/2022 1.23 (H) 0.90 - 1.10 Final       Sodium Sodium   Date Value Ref Range Status   05/02/2022 137 136 - 145 mmol/L Final   05/01/2022 137 136 - 145 mmol/L Final   04/30/2022 139 136 - 145 mmol/L Final   04/29/2022 140 136 - 145 mmol/L Final   04/29/2022 138 136 - 145 mmol/L Final      Potassium Potassium   Date Value Ref Range Status   05/02/2022 3.8 3.5 - 5.2 mmol/L Final   05/01/2022 4.0  3.5 - 5.2 mmol/L Final   04/30/2022 4.3 3.5 - 5.2 mmol/L Final   04/29/2022 3.9 3.5 - 5.2 mmol/L Final   04/29/2022 4.0 3.5 - 5.2 mmol/L Final     Comment:     Slight hemolysis detected by analyzer. Results may be affected.      Chloride Chloride   Date Value Ref Range Status   05/02/2022 100 98 - 107 mmol/L Final   05/01/2022 101 98 - 107 mmol/L Final   04/30/2022 105 98 - 107 mmol/L Final   04/29/2022 105 98 - 107 mmol/L Final   04/29/2022 105 98 - 107 mmol/L Final      Bicarbonate CO2   Date Value Ref Range Status   05/02/2022 24.0 22.0 - 29.0 mmol/L Final   05/01/2022 24.0 22.0 - 29.0 mmol/L Final   04/30/2022 21.0 (L) 22.0 - 29.0 mmol/L Final   04/29/2022 21.0 (L) 22.0 - 29.0 mmol/L Final   04/29/2022 22.0 22.0 - 29.0 mmol/L Final      BUN BUN   Date Value Ref Range Status   05/02/2022 18 8 - 23 mg/dL Final   05/01/2022 11 8 - 23 mg/dL Final   04/30/2022 10 8 - 23 mg/dL Final   04/29/2022 10 8 - 23 mg/dL Final   04/29/2022 10 8 - 23 mg/dL Final      Creatinine Creatinine   Date Value Ref Range Status   05/02/2022 0.69 0.57 - 1.00 mg/dL Final   05/01/2022 0.68 0.57 - 1.00 mg/dL Final   04/30/2022 0.77 0.57 - 1.00 mg/dL Final   04/29/2022 0.77 0.57 - 1.00 mg/dL Final   04/29/2022 0.93 0.57 - 1.00 mg/dL Final      Calcium Calcium   Date Value Ref Range Status   05/02/2022 9.2 8.6 - 10.5 mg/dL Final   05/01/2022 8.9 8.6 - 10.5 mg/dL Final   04/30/2022 8.8 8.6 - 10.5 mg/dL Final   04/29/2022 8.5 (L) 8.6 - 10.5 mg/dL Final   04/29/2022 9.4 8.6 - 10.5 mg/dL Final      Magnesium Magnesium   Date Value Ref Range Status   04/30/2022 2.7 (H) 1.6 - 2.4 mg/dL Final   04/29/2022 2.5 (H) 1.6 - 2.4 mg/dL Final   04/29/2022 2.6 (H) 1.6 - 2.4 mg/dL Final        aspirin, 81 mg, Oral, Daily  atorvastatin, 40 mg, Oral, Nightly  enoxaparin, 40 mg, Subcutaneous, Daily  furosemide, 40 mg, Oral, Daily  insulin lispro, 0-9 Units, Subcutaneous, 4x Daily With Meals & Nightly  metoprolol tartrate, 25 mg, Oral, Q12H  miconazole, 1  application, Topical, Q12H  mupirocin, , Each Nare, BID  nystatin, , Topical, Q8H  pantoprazole, 40 mg, Oral, Q AM  potassium chloride, 20 mEq, Oral, Daily  senna-docusate sodium, 2 tablet, Oral, Nightly           Patient Active Problem List   Diagnosis Code   • Obstructive sleep apnea, adult G47.33   • Essential hypertension, malignant I10   • Cor pulmonale (HCC) I27.81   • Chest pain R07.9   • Chest pain, unspecified type R07.9   • Coronary artery disease involving native coronary artery of native heart with unstable angina pectoris (HCC) I25.110   • Abnormal findings on diagnostic imaging of heart and coronary circulation R93.1       Assessment & Plan:  - Multi-vessel coronary artery disease - s/p CABG x2 & AVR POD#3 -- Dr. Castellon  - DM II -- non-insulin dependent -- HgbA1: 6.9  - HTN  - HLD  - Asthma  - Sleep apnea    Looks good this morning -- sitting in chair  Ra - 2L NC with ambulation  Discontinue AV wires  PO diurese  Overnight & walking oximetry  Continue routine care  Hope to discharge home with home health & family tomorrow    Thank you for allowing us to participate in this patient's plan of care.     DEANNA Nash  05/02/22  12:33 EDT   ..Electronically signed by DEANNA Nash, 05/02/22, 12:42 PM EDT.

## 2022-05-03 LAB
ANION GAP SERPL CALCULATED.3IONS-SCNC: 14 MMOL/L (ref 5–15)
BUN SERPL-MCNC: 19 MG/DL (ref 8–23)
BUN/CREAT SERPL: 30.2 (ref 7–25)
CALCIUM SPEC-SCNC: 9.2 MG/DL (ref 8.6–10.5)
CHLORIDE SERPL-SCNC: 99 MMOL/L (ref 98–107)
CO2 SERPL-SCNC: 24 MMOL/L (ref 22–29)
CREAT SERPL-MCNC: 0.63 MG/DL (ref 0.57–1)
DEPRECATED RDW RBC AUTO: 42.5 FL (ref 37–54)
EGFRCR SERPLBLD CKD-EPI 2021: 97.4 ML/MIN/1.73
ERYTHROCYTE [DISTWIDTH] IN BLOOD BY AUTOMATED COUNT: 13.5 % (ref 12.3–15.4)
GLUCOSE BLDC GLUCOMTR-MCNC: 116 MG/DL (ref 70–130)
GLUCOSE BLDC GLUCOMTR-MCNC: 169 MG/DL (ref 70–130)
GLUCOSE BLDC GLUCOMTR-MCNC: 226 MG/DL (ref 70–130)
GLUCOSE BLDC GLUCOMTR-MCNC: 232 MG/DL (ref 70–130)
GLUCOSE SERPL-MCNC: 144 MG/DL (ref 65–99)
HCT VFR BLD AUTO: 35.3 % (ref 34–46.6)
HGB BLD-MCNC: 11.7 G/DL (ref 12–15.9)
MCH RBC QN AUTO: 28.8 PG (ref 26.6–33)
MCHC RBC AUTO-ENTMCNC: 33.1 G/DL (ref 31.5–35.7)
MCV RBC AUTO: 86.9 FL (ref 79–97)
PLATELET # BLD AUTO: 252 10*3/MM3 (ref 140–450)
PMV BLD AUTO: 11 FL (ref 6–12)
POTASSIUM SERPL-SCNC: 3.9 MMOL/L (ref 3.5–5.2)
RBC # BLD AUTO: 4.06 10*6/MM3 (ref 3.77–5.28)
SODIUM SERPL-SCNC: 137 MMOL/L (ref 136–145)
WBC NRBC COR # BLD: 10.3 10*3/MM3 (ref 3.4–10.8)

## 2022-05-03 PROCEDURE — 97110 THERAPEUTIC EXERCISES: CPT

## 2022-05-03 PROCEDURE — 85027 COMPLETE CBC AUTOMATED: CPT | Performed by: NURSE PRACTITIONER

## 2022-05-03 PROCEDURE — 82962 GLUCOSE BLOOD TEST: CPT

## 2022-05-03 PROCEDURE — 25010000002 FUROSEMIDE PER 20 MG: Performed by: NURSE PRACTITIONER

## 2022-05-03 PROCEDURE — 99232 SBSQ HOSP IP/OBS MODERATE 35: CPT | Performed by: NURSE PRACTITIONER

## 2022-05-03 PROCEDURE — 25010000002 ENOXAPARIN PER 10 MG: Performed by: NURSE PRACTITIONER

## 2022-05-03 PROCEDURE — 63710000001 INSULIN LISPRO (HUMAN) PER 5 UNITS

## 2022-05-03 PROCEDURE — 63710000001 DIPHENHYDRAMINE PER 50 MG: Performed by: NURSE PRACTITIONER

## 2022-05-03 PROCEDURE — 80048 BASIC METABOLIC PNL TOTAL CA: CPT | Performed by: NURSE PRACTITIONER

## 2022-05-03 RX ORDER — DIAPER,BRIEF,INFANT-TODD,DISP
1 EACH MISCELLANEOUS EVERY 12 HOURS SCHEDULED
Status: DISCONTINUED | OUTPATIENT
Start: 2022-05-03 | End: 2022-05-04 | Stop reason: HOSPADM

## 2022-05-03 RX ORDER — FUROSEMIDE 10 MG/ML
40 INJECTION INTRAMUSCULAR; INTRAVENOUS ONCE
Status: COMPLETED | OUTPATIENT
Start: 2022-05-03 | End: 2022-05-03

## 2022-05-03 RX ORDER — BISACODYL 10 MG
10 SUPPOSITORY, RECTAL RECTAL ONCE
Status: DISCONTINUED | OUTPATIENT
Start: 2022-05-03 | End: 2022-05-04

## 2022-05-03 RX ORDER — DIPHENHYDRAMINE HCL 25 MG
25 CAPSULE ORAL EVERY 6 HOURS PRN
Status: DISCONTINUED | OUTPATIENT
Start: 2022-05-03 | End: 2022-05-04 | Stop reason: HOSPADM

## 2022-05-03 RX ADMIN — INSULIN GLARGINE-YFGN 15 UNITS: 100 INJECTION, SOLUTION SUBCUTANEOUS at 21:34

## 2022-05-03 RX ADMIN — METOPROLOL TARTRATE 37.5 MG: 25 TABLET, FILM COATED ORAL at 21:27

## 2022-05-03 RX ADMIN — MUPIROCIN 1 APPLICATION: 20 OINTMENT TOPICAL at 08:35

## 2022-05-03 RX ADMIN — METFORMIN HYDROCHLORIDE 500 MG: 500 TABLET ORAL at 08:35

## 2022-05-03 RX ADMIN — HYDROCODONE BITARTRATE AND ACETAMINOPHEN 2 TABLET: 5; 325 TABLET ORAL at 19:37

## 2022-05-03 RX ADMIN — INSULIN LISPRO 4 UNITS: 100 INJECTION, SOLUTION INTRAVENOUS; SUBCUTANEOUS at 21:28

## 2022-05-03 RX ADMIN — INSULIN LISPRO 2 UNITS: 100 INJECTION, SOLUTION INTRAVENOUS; SUBCUTANEOUS at 06:07

## 2022-05-03 RX ADMIN — MICONAZOLE NITRATE 1 APPLICATION: 2 CREAM TOPICAL at 21:29

## 2022-05-03 RX ADMIN — ENOXAPARIN SODIUM 40 MG: 100 INJECTION SUBCUTANEOUS at 08:35

## 2022-05-03 RX ADMIN — METOPROLOL TARTRATE 37.5 MG: 25 TABLET, FILM COATED ORAL at 08:33

## 2022-05-03 RX ADMIN — NYSTATIN: 100000 POWDER TOPICAL at 21:28

## 2022-05-03 RX ADMIN — POTASSIUM CHLORIDE 20 MEQ: 10 TABLET, EXTENDED RELEASE ORAL at 08:34

## 2022-05-03 RX ADMIN — MUPIROCIN 1 APPLICATION: 20 OINTMENT TOPICAL at 21:27

## 2022-05-03 RX ADMIN — ATORVASTATIN CALCIUM 40 MG: 20 TABLET, FILM COATED ORAL at 21:27

## 2022-05-03 RX ADMIN — DIPHENHYDRAMINE HYDROCHLORIDE 25 MG: 25 CAPSULE ORAL at 11:46

## 2022-05-03 RX ADMIN — HYDROCORTISONE 1 APPLICATION: 1 CREAM TOPICAL at 11:49

## 2022-05-03 RX ADMIN — MICONAZOLE NITRATE 1 APPLICATION: 2 CREAM TOPICAL at 08:37

## 2022-05-03 RX ADMIN — ASPIRIN 81 MG: 81 TABLET, COATED ORAL at 08:35

## 2022-05-03 RX ADMIN — INSULIN LISPRO 4 UNITS: 100 INJECTION, SOLUTION INTRAVENOUS; SUBCUTANEOUS at 11:43

## 2022-05-03 RX ADMIN — HYDROCODONE BITARTRATE AND ACETAMINOPHEN 2 TABLET: 5; 325 TABLET ORAL at 06:19

## 2022-05-03 RX ADMIN — PANTOPRAZOLE SODIUM 40 MG: 40 TABLET, DELAYED RELEASE ORAL at 06:07

## 2022-05-03 RX ADMIN — HYDROCORTISONE 1 APPLICATION: 1 CREAM TOPICAL at 21:29

## 2022-05-03 RX ADMIN — NYSTATIN: 100000 POWDER TOPICAL at 14:59

## 2022-05-03 RX ADMIN — METFORMIN HYDROCHLORIDE 500 MG: 500 TABLET ORAL at 16:32

## 2022-05-03 RX ADMIN — NYSTATIN: 100000 POWDER TOPICAL at 06:07

## 2022-05-03 RX ADMIN — FUROSEMIDE 40 MG: 10 INJECTION, SOLUTION INTRAMUSCULAR; INTRAVENOUS at 08:36

## 2022-05-03 RX ADMIN — BISACODYL 10 MG: 10 SUPPOSITORY RECTAL at 08:33

## 2022-05-03 NOTE — CONSULTS
"Nutrition Services    Patient Name: Scarlett Ray  YOB: 1955  MRN: [id number]  Admission date: 4/28/2022      NUTRITION EDUCATION        Reason for consult: MD consult        Education topic: Diabetic, heart healthy diet        Resources given:  Carbohydrate counting for diabetics  TLC guidelines        Advised regarding: Food & beverage choices  Food prep  Meal planning  Appropriate portions  Seasoning foods  Eating pattern  Other:          Learner:  Pateint        Motivation/barriers: Eager to learn, verbalized understanding        Food & nutrition history: Diagnosed with type 2 diabetes about 4 years. No prior education on diet but knew she should avoid certain foods that cause BG to rise. Didn't always comply or follow a meal pattern. Eats 2 meals/day.        Current height: Height: 157.5 cm (62\")    Current weight: Weight: 118 kg (259 lb 3.2 oz) (05/03/22 0555)    BMI: Body mass index is 47.41 kg/m².        Labs:  Reviewed, listed below        Pertinent medications:  Insulin, metformin        Monitor/Evaluation: RD to follow per protocol, reinforce prn        Discharge Planning           RD contact info provided: Yes. Patient/caregiver to call with any questions/concerns.      Outpatient referral: No referral but may benefit from OP DSMT classes     Comments:   Encouraged pt to take our 7 day menu cycle home with her for meal ideas, as it contains CHO content of each food served. Instructed pt to stay within 2-3 CHO svg per meal (30-45 gm) and eat 3 meals days instead of 2 large meals.     Results from last 7 days   Lab Units 05/03/22  0251 05/02/22  0247 05/01/22  0308 04/29/22  0254 04/28/22  2220 04/28/22  1017   SODIUM mmol/L 137 137 137   < > 137 139   POTASSIUM mmol/L 3.9 3.8 4.0   < > 4.1 4.0   CHLORIDE mmol/L 99 100 101   < > 103 103   CO2 mmol/L 24.0 24.0 24.0   < > 21.5* 25.6   BUN mg/dL 19 18 11   < > 14 11   CREATININE mg/dL 0.63 0.69 0.68   < > 0.86 0.87   CALCIUM mg/dL 9.2 9.2 8.9  "  < > 9.5 9.4   BILIRUBIN mg/dL  --   --   --   --  0.5 0.3   ALK PHOS U/L  --   --   --   --  160* 183*   ALT (SGPT) U/L  --   --   --   --  13 10   AST (SGOT) U/L  --   --   --   --  16 14   GLUCOSE mg/dL 144* 146* 143*   < > 178* 163*    < > = values in this interval not displayed.     Results from last 7 days   Lab Units 05/03/22  0251 05/01/22  0308 04/30/22  0310 04/29/22 2008 04/29/22  1844 04/29/22  1523 04/29/22  1054 04/29/22  0254   MAGNESIUM mg/dL  --   --  2.7*  --  2.5* 2.6*  --   --    PHOSPHORUS mg/dL  --   --  3.7  --  3.6 2.0*   < >  --    HEMOGLOBIN g/dL 11.7*   < > 11.2*   < > 12.4 13.7  --   --    HEMOGLOBIN, POC   --   --   --   --   --   --    < >  --    HEMATOCRIT % 35.3   < > 34.4   < > 35.9 40.9  --   --    HEMATOCRIT POC   --   --   --   --   --   --    < >  --    TRIGLYCERIDES mg/dL  --   --   --   --   --   --   --  253*    < > = values in this interval not displayed.     COVID19   Date Value Ref Range Status   04/28/2022 Not Detected Not Detected - Ref. Range Final     Lab Results   Component Value Date    HGBA1C 7.10 (H) 04/29/2022           Electronically signed by:  Daisy Proctor, JULIA  05/03/22 10:53 EDT

## 2022-05-03 NOTE — NURSING NOTE
"Diabetes Education  Assessment/Teaching    Patient Name:  Scarlett Ray  YOB: 1955  MRN: [id number]  Admit Date:  4/28/2022      Assessment Date:  5/3/2022  Flowsheet Row Most Recent Value   General Information     Referral From: MD pedraza. F/u with 67 y/o at bedside for (long-acting) insulin teaching.    Height 157.5 cm (62\")   Height Method Stated   Weight 118 kg (259 lb 3.2 oz)   Weight Method Standing scale   Diabetes History    What type of diabetes do you have? Type 2   Length of Diabetes Diagnosis -- 4 yrs.   Have you had hypoglycemia? no   Have you had diabetes education/teaching in the past? No. Initiated here in the hospital yesterday.   Education Preferences    Barriers to Learning -- no learning barriers noted at this time.   Assessment Topics    Taking Medication - Assessment Needs education -pt to dc home new to long-acting insulin per pt. Pt's spouse Luis Antonio is here at bedside. He reports also having (T2) DM. He has given injections in the past (to calves) with vial/syringe.    Problem Solving - Assessment -- Discuss hypoglycemia.   Healthy Coping - Assessment Competent -supportive spouse here at bedside.   Monitoring - Assessment Competent   DM Goals    Contact Plan Follow-up medical care          Flowsheet Row Most Recent Value   DM Education Needs    Meter Has own   Medication Lantus Insulin name, Administration. Insulin pen use vs vial/syringe use.   Problem Solving Hypoglycemia, Signs, Treatment   Healthy Coping Appropriate   Discharge Plan Home, with home health follow up. Follow-up with MD   Motivation Engaged   Teaching Method Explanation, Discussion, Demonstration, Handouts, Teach back   Patient Response Verbalized understanding -anticipate pt's spouse to assist pt or administer insulin to pt.      Other Comments:  Pls write rx for insulin via vial per spouse request. Thank you.  Electronically signed by:  Shruti Foster RN  05/03/22 17:27 EDT  "

## 2022-05-03 NOTE — THERAPY TREATMENT NOTE
Patient Name: Scarlett Ray  : 1955    MRN: [id number]                              Today's Date: 5/3/2022       Admit Date: 2022    Visit Dx:     ICD-10-CM ICD-9-CM   1. Coronary artery disease involving native coronary artery of native heart with unstable angina pectoris (HCC)  I25.110 414.01     411.1   2. Chest pain, unspecified type  R07.9 786.50   3. Abnormal findings on diagnostic imaging of heart and coronary circulation  R93.1 794.39   4. S/P CABG (coronary artery bypass graft)  Z95.1 V45.81     Patient Active Problem List   Diagnosis   • Obstructive sleep apnea, adult   • Essential hypertension, malignant   • Cor pulmonale (HCC)   • Chest pain   • Chest pain, unspecified type   • Coronary artery disease involving native coronary artery of native heart with unstable angina pectoris (HCC)   • Abnormal findings on diagnostic imaging of heart and coronary circulation     Past Medical History:   Diagnosis Date   • Asthma    • Elevated cholesterol    • H/O seasonal allergies    • HLD (hyperlipidemia)    • Hypertension    • Obesity    • NONI (obstructive sleep apnea)     compliant w/ CPAP   • Type 2 diabetes mellitus (HCC)     on metformin at home     Past Surgical History:   Procedure Laterality Date   • BREAST BIOPSY Left    • CARDIAC CATHETERIZATION N/A 2022    Procedure: Left Heart Cath;  Surgeon: Ada Martinez MD;  [facility];  Service: Cardiology;  Laterality: N/A;   • CARDIAC CATHETERIZATION N/A 2022    Procedure: Left ventriculography;  Surgeon: Ada Martinez MD;  Location:  [facility] CATH INVASIVE LOCATION;  Service: Cardiology;  Laterality: N/A;   • CARDIAC CATHETERIZATION N/A 2022    Procedure: Coronary angiography;  Surgeon: Ada Martinez MD;  Location:  [facility] CATH INVASIVE LOCATION;  Service: Cardiology;  Laterality: N/A;   •  SECTION     • CORONARY ARTERY BYPASS GRAFT N/A 2022    Procedure: MIDLINE STERNOTOMY, CORONARY ARTERY  BYPASS GRAFTING X3 , UTILIZING THE RIGHT SAPHENOUS VEIN AND LEFT SAM, RAINE, PRP;  Surgeon: Rodolfo Castellon MD;  Location: [facility];  Service: Cardiothoracic;  Laterality: N/A;   • CORONARY ARTERY BYPASS GRAFT  4/29/2022    Procedure: ;  Surgeon: Rodolfo Castellon MD;  Location: [facility];  Service: Cardiothoracic;;   • DILATATION AND CURETTAGE      X2   • UMBILICAL HERNIA REPAIR        General Information     Row Name 05/03/22 1326          Physical Therapy Time and Intention    Document Type therapy note (daily note)  -AR     Mode of Treatment physical therapy;individual therapy  -AR     Row Name 05/03/22 1326          General Information    Patient Profile Reviewed yes  -AR     Existing Precautions/Restrictions cardiac;sternal;oxygen therapy device and L/min  -AR     Barriers to Rehab none identified  -AR     Row Name 05/03/22 1326          Living Environment    People in Home spouse  -AR     Row Name 05/03/22 1326          Home Main Entrance    Number of Stairs, Main Entrance none  -AR     Row Name 05/03/22 1326          Stairs Within Home, Primary    Stairs, Within Home, Primary ramp to enter  -AR     Row Name 05/03/22 1326          Cognition    Orientation Status (Cognition) oriented x 4  -AR     Row Name 05/03/22 1326          Safety Issues, Functional Mobility    Impairments Affecting Function (Mobility) balance;endurance/activity tolerance;shortness of breath;strength;pain  -AR           User Key  (r) = Recorded By, (t) = Taken By, (c) = Cosigned By    Initials Name Provider Type    AR Keyanna Sesay PT Physical Therapist               Mobility     Row Name 05/03/22 1326          Bed Mobility    Supine-Sit [place] (Bed Mobility) not tested  -AR     Sit-Supine [place] (Bed Mobility) not tested  -AR     Row Name 05/03/22 1326          Sit-Stand Transfer    Sit-Stand [place] (Transfers) standby assist  -AR     Row Name 05/03/22 1326          Gait/Stairs (Locomotion)     [place] Level (Gait) standby assist;contact guard  -AR     Distance in Feet (Gait) 150' no AD, no loss of balance or safety concerns.  -AR     Deviations/Abnormal Patterns (Gait) gait speed decreased  -AR     Bilateral Gait Deviations forward flexed posture  -AR           User Key  (r) = Recorded By, (t) = Taken By, (c) = Cosigned By    Initials Name Provider Type    AR Keyanna Sesay, PT Physical Therapist               Obj/Interventions     Row Name 05/03/22 1328          Motor Skills    Therapeutic Exercise --  cardiac protocol 10x w/ ed for HEP  -AR     Row Name 05/03/22 1328          Balance    Dynamic Standing Balance standby assist  -AR           User Key  (r) = Recorded By, (t) = Taken By, (c) = Cosigned By    Initials Name Provider Type    AR Keyanna Sesay, PT Physical Therapist               Goals/Plan    No documentation.                Clinical Impression     Row Name 05/03/22 1328          Pain    Pretreatment Pain Rating 0/10 - no pain  -AR     Posttreatment Pain Rating 0/10 - no pain  -AR     Pain Intervention(s) Repositioned  -AR     Row Name 05/03/22 1328          Plan of Care Review    Plan of Care Reviewed With patient  -AR     Outcome Evaluation Improved independence w/ mobility during PT today.  Able to ambulate 150' w/ stand by assist, no UE support or use of assistive device.  Sp02 to 91% after ambulating on room air.  Reveiwed activity/walking recommendations, sternal precautions and PT POC to DC PT.  Recommend DC to home  with assist as needed.  -AR     Row Name 05/03/22 1328          Vital Signs    Pre SpO2 (%) 95  -AR     O2 Delivery Pre Treatment room air  -AR     Intra SpO2 (%) 91  -AR     O2 Delivery Intra Treatment room air  -AR     Post SpO2 (%) 96  -AR     O2 Delivery Post Treatment room air  -AR     Row Name 05/03/22 1328          Positioning and Restraints    Pre-Treatment Position sitting in chair/recliner  no alarm on arrival  -AR     Post Treatment Position chair  -AR      In Chair notified nsg;reclined;sitting;call light within reach;encouraged to call for assist  -AR           User Key  (r) = Recorded By, (t) = Taken By, (c) = Cosigned By    Initials Name Provider Type    Keyanna Lopez, PT Physical Therapist               Outcome Measures     Row Name 05/03/22 1332 05/03/22 0830       How much help from another person do you currently need...    Turning from your back to your side while in flat bed without using bedrails? 4  -AR 4  -AC    Moving from lying on back to sitting on the side of a flat bed without bedrails? 4  -AR 4  -AC    Moving to and from a bed to a chair (including a wheelchair)? 4  -AR 4  -AC    Standing up from a chair using your arms (e.g., wheelchair, bedside chair)? 4  -AR 4  -AC    Climbing 3-5 steps with a railing? 3  -AR 2  -AC    To walk in hospital room? 3  -AR 3  -AC    AM-PAC 6 Clicks Score (PT) 22  -AR 21  -AC    Highest level of mobility 7 --> Walked 25 feet or more  -AR 6 --> Walked 10 steps or more  -AC    Row Name 05/03/22 1332          Functional Assessment    Outcome Measure Options AM-PAC 6 Clicks Basic Mobility (PT)  -AR           User Key  (r) = Recorded By, (t) = Taken By, (c) = Cosigned By    Initials Name Provider Type    AC Marilia Moran, RN Registered Nurse    Keyanna Lopez, PT Physical Therapist                             Physical Therapy Education                 Title: PT OT SLP Therapies (Done)     Topic: Physical Therapy (Done)     Point: Mobility training (Done)     Learning Progress Summary           Patient Acceptance, E, VU by AR at 5/3/2022 1332    Acceptance, E,TB,D, VU,NR by  at 5/2/2022 1021    Acceptance, E,TB,D, VU,NR by  at 5/1/2022 1224    Acceptance, E,TB,D, VU,NR by  at 4/30/2022 1211                   Point: Home exercise program (Done)     Learning Progress Summary           Patient Acceptance, E, VU by AR at 5/3/2022 1332    Acceptance, E,TB,D, VU,NR by  at 5/2/2022 1021    Acceptance, E,TB,D,  VU,NR by  at 5/1/2022 1224    Acceptance, E,TB,D, VU,NR by  at 4/30/2022 1211                   Point: Body mechanics (Done)     Learning Progress Summary           Patient Acceptance, E, VU by AR at 5/3/2022 1332    Acceptance, E,TB,D, VU,NR by  at 5/2/2022 1021    Acceptance, E,TB,D, VU,NR by  at 5/1/2022 1224    Acceptance, E,TB,D, VU,NR by  at 4/30/2022 1211                   Point: Precautions (Done)     Learning Progress Summary           Patient Acceptance, E, VU by AR at 5/3/2022 1332    Acceptance, E,TB,D, VU,NR by  at 5/2/2022 1021    Acceptance, E,TB,D, VU,NR by  at 5/1/2022 1224    Acceptance, E,TB,D, VU,NR by  at 4/30/2022 1211                               User Key     Initials Effective Dates Name Provider Type Discipline    AR 06/16/21 -  Keyanna Sesay, PT Physical Therapist PT     04/08/22 -  Tova Serna PT Physical Therapist PT              PT Recommendation and Plan     Plan of Care Reviewed With: patient  Outcome Evaluation: Improved independence w/ mobility during PT today.  Able to ambulate 150' w/ stand by assist, no UE support or use of assistive device.  Sp02 to 91% after ambulating on room air.  Reveiwed activity/walking recommendations, sternal precautions and PT POC to DC PT.  Recommend DC to home  with assist as needed.     Time Calculation:    PT Charges     Row Name 05/03/22 1325             Time Calculation    Start Time 1040  -AR      Stop Time 1103  -AR      Time Calculation (min) 23 min  -AR      PT Received On 05/03/22  -AR            User Key  (r) = Recorded By, (t) = Taken By, (c) = Cosigned By    Initials Name Provider Type    AR Keyanna Sesay, PT Physical Therapist              Therapy Charges for Today     Code Description Service Date Service Provider Modifiers Qty    84099369698 HC PT THER PROC EA 15 MIN 5/3/2022 Keyanna Sesay PT GP 2          PT G-Codes  Outcome Measure Options: AM-PAC 6 Clicks Basic Mobility (PT)  AM-PAC 6 Clicks Score  (PT): 22    Keyanna Sesay, PT  5/3/2022

## 2022-05-03 NOTE — DISCHARGE PLACEMENT REQUEST
"Scarlett Ray (66 y.o. Female)             Date of Birth   1955    Social Security Number       Address   [address] [place], [place] [address]    Home Phone   [phone]    MRN   [id number]       Christian   None    Marital Status                               Admission Date   4/28/22    Admission Type   Elective    Admitting Provider       Attending Provider   Jon Guerrier MD    Department, Room/Bed   [facility] CARDIOVASC UNIT, 2220/1       Discharge Date       Discharge Disposition       Discharge Destination                               Attending Provider: Jon Guerrier MD    Allergies: Ace Inhibitors, Latex    Isolation: None   Infection: None   Code Status: CPR   Advance Care Planning Activity    Ht: 157.5 cm (62\")   Wt: 118 kg (259 lb 3.2 oz)    Admission Cmt: None   Principal Problem: Chest pain [R07.9] More...                 Active Insurance as of 4/28/2022     Primary Coverage     Payor Plan Insurance Group Employer/Plan Group    ANTHEM MEDICARE REPLACEMENT ANTHEM MEDICARE ADVANTAGE [id number]     Payor Plan Address Payor Plan Phone Number Payor Plan Fax Number Effective Dates    [address] [phone]  10/1/2019 - None Entered    [facility] [address]       Subscriber Name Subscriber Birth Date Member ID       SCARLETT RAY 1955 [id number]                 Emergency Contacts      (Rel.) Home Phone Work Phone Mobile Phone    Dg Ray (Spouse) [phone]    Carol Garvin (Daughter) [phone] -- [phone]              "

## 2022-05-03 NOTE — PLAN OF CARE
Goal Outcome Evaluation:  Plan of Care Reviewed With: patient           Outcome Evaluation: Improved independence w/ mobility during PT today.  Able to ambulate 150' w/ stand by assist, no UE support or use of assistive device.  Sp02 to 91% after ambulating on room air.  Reveiwed activity/walking recommendations, sternal precautions and PT POC to DC PT.  Recommend DC to home  with assist as needed.

## 2022-05-03 NOTE — PROGRESS NOTES
"    Patient Name: Scarlett Ray  :1955  66 y.o.      Patient Care Team:  Jeffry Espino MD as PCP - General  Jeffry Espino MD as PCP - Family Medicine    Chief Complaint: follow up coronary artery disease status post CABG    Interval History: had overnight oximetry last night. Currently on room air, walking ox pending. She feels well. Has some HASSAN. Pain well controlled.        Objective   Vital Signs  Temp:  [99 °F (37.2 °C)-99.4 °F (37.4 °C)] 99.1 °F (37.3 °C)  Heart Rate:  [] 95  Resp:  [18] 18  BP: (115-159)/(74-83) 130/77    Intake/Output Summary (Last 24 hours) at 5/3/2022 1144  Last data filed at 5/3/2022 1100  Gross per 24 hour   Intake 420 ml   Output 1601 ml   Net -1181 ml     Flowsheet Rows    Flowsheet Row First Filed Value   Admission Height 157.5 cm (62\") Documented at 2022 1335   Admission Weight 119 kg (262 lb) Documented at 2022 1335          Physical Exam:   General Appearance:    Alert, cooperative, in no acute distress   Lungs:     Clear to auscultation.  Normal respiratory effort and rate.      Heart:    Regular rhythm and normal rate, normal S1 and S2, no murmurs, gallops or rubs.     Chest Wall:    No abnormalities observed   Abdomen:     Soft, nontender, positive bowel sounds.     Extremities:   no cyanosis, clubbing or edema.  No marked joint deformities.  Adequate musculoskeletal strength.       Results Review:    Results from last 7 days   Lab Units 22  0251   SODIUM mmol/L 137   POTASSIUM mmol/L 3.9   CHLORIDE mmol/L 99   CO2 mmol/L 24.0   BUN mg/dL 19   CREATININE mg/dL 0.63   GLUCOSE mg/dL 144*   CALCIUM mg/dL 9.2     Results from last 7 days   Lab Units 22  1017   TROPONIN T ng/mL <0.010     Results from last 7 days   Lab Units 22  0251   WBC 10*3/mm3 10.30   HEMOGLOBIN g/dL 11.7*   HEMATOCRIT % 35.3   PLATELETS 10*3/mm3 252     Results from last 7 days   Lab Units 22  0310 22  1523 22  2220   INR  1.15* 1.23* 0.95   APTT " seconds  --  31.4 31.9     Results from last 7 days   Lab Units 04/30/22  0310   MAGNESIUM mg/dL 2.7*     Results from last 7 days   Lab Units 04/29/22  0254   CHOLESTEROL mg/dL 194   TRIGLYCERIDES mg/dL 253*   HDL CHOL mg/dL 33*   LDL CHOL mg/dL 117*               Medication Review:   aspirin, 81 mg, Oral, Daily  atorvastatin, 40 mg, Oral, Nightly  bisacodyl, 10 mg, Rectal, Once  enoxaparin, 40 mg, Subcutaneous, Daily  furosemide, 40 mg, Oral, Daily  hydrocortisone, 1 application, Topical, Q12H  insulin glargine, 15 Units, Subcutaneous, Nightly  insulin lispro, 0-9 Units, Subcutaneous, 4x Daily With Meals & Nightly  metFORMIN, 500 mg, Oral, BID With Meals  metoprolol tartrate, 37.5 mg, Oral, Q12H  miconazole, 1 application, Topical, Q12H  mupirocin, , Each Nare, BID  nystatin, , Topical, Q8H  pantoprazole, 40 mg, Oral, Q AM  potassium chloride, 20 mEq, Oral, Daily  senna-docusate sodium, 2 tablet, Oral, Nightly              Assessment/Plan    1. Multivessel coronary artery disease status post CABG x 3 (LIMA to LAD, SVG to OM1, SVG to OM2) 4/29/22. CXR yest with pleural effusions and congestion. Getting IV lasix today. Needs O2 bled into cpap. Walking ox pending. She had a BM today. Likely home tomorrow with HH. Remains in SR. Doing well.   2. Diabetes mellitus type II   3. Obstructive sleep apnea - cpap at bedside  4. Hyperlipidemia   5. Hypertension  6. Morbid obesity.    DEANNA Collins  [facility]  05/03/22  11:44 EDT

## 2022-05-03 NOTE — CASE MANAGEMENT/SOCIAL WORK
Discharge Planning Assessment  [facility]     Patient Name: Scarlett Ray  MRN: [id number]  Today's Date: 5/3/2022    Admit Date: 4/28/2022     Discharge Needs Assessment     Row Name 05/03/22 1532       Living Environment    People in Home spouse    Name(s) of People in Home Dg Ray, spouse    Current Living Arrangements home    Primary Care Provided by self    Provides Primary Care For no one    Family Caregiver if Needed spouse    Family Caregiver Names Dg, spouse    Quality of Family Relationships helpful;involved;supportive    Able to Return to Prior Arrangements yes       Resource/Environmental Concerns    Resource/Environmental Concerns none    Transportation Concerns none       Transition Planning    Patient/Family Anticipates Transition to home with family    Patient/Family Anticipated Services at Transition home health care    Transportation Anticipated family or friend will provide       Discharge Needs Assessment    Readmission Within the Last 30 Days no previous admission in last 30 days    Equipment Currently Used at Home glucometer;cpap;grab bar;ramp    Concerns to be Addressed discharge planning    Anticipated Changes Related to Illness none    Equipment Needed After Discharge walker, rolling    Discharge Facility/Level of Care Needs home with home health    Provided Post Acute Provider List? Yes    Post Acute Provider List Home Health    Delivered To Patient    Method of Delivery In person    Patient's Choice of Community Agency(s) [facility]                Discharge Plan     Row Name 05/03/22 1534       Plan    Plan Home w/ [facility]; [facility] to provide rolling walker prior to admit.    Plan Comments CCP spoke with Pt at bedside.  CCP introduced self and role.  Pt confirmed information on her face sheet.  Pt stated she is IADL’s, retired and drives.  Pt reports she lives with spouse, Dg Ray ([phone]) in a single story mobile home with ramp entrance.  Pt reports PCP is Jeffry  Srinivas.  Pt confirmed pharmacy is [facility] in [place], [place].  Pt denies use of past home health or going to a sub-acute rehab.  Pt uses the following DME- glucometer and CPAP ([facility]).  Pt plans to return home at discharge and request a rolling walker and BSC at d/c.  Pt chose [facility] as DME provider.  CCP called referral to [facility].  [facility] advised Pt does qualify for a rolling walker but not a BSC per medicare.  CCP did inform Pt that she could private pay for a bedside commode on [employer] for around $62.00 and at [employer] for around $60.00.  Pt verbalized understanding.  Pt chose [facility]  to follow her at d/c.  CCP will continue to follow…….Heather BARRAZA /.              Continued Care and Services - Admitted Since 4/28/2022     Durable Medical Equipment     Service Provider Request Status Selected Services Address Phone Fax Patient Preferred    [facility]  Pending - Request Sent N/A [address], [facility] [address] [phone] --          Home Medical Care     Service Provider Request Status Selected Services Address Phone Fax Patient Preferred    [facility]  Accepted N/A [address] [place] [address] [id number] [phone] --              Expected Discharge Date and Time     Expected Discharge Date Expected Discharge Time    May 5, 2022          Demographic Summary     Row Name 05/03/22 1531       General Information    Admission Type inpatient    Arrived From PACU/recovery room    Required Notices Provided Important Message from Medicare    Referral Source admission list    Reason for Consult discharge planning    Preferred Language English       Contact Information    Permission Granted to Share Info With family/designee               Functional Status     Row Name 05/03/22 1532       Functional Status    Usual Activity Tolerance good    Current Activity Tolerance moderate       Functional Status, IADL    Medications  independent    Meal Preparation independent    Housekeeping independent    Laundry independent    Shopping independent       Mental Status    General Appearance WDL WDL       Mental Status Summary    Recent Changes in Mental Status/Cognitive Functioning no changes       Employment/    Employment Status retired               Psychosocial    No documentation.                Abuse/Neglect    No documentation.                Legal    No documentation.                Substance Abuse    No documentation.                Patient Forms    No documentation.                   Heather Marquez RN

## 2022-05-03 NOTE — PLAN OF CARE
Goal Outcome Evaluation:  Plan of Care Reviewed With: patient        Progress: improving  Outcome Evaluation: VSS. Up ad clement today. Skin care as ordered. Epicardial wire removed, tolerated well. SATs on room air 92-95%. Rash to right and mid chest, medicated per orders. Possible home tomorrow.

## 2022-05-03 NOTE — PROGRESS NOTES
" LOS: 5 days   Patient Care Team:  Jeffry Espino MD as PCP - General  Jeffry Espino MD as PCP - Family Medicine    Chief Complaint: post op    Subjective:  Symptoms:  No shortness of breath, cough or chest pain.    Diet:  Adequate intake.  No nausea or vomiting.    Activity level: Impaired due to weakness.    Pain:  She complains of pain that is mild.  Pain is well controlled.      C/o pruritic rash on chest from tape    Vital Signs  Temp:  [98.9 °F (37.2 °C)-99.4 °F (37.4 °C)] 99.1 °F (37.3 °C)  Heart Rate:  [] 95  Resp:  [18] 18  BP: (109-159)/(73-83) 130/77  Body mass index is 47.41 kg/m².    Intake/Output Summary (Last 24 hours) at 5/3/2022 0809  Last data filed at 5/3/2022 0555  Gross per 24 hour   Intake 660 ml   Output 1600 ml   Net -940 ml     No intake/output data recorded.          05/02/22  0300 05/02/22  0414 05/03/22  0555   Weight: 114 kg (251 lb 11.2 oz) 114 kg (251 lb 11.2 oz) 118 kg (259 lb 3.2 oz)         Objective:  General Appearance:  Comfortable and in no acute distress.    Vital signs: (most recent): Blood pressure 130/77, pulse 95, temperature 99.1 °F (37.3 °C), temperature source Oral, resp. rate 18, height 157.5 cm (62\"), weight 118 kg (259 lb 3.2 oz), SpO2 (!) 88 %, not currently breastfeeding.  Vital signs are normal.  No fever.    Output: Producing urine and no stool output.    Lungs:  Normal effort and normal respiratory rate.  There are rales and decreased breath sounds.    Heart: Tachycardia.  Regular rhythm.  (ST on tele monitor)  Abdomen: Abdomen is soft.  Bowel sounds are normal.     Extremities: There is dependent edema.    Pulses: Distal pulses are intact.    Neurological: Patient is alert and oriented to person, place and time.    Skin:  Warm and dry.  (Sternal incision clean and dry with echymosis noted. Swelling at the top of incision)        Results Review:        WBC WBC   Date Value Ref Range Status   05/03/2022 10.30 3.40 - 10.80 10*3/mm3 Final   05/02/2022 10.05 " 3.40 - 10.80 10*3/mm3 Final   05/01/2022 11.26 (H) 3.40 - 10.80 10*3/mm3 Final      HGB Hemoglobin   Date Value Ref Range Status   05/03/2022 11.7 (L) 12.0 - 15.9 g/dL Final   05/02/2022 11.9 (L) 12.0 - 15.9 g/dL Final   05/01/2022 11.6 (L) 12.0 - 15.9 g/dL Final      HCT Hematocrit   Date Value Ref Range Status   05/03/2022 35.3 34.0 - 46.6 % Final   05/02/2022 35.6 34.0 - 46.6 % Final   05/01/2022 36.3 34.0 - 46.6 % Final      Platelets Platelets   Date Value Ref Range Status   05/03/2022 252 140 - 450 10*3/mm3 Final   05/02/2022 212 140 - 450 10*3/mm3 Final   05/01/2022 204 140 - 450 10*3/mm3 Final        PT/INR:  No results found for: PROTIME/No results found for: INR    Sodium Sodium   Date Value Ref Range Status   05/03/2022 137 136 - 145 mmol/L Final   05/02/2022 137 136 - 145 mmol/L Final   05/01/2022 137 136 - 145 mmol/L Final      Potassium Potassium   Date Value Ref Range Status   05/03/2022 3.9 3.5 - 5.2 mmol/L Final   05/02/2022 3.8 3.5 - 5.2 mmol/L Final   05/01/2022 4.0 3.5 - 5.2 mmol/L Final      Chloride Chloride   Date Value Ref Range Status   05/03/2022 99 98 - 107 mmol/L Final   05/02/2022 100 98 - 107 mmol/L Final   05/01/2022 101 98 - 107 mmol/L Final      Bicarbonate CO2   Date Value Ref Range Status   05/03/2022 24.0 22.0 - 29.0 mmol/L Final   05/02/2022 24.0 22.0 - 29.0 mmol/L Final   05/01/2022 24.0 22.0 - 29.0 mmol/L Final      BUN BUN   Date Value Ref Range Status   05/03/2022 19 8 - 23 mg/dL Final   05/02/2022 18 8 - 23 mg/dL Final   05/01/2022 11 8 - 23 mg/dL Final      Creatinine Creatinine   Date Value Ref Range Status   05/03/2022 0.63 0.57 - 1.00 mg/dL Final   05/02/2022 0.69 0.57 - 1.00 mg/dL Final   05/01/2022 0.68 0.57 - 1.00 mg/dL Final      Calcium Calcium   Date Value Ref Range Status   05/03/2022 9.2 8.6 - 10.5 mg/dL Final   05/02/2022 9.2 8.6 - 10.5 mg/dL Final   05/01/2022 8.9 8.6 - 10.5 mg/dL Final      Magnesium No results found for: MG       aspirin, 81 mg, Oral,  Daily  atorvastatin, 40 mg, Oral, Nightly  enoxaparin, 40 mg, Subcutaneous, Daily  furosemide, 40 mg, Oral, Daily  insulin glargine, 15 Units, Subcutaneous, Nightly  insulin lispro, 0-9 Units, Subcutaneous, 4x Daily With Meals & Nightly  metFORMIN, 500 mg, Oral, BID With Meals  metoprolol tartrate, 25 mg, Oral, Q12H  miconazole, 1 application, Topical, Q12H  mupirocin, , Each Nare, BID  nystatin, , Topical, Q8H  pantoprazole, 40 mg, Oral, Q AM  potassium chloride, 20 mEq, Oral, Daily  senna-docusate sodium, 2 tablet, Oral, Nightly           Patient Active Problem List   Diagnosis Code   • Obstructive sleep apnea, adult G47.33   • Essential hypertension, malignant I10   • Cor pulmonale ([facility]) I27.81   • Chest pain R07.9   • Chest pain, unspecified type R07.9   • Coronary artery disease involving native coronary artery of native heart with unstable angina pectoris ([facility]) I25.110   • Abnormal findings on diagnostic imaging of heart and coronary circulation R93.1       Assessment & Plan   - Multi-vessel coronary artery disease - s/p CABG x2 & AVR POD#4 -- Dr. Castellon  - DM II -- non-insulin dependent -- HgbA1: 6.9  - HTN  - HLD--statin therapy  - Asthma  - Sleep apnea with home CPAP  - post op anemia--expected acute blood loss     Looks good this morning -- sitting in chair  Weaned to RA at this time and tolerating. Overnight on home CPAP with 14 minutes of desaturation below 89%. Will need O2 bled into machine at discharge. Will check walking oximetry prior to d/c  Moderate pleural effusion on CXR yesterday. Weight trending up. IV diurese again today  Tachycardic this morning--increase beta blocker  With rash on chest from tape, will add hydrocortisone cream/benadryl  No BM since surgery--will give suppository  Discontinue AV wires  Continue routine care  Possible discharge home with home health tomorrow    DEANNA Cummings  05/03/22  08:09 EDT

## 2022-05-04 ENCOUNTER — READMISSION MANAGEMENT (OUTPATIENT)
Dept: CALL CENTER | Facility: HOSPITAL | Age: 67
End: 2022-05-04

## 2022-05-04 ENCOUNTER — HOME HEALTH ADMISSION (OUTPATIENT)
Dept: HOME HEALTH SERVICES | Facility: HOME HEALTHCARE | Age: 67
End: 2022-05-04

## 2022-05-04 VITALS
HEIGHT: 62 IN | RESPIRATION RATE: 16 BRPM | SYSTOLIC BLOOD PRESSURE: 140 MMHG | WEIGHT: 258.7 LBS | HEART RATE: 90 BPM | BODY MASS INDEX: 47.6 KG/M2 | TEMPERATURE: 97.8 F | OXYGEN SATURATION: 95 % | DIASTOLIC BLOOD PRESSURE: 79 MMHG

## 2022-05-04 LAB
ANION GAP SERPL CALCULATED.3IONS-SCNC: 13 MMOL/L (ref 5–15)
BUN SERPL-MCNC: 21 MG/DL (ref 8–23)
BUN/CREAT SERPL: 26.3 (ref 7–25)
CALCIUM SPEC-SCNC: 9.8 MG/DL (ref 8.6–10.5)
CHLORIDE SERPL-SCNC: 97 MMOL/L (ref 98–107)
CO2 SERPL-SCNC: 25 MMOL/L (ref 22–29)
CREAT SERPL-MCNC: 0.8 MG/DL (ref 0.57–1)
EGFRCR SERPLBLD CKD-EPI 2021: 80.9 ML/MIN/1.73
GLUCOSE BLDC GLUCOMTR-MCNC: 142 MG/DL (ref 70–130)
GLUCOSE BLDC GLUCOMTR-MCNC: 199 MG/DL (ref 70–130)
GLUCOSE SERPL-MCNC: 133 MG/DL (ref 65–99)
POTASSIUM SERPL-SCNC: 4.7 MMOL/L (ref 3.5–5.2)
SODIUM SERPL-SCNC: 135 MMOL/L (ref 136–145)

## 2022-05-04 PROCEDURE — 63710000001 INSULIN LISPRO (HUMAN) PER 5 UNITS

## 2022-05-04 PROCEDURE — 80048 BASIC METABOLIC PNL TOTAL CA: CPT | Performed by: NURSE PRACTITIONER

## 2022-05-04 PROCEDURE — 25010000002 ENOXAPARIN PER 10 MG: Performed by: NURSE PRACTITIONER

## 2022-05-04 PROCEDURE — 82962 GLUCOSE BLOOD TEST: CPT

## 2022-05-04 PROCEDURE — 99238 HOSP IP/OBS DSCHRG MGMT 30/<: CPT | Performed by: NURSE PRACTITIONER

## 2022-05-04 RX ORDER — POTASSIUM CHLORIDE 20 MEQ/1
20 TABLET, EXTENDED RELEASE ORAL DAILY
Qty: 30 TABLET | Refills: 1 | Status: SHIPPED | OUTPATIENT
Start: 2022-05-05 | End: 2022-06-30 | Stop reason: SDUPTHER

## 2022-05-04 RX ORDER — FUROSEMIDE 40 MG/1
40 TABLET ORAL DAILY
Qty: 30 TABLET | Refills: 11 | Status: SHIPPED | OUTPATIENT
Start: 2022-05-05 | End: 2022-06-30

## 2022-05-04 RX ORDER — DIPHENHYDRAMINE HCL 25 MG
25 CAPSULE ORAL EVERY 6 HOURS PRN
Start: 2022-05-04

## 2022-05-04 RX ORDER — HYDROCODONE BITARTRATE AND ACETAMINOPHEN 5; 325 MG/1; MG/1
1 TABLET ORAL EVERY 4 HOURS PRN
Qty: 42 TABLET | Refills: 0 | Status: SHIPPED | OUTPATIENT
Start: 2022-05-04 | End: 2022-05-11

## 2022-05-04 RX ORDER — PEN NEEDLE, DIABETIC 31 GX3/16"
1 NEEDLE, DISPOSABLE MISCELLANEOUS NIGHTLY
Qty: 100 EACH | Refills: 0 | Status: SHIPPED | OUTPATIENT
Start: 2022-05-04 | End: 2022-06-03

## 2022-05-04 RX ORDER — ASPIRIN 81 MG/1
81 TABLET ORAL DAILY
Qty: 30 TABLET | Refills: 11 | Status: SHIPPED | OUTPATIENT
Start: 2022-05-05

## 2022-05-04 RX ORDER — METFORMIN HYDROCHLORIDE 500 MG/1
500 TABLET, EXTENDED RELEASE ORAL 2 TIMES DAILY
Qty: 60 TABLET | Refills: 2 | Status: SHIPPED | OUTPATIENT
Start: 2022-05-04 | End: 2023-01-12

## 2022-05-04 RX ORDER — METOPROLOL TARTRATE 50 MG/1
50 TABLET, FILM COATED ORAL 2 TIMES DAILY
Qty: 60 TABLET | Refills: 2 | Status: SHIPPED | OUTPATIENT
Start: 2022-05-04 | End: 2023-01-12

## 2022-05-04 RX ORDER — ATORVASTATIN CALCIUM 40 MG/1
40 TABLET, FILM COATED ORAL NIGHTLY
Qty: 30 TABLET | Refills: 11 | Status: SHIPPED | OUTPATIENT
Start: 2022-05-04 | End: 2023-03-27

## 2022-05-04 RX ORDER — METOPROLOL TARTRATE 50 MG/1
50 TABLET, FILM COATED ORAL DAILY
Qty: 60 TABLET | Refills: 5 | Status: SHIPPED | OUTPATIENT
Start: 2022-05-04 | End: 2022-05-04 | Stop reason: SDUPTHER

## 2022-05-04 RX ORDER — METOPROLOL TARTRATE 50 MG/1
50 TABLET, FILM COATED ORAL EVERY 12 HOURS SCHEDULED
Status: DISCONTINUED | OUTPATIENT
Start: 2022-05-04 | End: 2022-05-04 | Stop reason: HOSPADM

## 2022-05-04 RX ORDER — DIAPER,BRIEF,INFANT-TODD,DISP
1 EACH MISCELLANEOUS EVERY 12 HOURS SCHEDULED
Qty: 28.4 G | Refills: 0 | Status: SHIPPED | OUTPATIENT
Start: 2022-05-04 | End: 2022-05-11

## 2022-05-04 RX ADMIN — METOPROLOL TARTRATE 50 MG: 50 TABLET, FILM COATED ORAL at 09:04

## 2022-05-04 RX ADMIN — FUROSEMIDE 40 MG: 40 TABLET ORAL at 09:05

## 2022-05-04 RX ADMIN — PANTOPRAZOLE SODIUM 40 MG: 40 TABLET, DELAYED RELEASE ORAL at 07:27

## 2022-05-04 RX ADMIN — POTASSIUM CHLORIDE 20 MEQ: 10 TABLET, EXTENDED RELEASE ORAL at 09:05

## 2022-05-04 RX ADMIN — METFORMIN HYDROCHLORIDE 500 MG: 500 TABLET ORAL at 09:05

## 2022-05-04 RX ADMIN — HYDROCODONE BITARTRATE AND ACETAMINOPHEN 2 TABLET: 5; 325 TABLET ORAL at 12:13

## 2022-05-04 RX ADMIN — MICONAZOLE NITRATE 1 APPLICATION: 2 CREAM TOPICAL at 09:06

## 2022-05-04 RX ADMIN — HYDROCORTISONE 1 APPLICATION: 1 CREAM TOPICAL at 09:06

## 2022-05-04 RX ADMIN — MUPIROCIN 1 APPLICATION: 20 OINTMENT TOPICAL at 09:04

## 2022-05-04 RX ADMIN — NYSTATIN: 100000 POWDER TOPICAL at 07:27

## 2022-05-04 RX ADMIN — ASPIRIN 81 MG: 81 TABLET, COATED ORAL at 09:04

## 2022-05-04 RX ADMIN — INSULIN LISPRO 2 UNITS: 100 INJECTION, SOLUTION INTRAVENOUS; SUBCUTANEOUS at 12:23

## 2022-05-04 RX ADMIN — ENOXAPARIN SODIUM 40 MG: 100 INJECTION SUBCUTANEOUS at 09:05

## 2022-05-04 NOTE — DISCHARGE SUMMARY
Hospital Discharge    Patient Name: Scarlett Ray  Age/Sex: 66 y.o. female  : 1955  MRN: [id number]    Encounter Provider: DEANNA Collins  Referring Provider: Ada Martinez MD  Place of Service: [facility] CARDIOLOGY  Patient Care Team:  Jeffry Espino MD as PCP - General  Jeffry Espino MD as PCP - Family Medicine         Date of Discharge:  2022   Date of Admit: 2022    Discharge Condition: Stable  Discharge Diagnosis:    Chest pain    Chest pain, unspecified type    Coronary artery disease involving native coronary artery of native heart with unstable angina pectoris (HCC)    Abnormal findings on diagnostic imaging of heart and coronary circulation      Hospital Course:   Scarlett Ray is a 66 y.o. female with obesity, sleep apnea, diabetes mellitus type II on metformin and hypertension who presented to the [facility] emergency room on  with complaints of chest pain. These had been intermittent for the past 2 months and usually resolved with as needed aspirin and rest. She had 4 episodes the day before so she presented to the emergency room. Initial EKG showed ST depression in the precordial leads that resolved after several minutes. She was transferred to [facility] for coronary angiography which showed severe multi vessel coronary artery disease involving the distal left main trifurcation. She had normal LV function per LV gram. On  she had CABG x 3 (LIMA to LAD, SVG to OM1, SVG to OM2). She had a fairly unremarkable post op course. She maintained SR. She tolerated beta blocker. She had small pleural effusions and congestion on CXR  and was diuresed. She is now on oral lasix. She had a skin reaction to the tele leads which is also being treated. She walked in the [place] with physical therapy and sats did not drop below 89%. She had an overnight oximetry study that did show need for O2 to be bled into cpap which has been  arranged. She is stable for discharge on POD #5. She has declined need for home health. She will see DEANNA Garcia in one week and Dr. Guerrier in one month.     Objective:  Temp:  [97.4 °F (36.3 °C)-99.3 °F (37.4 °C)] 97.8 °F (36.6 °C)  Heart Rate:  [] 90  Resp:  [16] 16  BP: (118-154)/(67-91) 140/79    Intake/Output Summary (Last 24 hours) at 5/4/2022 1007  Last data filed at 5/4/2022 0755  Gross per 24 hour   Intake 900 ml   Output 601 ml   Net 299 ml     Body mass index is 47.32 kg/m².      05/02/22  0414 05/03/22  0555 05/04/22  0540   Weight: 114 kg (251 lb 11.2 oz) 118 kg (259 lb 3.2 oz) 117 kg (258 lb 11.2 oz)     Weight change: -0.227 kg (-8 oz)    Physical Exam:  Constitutional: She is oriented to person, place, and time. She appears well-developed. She does not appear ill.   HENT:   Head: Normocephalic and atraumatic. Head is without contusion.   Right Ear: Hearing normal. No drainage.   Left Ear: Hearing normal. No drainage.   Nose: No nasal deformity. No epistaxis.   Eyes: Lids are normal. Right eye exhibits no exudate. Left eye exhibits no exudate.  Neck: No JVD present. Carotid bruit is not present. No tracheal deviation present. No thyroid mass and no thyromegaly present.   Cardiovascular: Normal rate, regular rhythm and normal heart sounds.    Pulses:       Posterior tibial pulses are 2+ on the right side, and 2+ on the left side.   Pulmonary/Chest: Effort normal and breath sounds normal. midline incision well approximated. No drainage. Swelling at top of incision.   Abdominal: Soft. Normal appearance and bowel sounds are normal. There is no tenderness.   Musculoskeletal: Normal range of motion.        Right shoulder: She exhibits no deformity.        Left shoulder: She exhibits no deformity.   Neurological: She is alert and oriented to person, place, and time. She has normal strength.   Skin: Skin is warm, dry and intact. No rash noted.   Psychiatric: She has a normal mood and affect.  Her behavior is normal. Thought content normal.   Vitals reviewed      Procedures Performed  Procedure(s):  MIDLINE STERNOTOMY, CORONARY ARTERY BYPASS GRAFTING X3 , UTILIZING THE RIGHT SAPHENOUS VEIN AND LEFT SAM, RAINE, PRP      HEMODYNAMICS:  LV: 130/10  LVEDP: 12-15  AORTIC: 125/75  EF: 65%  WALL MOTION: Normal     CORONARY ANGIOGRAPHY:  LEFT MAIN: Heavily calcified, large caliber left main coronary artery.  There is a calcified stenosis of at least 80% involving the ostium of the LAD, ramus and circumflex arteries.  LAD: The LAD is a large-caliber vessel.  There is a 90% ostial stenosis.  The proximal LAD is otherwise normal.  Just at the takeoff of the first diagonal there is a heavily calcified 99% stenosis.  The remainder of the LAD is medium caliber, wraps the apex and is normal.    RAMUS: The ramus is a medium caliber vessel.  There is a subtotal occlusion of the ostium.  The remainder of the ramus is normal.  CIRCUMFLEX: The circumflex is totally occluded at the ostium.  There are right to left collaterals forming  RCA: The RCA is a large-caliber, dominant vessel.  There are mild irregularities of about 30% of proximal segment.  The mid RCA has mild irregularities, 30%.  The distal vessel has 30 to 40% stenosis at the bifurcation.  The PDA is medium caliber normal.  The PL is medium caliber normal.  The PL and PDA supply collaterals to the distal circumflex and large caliber OM1.           Consults:  Consults     No orders found from 3/30/2022 to 4/29/2022.          Pertinent Test Results:  Results from last 7 days   Lab Units 05/04/22  0252 05/03/22  0251 05/02/22  0247 05/01/22  0308 04/30/22  0310 04/29/22  1844 04/29/22  1523 04/29/22  0254 04/28/22  2220 04/28/22  1017   SODIUM mmol/L 135* 137 137 137 139 140 138   < > 137 139   POTASSIUM mmol/L 4.7 3.9 3.8 4.0 4.3 3.9 4.0   < > 4.1 4.0   CHLORIDE mmol/L 97* 99 100 101 105 105 105   < > 103 103   CO2 mmol/L 25.0 24.0 24.0 24.0 21.0* 21.0* 22.0   < >  21.5* 25.6   BUN mg/dL 21 19 18 11 10 10 10   < > 14 11   CREATININE mg/dL 0.80 0.63 0.69 0.68 0.77 0.77 0.93   < > 0.86 0.87   GLUCOSE mg/dL 133* 144* 146* 143* 120* 143* 142*   < > 178* 163*   CALCIUM mg/dL 9.8 9.2 9.2 8.9 8.8 8.5* 9.4   < > 9.5 9.4   AST (SGOT) U/L  --   --   --   --   --   --   --   --  16 14   ALT (SGPT) U/L  --   --   --   --   --   --   --   --  13 10    < > = values in this interval not displayed.     Results from last 7 days   Lab Units 04/28/22  1017   TROPONIN T ng/mL <0.010     Results from last 7 days   Lab Units 05/03/22  0251 05/02/22  0247 05/01/22  0308 04/30/22 0310 04/29/22 2008 04/29/22  1844 04/29/22  1523   WBC 10*3/mm3 10.30 10.05 11.26* 8.35 12.15* 12.72* 14.36*   HEMOGLOBIN g/dL 11.7* 11.9* 11.6* 11.2* 12.3 12.4 13.7   HEMATOCRIT % 35.3 35.6 36.3 34.4 36.4 35.9 40.9   PLATELETS 10*3/mm3 252 212 204 171 202 195 227     Results from last 7 days   Lab Units 04/30/22  0310 04/29/22  1523 04/28/22  2220   INR  1.15* 1.23* 0.95   APTT seconds  --  31.4 31.9     Results from last 7 days   Lab Units 04/30/22 0310 04/29/22  1844 04/29/22  1523 04/28/22  2220   MAGNESIUM mg/dL 2.7* 2.5* 2.6* 1.9     Results from last 7 days   Lab Units 04/29/22  0254   CHOLESTEROL mg/dL 194   TRIGLYCERIDES mg/dL 253*   HDL CHOL mg/dL 33*     Results from last 7 days   Lab Units 04/28/22  1017   PROBNP pg/mL 344.4           Discharge Medications     Discharge Medications      New Medications      Instructions Start Date   aspirin 81 MG EC tablet   81 mg, Oral, Daily   Start Date: May 5, 2022     atorvastatin 40 MG tablet  Commonly known as: LIPITOR   40 mg, Oral, Nightly      diphenhydrAMINE 25 mg capsule  Commonly known as: BENADRYL   25 mg, Oral, Every 6 Hours PRN      furosemide 40 MG tablet  Commonly known as: LASIX   40 mg, Oral, Daily   Start Date: May 5, 2022     HYDROcodone-acetaminophen 5-325 MG per tablet  Commonly known as: NORCO   1 tablet, Oral, Every 4 Hours PRN      hydrocortisone 1 %  cream   1 application, Topical, Every 12 Hours Scheduled      Insulin Glargine 100 UNIT/ML injection pen  Commonly known as: LANTUS SOLOSTAR   15 Units, Subcutaneous, Nightly         Changes to Medications      Instructions Start Date   metFORMIN  MG 24 hr tablet  Commonly known as: GLUCOPHAGE-XR  What changed: when to take this   500 mg, Oral, 2 Times Daily      metoprolol tartrate 50 MG tablet  Commonly known as: LOPRESSOR  What changed:   · medication strength  · how much to take   50 mg, Oral, Daily         Continue These Medications      Instructions Start Date   albuterol sulfate  (90 Base) MCG/ACT inhaler  Commonly known as: PROVENTIL HFA;VENTOLIN HFA;PROAIR HFA   2 puffs, Inhalation, Every 6 Hours PRN      Biotin 1000 MCG tablet   1,000 mcg, Oral, Daily      calcium carbonate 600 MG tablet  Commonly known as: OS-NUSRAT   600 mg, Oral, Daily      cholecalciferol 25 MCG (1000 UT) tablet  Commonly known as: VITAMIN D3   2,000 Units, Oral, Daily         Stop These Medications    simvastatin 20 MG tablet  Commonly known as: ZOCOR     spironolactone 25 MG tablet  Commonly known as: ALDACTONE            Discharge Diet:    Dietary Orders (From admission, onward)     Start     Ordered    04/30/22 1312  Diet Regular; Cardiac, Consistent Carbohydrate  Diet Effective Now        Question Answer Comment   Diet Texture / Consistency Regular    Common Modifiers Cardiac    Common Modifiers Consistent Carbohydrate        04/30/22 1311                Activity at Discharge:   sternal precautions per CTS    Discharge disposition: home     Discharge Instructions and Follow ups:  Future Appointments   Date Time Provider Department Center   5/25/2022 10:45 AM Chrissy Tran APRN [facility]     Additional Instructions for the Follow-ups that You Need to Schedule     Ambulatory Referral to Cardiac Rehab   As directed      Ambulatory Referral to Home Health (Hospital)   As directed      Face to Face Visit Date:  5/4/2022    Follow-up provider for Plan of Care?: I will be treating the patient on an ongoing basis.  Please send me the Plan of Care for signature.    Follow-up provider: GABRIEL PUENTE [[id number]]    Reason/Clinical Findings: post op open heart    Describe mobility limitations that make leaving home difficult: post-operative weakness    Nursing/Therapeutic Services Requested: Skilled Nursing Physical Therapy    Skilled nursing orders: Post CABG care    PT orders: Therapeutic exercise Strengthening    Frequency: 1 Week 1         Call MD With Problems / Concerns   As directed      Instructions:  Call office at [phone] for any drainage, increased redness, or fever over 100.5    Order Comments: Instructions:  Call office at [phone] for any drainage, increased redness, or fever over 100.5          Discharge Follow-up with PCP   As directed       Currently Documented PCP:    Jeffry Espino MD    PCP Phone Number:    [phone]     Follow Up Details: in 1 week         Discharge Follow-up with Specialty: Cardiologist APRN/PA; 1 Week   As directed      Specialty: Cardiologist APRN/PA    Follow Up: 1 Week    Follow Up Details: bring all prescription bottles to appointment, call for appointment         Discharge Follow-up with Specified Provider: Cardiologist; 1 Month   As directed      To: Cardiologist    Follow Up: 1 Month    Follow Up Details: call for appointment, bring all medication bottles to appointment         Discharge Follow-up with Specified Provider: DEANNA Robles 5/25/22 at 10:45 am   As directed      To: DEANNA Robles 5/25/22 at 10:45 am    Follow Up Details: bring all current medications to appointment            Contact information for follow-up providers     [facility] .    Specialty: Cardiac Rehabilitation  Contact information:  [address] [place] [address] [phone]           Jeffry Espino MD .    Specialty: Family  Medicine  Why: in 1 week  Contact information:  [address] [facility] [address]  [phone]                   Contact information for after-discharge care     Home Medical Care     Psychiatric .    Service: Home Health Services  Contact information:  [address] [facility] [address]  [facility] [address] [phone]                             Test Results Pending at Discharge: none     Yadira Resendiz, DEANNA  05/04/22  10:07 EDT    Time: Discharge 20 min

## 2022-05-04 NOTE — CASE MANAGEMENT/SOCIAL WORK
Continued Stay Note  [facility]     Patient Name: Scarlett Ray  MRN: [id number]  Today's Date: 5/4/2022    Admit Date: 4/28/2022     Discharge Plan     Row Name 05/04/22 1036       Plan    Plan Home w/ daughter, RN assistance.  [facility] provided rolling walker.  [facility] provided nocturnal oxygen to be bled into CPAP    Plan Comments Pt has decided against home health services.  Pt daughter is a RN at [employer] and will assist Pt at home along with Pt  Dg.  Pt chose [place] as rolling walker provider.  Pt is current with [facility] for her current CPAP and requested they provide the nocturnal oxygen to be bled through her CPAP.  [facility] called and spoke with [facility] [place] [phone] at 10:08 AM to advise of nocturnal oxygen order.  CCP faxed the clinicals to [facility] at 10:16 AM.  DEANNA Davila notified that Pt declined home health services.  CCP updated [facility] that Pt is now declining HH at d/c.  CCP following......Heather BARRAZA/ANDRÉS CM               Discharge Codes    No documentation.               Expected Discharge Date and Time     Expected Discharge Date Expected Discharge Time    May 4, 2022             Heather Marquez RN

## 2022-05-04 NOTE — CASE MANAGEMENT/SOCIAL WORK
Case Management Discharge Note      Final Note: Pt discharging home with daughter, Carol Garvin, RN transporting.  Pt chose to forgo home health services advising Carol is an RN and will be assessing her at home.  [facility] provided rolling walker prior to d/c.  [facility] will supply the nocturnal oxygen to be bled into Pt CPAP......Heather BARRAZA/ANDRÉS GARCIA    Provided Post Acute Provider List?: Yes  Post Acute Provider List: Home Health  Delivered To: Patient  Method of Delivery: In person    Selected Continued Care - Admitted Since 4/28/2022     Destination    No services have been selected for the patient.              Durable Medical Equipment Coordination complete.    Service Provider Selected Services Address Phone Fax Patient Preferred    [facility]  Durable Medical Equipment [address], [facility] [id number] [phone] --       Internal Comment last updated by Heather Marquez RN 5/4/2022 1035    ROLLING WALKER.  Heather Marquez RN                  [facility]  Durable Medical Equipment [address], [facility] [address] [phone] --       Internal Comment last updated by Heather Marquez RN 5/4/2022 1035    Nocturnal oxygen to be bled through CPAP.  Clinicals faxed to [facility] at 10:16 AM.  5/4/2022 Heather aMrquez RN                        Dialysis/Infusion    No services have been selected for the patient.              Home Medical Care    No services have been selected for the patient.              Therapy    No services have been selected for the patient.              Community Resources    No services have been selected for the patient.              Community & DME    No services have been selected for the patient.                       Final Discharge Disposition Code: 01 - home or self-care

## 2022-05-04 NOTE — PROGRESS NOTES
" LOS: 6 days   Patient Care Team:  Jeffry Espino MD as PCP - General  Jeffry Espino MD as PCP - Family Medicine    Chief Complaint: post op    Subjective:  Symptoms:  No shortness of breath, cough or chest pain.    Diet:  Adequate intake.  No nausea or vomiting.    Activity level: Impaired due to weakness.    Pain:  She complains of pain that is mild.  Pain is well controlled.      Rash slightly improved this morning    Vital Signs  Temp:  [97.4 °F (36.3 °C)-99.3 °F (37.4 °C)] 97.8 °F (36.6 °C)  Heart Rate:  [] 90  Resp:  [16] 16  BP: (118-154)/(67-91) 140/79  Body mass index is 47.32 kg/m².    Intake/Output Summary (Last 24 hours) at 5/4/2022 0753  Last data filed at 5/4/2022 0654  Gross per 24 hour   Intake 660 ml   Output 701 ml   Net -41 ml     No intake/output data recorded.          05/02/22  0414 05/03/22  0555 05/04/22  0540   Weight: 114 kg (251 lb 11.2 oz) 118 kg (259 lb 3.2 oz) 117 kg (258 lb 11.2 oz)         Objective:  General Appearance:  Comfortable and in no acute distress.    Vital signs: (most recent): Blood pressure 140/79, pulse 90, temperature 97.8 °F (36.6 °C), temperature source Oral, resp. rate 16, height 157.5 cm (62\"), weight 117 kg (258 lb 11.2 oz), SpO2 95 %, not currently breastfeeding.  Vital signs are normal.  No fever.    Output: Producing urine and no stool output.    Lungs:  Normal effort and normal respiratory rate.  There are decreased breath sounds.    Heart: Normal rate.  Regular rhythm.  (SR on tele monitor)  Abdomen: Abdomen is soft.  Bowel sounds are normal.     Extremities: There is dependent edema.    Pulses: Distal pulses are intact.    Neurological: Patient is alert and oriented to person, place and time.    Skin:  Warm and dry.  There is a rash (upper right chest).  (Sternal incision clean and dry with echymosis noted. Swelling at the top of incision)      Results Review:        WBC WBC   Date Value Ref Range Status   05/03/2022 10.30 3.40 - 10.80 10*3/mm3 " Final   05/02/2022 10.05 3.40 - 10.80 10*3/mm3 Final      HGB Hemoglobin   Date Value Ref Range Status   05/03/2022 11.7 (L) 12.0 - 15.9 g/dL Final   05/02/2022 11.9 (L) 12.0 - 15.9 g/dL Final      HCT Hematocrit   Date Value Ref Range Status   05/03/2022 35.3 34.0 - 46.6 % Final   05/02/2022 35.6 34.0 - 46.6 % Final      Platelets Platelets   Date Value Ref Range Status   05/03/2022 252 140 - 450 10*3/mm3 Final   05/02/2022 212 140 - 450 10*3/mm3 Final        PT/INR:  No results found for: PROTIME/No results found for: INR    Sodium Sodium   Date Value Ref Range Status   05/04/2022 135 (L) 136 - 145 mmol/L Final   05/03/2022 137 136 - 145 mmol/L Final   05/02/2022 137 136 - 145 mmol/L Final      Potassium Potassium   Date Value Ref Range Status   05/04/2022 4.7 3.5 - 5.2 mmol/L Final   05/03/2022 3.9 3.5 - 5.2 mmol/L Final   05/02/2022 3.8 3.5 - 5.2 mmol/L Final      Chloride Chloride   Date Value Ref Range Status   05/04/2022 97 (L) 98 - 107 mmol/L Final   05/03/2022 99 98 - 107 mmol/L Final   05/02/2022 100 98 - 107 mmol/L Final      Bicarbonate CO2   Date Value Ref Range Status   05/04/2022 25.0 22.0 - 29.0 mmol/L Final   05/03/2022 24.0 22.0 - 29.0 mmol/L Final   05/02/2022 24.0 22.0 - 29.0 mmol/L Final      BUN BUN   Date Value Ref Range Status   05/04/2022 21 8 - 23 mg/dL Final   05/03/2022 19 8 - 23 mg/dL Final   05/02/2022 18 8 - 23 mg/dL Final      Creatinine Creatinine   Date Value Ref Range Status   05/04/2022 0.80 0.57 - 1.00 mg/dL Final   05/03/2022 0.63 0.57 - 1.00 mg/dL Final   05/02/2022 0.69 0.57 - 1.00 mg/dL Final      Calcium Calcium   Date Value Ref Range Status   05/04/2022 9.8 8.6 - 10.5 mg/dL Final   05/03/2022 9.2 8.6 - 10.5 mg/dL Final   05/02/2022 9.2 8.6 - 10.5 mg/dL Final      Magnesium No results found for: MG       aspirin, 81 mg, Oral, Daily  atorvastatin, 40 mg, Oral, Nightly  bisacodyl, 10 mg, Rectal, Once  enoxaparin, 40 mg, Subcutaneous, Daily  furosemide, 40 mg, Oral,  Daily  hydrocortisone, 1 application, Topical, Q12H  insulin glargine, 15 Units, Subcutaneous, Nightly  insulin lispro, 0-9 Units, Subcutaneous, 4x Daily With Meals & Nightly  metFORMIN, 500 mg, Oral, BID With Meals  metoprolol tartrate, 37.5 mg, Oral, Q12H  miconazole, 1 application, Topical, Q12H  mupirocin, , Each Nare, BID  nystatin, , Topical, Q8H  pantoprazole, 40 mg, Oral, Q AM  potassium chloride, 20 mEq, Oral, Daily  senna-docusate sodium, 2 tablet, Oral, Nightly           Patient Active Problem List   Diagnosis Code   • Obstructive sleep apnea, adult G47.33   • Essential hypertension, malignant I10   • Cor pulmonale ([facility]) I27.81   • Chest pain R07.9   • Chest pain, unspecified type R07.9   • Coronary artery disease involving native coronary artery of native heart with unstable angina pectoris ([facility]) I25.110   • Abnormal findings on diagnostic imaging of heart and coronary circulation R93.1       Assessment & Plan   - Multi-vessel coronary artery disease - s/p CABG x2 & AVR POD#5 [place]  - DM II -- non-insulin dependent -- HgbA1: 6.9  - HTN  - HLD--statin therapy  - Asthma  - Sleep apnea with home CPAP  - post op anemia--expected acute blood loss     Looks good this morning -- sitting in chair  Weaned to RA at this time and tolerating. Ambulated in the velasquez without desaturation below 89%. Overnight on home CPAP with 14 minutes of desaturation below 89%. Will need O2 bled into machine at discharge.  Increase beta blocker  With rash on chest from tape. Looks better will add hydrocortisone cream/benadryl  Okay for discharge home with home health from our standpoint  Pain RX/home health order placed  Follow up in our office on 5/25/22 at 10:45    DEANNA Cummings  05/04/22  07:53 EDT

## 2022-05-04 NOTE — CASE MANAGEMENT/SOCIAL WORK
Continued Stay Note  [facility]     Patient Name: Scareltt Ray  MRN: [id number]  Today's Date: 5/4/2022    Admit Date: 4/28/2022     Discharge Plan     Row Name 05/04/22 1207       Plan    Plan Comments Certificate of Medical Necessity CMS-484-Oxygen form completed, signed by DEANNA Davila and faxed back to [facility] today at 12:01 PM.....Heather BARRAZA/ANDRÉS GARCIA    Row Name 05/04/22 1036       Plan    Plan Home w/ daughter, RN assistance.  [facility] provided rolling walker.  [facility] to be bled into CPAP    Plan Comments Pt has decided against home health services.  Pt daughter is a RN at [employer] and will assist Pt at home along with Pt  Dg.  Pt chose [facility] as rolling walker provider.  Pt is current with [facility] for her current CPAP and requested they provide the nocturnal oxygen to be bled through her CPAP.  [facility] called and spoke with [facility] [phone] at 10:08 AM to advise of nocturnal oxygen order.  CCP faxed the clinicals to [facility] at 10:16 AM.  DEANNA Davial notified that Pt declined home health services.  [facility] updated [facility] that Pt is now declining HH at d/c.  CCP following......Heather BARRAZA/ANDRÉS GARCIA               Discharge Codes    No documentation.               Expected Discharge Date and Time     Expected Discharge Date Expected Discharge Time    May 4, 2022             Heather Marquez RN

## 2022-05-04 NOTE — PLAN OF CARE
Goal Outcome Evaluation:  Plan of Care Reviewed With: patient        Progress: improving  Outcome Evaluation: Discharging home today.

## 2022-05-05 NOTE — OUTREACH NOTE
Prep Survey    Flowsheet Row Responses   Religion facility patient discharged from? [place]   Is LACE score < 7 ? No   Emergency Room discharge w/ pulse ox? No   Eligibility Readm Mgmt   Discharge diagnosis Coronary artery disease involving native coronary artery of native heart with unstable angina pectoris    Does the patient have one of the following disease processes/diagnoses(primary or secondary)? General Surgery   Does the patient have Home health ordered? No   Is there a DME ordered? Yes   What DME was ordered? RW per [facility] per [facility]    Medication alerts for this patient ASA, Lasix    Prep survey completed? Yes          RUDDY LITTLE - Registered Nurse         Strong peripheral pulses

## 2022-05-09 ENCOUNTER — READMISSION MANAGEMENT (OUTPATIENT)
Dept: CALL CENTER | Facility: HOSPITAL | Age: 67
End: 2022-05-09

## 2022-05-09 ENCOUNTER — TELEPHONE (OUTPATIENT)
Dept: CARDIAC REHAB | Facility: HOSPITAL | Age: 67
End: 2022-05-09

## 2022-05-09 NOTE — OUTREACH NOTE
General Surgery Week 1 Survey    Flowsheet Row Responses   [facility] patient discharged from? [place]   Does the patient have one of the following disease processes/diagnoses(primary or secondary)? General Surgery   Week 1 attempt successful? Yes   Call start time 1330   Call end time 1335   Discharge diagnosis Coronary artery disease involving native coronary artery of native heart with unstable angina pectoris    Is patient permission given to speak with other caregiver? Yes   Person spoke with today (if not patient) and relationship daughter Carol Harris reviewed with patient/caregiver? Yes   Is the patient having any side effects they believe may be caused by any medication additions or changes? No   Does the patient have all medications related to this admission filled (includes all antibiotics, pain medications, etc.) Yes   Is the patient taking all medications as directed (includes completed medication regime)? Yes   Does the patient have a follow up appointment scheduled with their surgeon? Yes   Has the patient kept scheduled appointments due by today? N/A   Has home health visited the patient within 72 hours of discharge? N/A   What DME was ordered? RW per [facility] per [facility]    Has all DME been delivered? Yes   Psychosocial issues? No   Did the patient receive a copy of their discharge instructions? Yes   Nursing interventions Reviewed instructions with patient   What is the patient's perception of their health status since discharge? Improving   Nursing interventions Nurse provided patient education   Is the patient /caregiver able to teach back basic post-op care? Keep incision areas clean,dry and protected   Is the patient/caregiver able to teach back signs and symptoms of incisional infection? Increased redness, swelling or pain at the incisonal site, Fever   Is the patient/caregiver able to teach back steps to recovery at home? Set small, achievable goals for return to baseline  health, Make a list of questions for surgeon's appointment   If the patient is a current smoker, are they able to teach back resources for cessation? Not a smoker   Is the patient/caregiver able to teach back the hierarchy of who to call/visit for symptoms/problems? PCP, Specialist, Home health nurse, Urgent Care, ED, 911 Yes   Week 1 call completed? Yes   Wrap up additional comments Per daughter patient is doing well. No questions or concerns.           TANA LITTLE - Registered Nurse   Advancement Flap (Single) Text: The defect edges were debeveled with a #15 scalpel blade.  Given the location of the defect and the proximity to free margins a single advancement flap was deemed most appropriate.  Using a sterile surgical marker, an appropriate advancement flap was drawn incorporating the defect and placing the expected incisions within the relaxed skin tension lines where possible.    The area thus outlined was incised deep to adipose tissue with a #15 scalpel blade.  The skin margins were undermined to an appropriate distance in all directions utilizing iris scissors.

## 2022-05-11 ENCOUNTER — TELEPHONE (OUTPATIENT)
Dept: CARDIAC REHAB | Facility: HOSPITAL | Age: 67
End: 2022-05-11

## 2022-05-11 ENCOUNTER — OFFICE VISIT (OUTPATIENT)
Dept: CARDIOLOGY | Facility: CLINIC | Age: 67
End: 2022-05-11

## 2022-05-11 VITALS
WEIGHT: 251 LBS | HEART RATE: 91 BPM | OXYGEN SATURATION: 96 % | SYSTOLIC BLOOD PRESSURE: 122 MMHG | BODY MASS INDEX: 46.19 KG/M2 | DIASTOLIC BLOOD PRESSURE: 72 MMHG | RESPIRATION RATE: 16 BRPM | HEIGHT: 62 IN

## 2022-05-11 DIAGNOSIS — I10 ESSENTIAL HYPERTENSION, MALIGNANT: ICD-10-CM

## 2022-05-11 DIAGNOSIS — I27.81 COR PULMONALE: ICD-10-CM

## 2022-05-11 DIAGNOSIS — G47.33 OBSTRUCTIVE SLEEP APNEA, ADULT: ICD-10-CM

## 2022-05-11 DIAGNOSIS — I25.110 CORONARY ARTERY DISEASE INVOLVING NATIVE CORONARY ARTERY OF NATIVE HEART WITH UNSTABLE ANGINA PECTORIS: Primary | ICD-10-CM

## 2022-05-11 PROCEDURE — 93000 ELECTROCARDIOGRAM COMPLETE: CPT | Performed by: NURSE PRACTITIONER

## 2022-05-11 PROCEDURE — 99214 OFFICE O/P EST MOD 30 MIN: CPT | Performed by: NURSE PRACTITIONER

## 2022-05-11 NOTE — PROGRESS NOTES
Date of Office Visit: 2022  Encounter Provider: DEANNA Garcia  Place of Service: [facility] CARDIOLOGY  Patient Name: Scarlett Ray  :1955  Primary Cardiologist: Dr. Murrell    CC:  Hospital follow up    Dear Dr. Espino    HPI: Scarlett Ray is a pleasant 66 y.o. female who presents 2022 for cardiac follow up.  She is a new patient to me and I reviewed her past medical records.  She originally saw Dr. Martinez for a cardiac catheterization.  She was then seen by Dr. Guerrier.  She lives in [place] and will follow in the [place] office.    Scarlett Ray is a 66 y.o. female with obesity, sleep apnea, diabetes mellitus type II on metformin and hypertension who presented to the [facility] emergency room on  with complaints of chest pain. These had been intermittent for the past 2 months and usually resolved with as needed aspirin and rest. She had 4 episodes the day before so she presented to the emergency room. Initial EKG showed ST depression in the precordial leads that resolved after several minutes. She was transferred to [facility] for coronary angiography which showed severe multi vessel coronary artery disease involving the distal left main trifurcation. She had normal LV function per LV gram. On  she had CABG x 3 (LIMA to LAD, SVG to OM1, SVG to OM2). She had a fairly unremarkable post op course. She maintained SR. She tolerated beta blocker. She had small pleural effusions and congestion on CXR  and was diuresed. She is now on oral lasix. She had a skin reaction to the tele leads which is also being treated. She walked in the [place] with physical therapy and sats did not drop below 89%. She had an overnight oximetry study that did show need for O2 to be bled into cpap which has been arranged. She is stable for discharge on POD #5. She has declined need for home health or cardiac rehab.     Cardiac cath -  Conclusion  · Severe multivessel coronary artery disease involving the distal left main trifurcation, mid LAD, ostial ramus, ostial circumflex.  · Normal LV filling pressures and function  · Recommendations: Coronary artery bypass graft surgery.     She presents today with her .  She states all in all she is doing fairly well.  She has some incisional discomfort but denies any angina.  She still has fatigue.  She denies any palpitations, shortness of breath, lower extremity edema.  She denies any dizziness or lightheadedness, syncope or presyncopal episodes.  She does have on MIHIR hose today.  She does have some swelling in her right leg.  This was the leg that her vein was harvested.  Her incision is well approximated with no drainage or redness.  Her right wrist site has a fading bruise.  She has a strong pulse and brisk capillary refill.  Her blood pressure is well controlled.  She states she is taking her medications as directed.  She was started on a CPAP in March and states they added oxygen to it while she was recently in the hospital.  She states this is helped quite a bit.  She does not have home health because she felt like it was not needed.  I did discuss cardiac rehab and she states she declined that because she felt like she could do exercises at home.  Past Medical History:   Diagnosis Date   • Asthma    • Elevated cholesterol    • H/O seasonal allergies    • HLD (hyperlipidemia)    • Hypertension    • Obesity    • NONI (obstructive sleep apnea)     compliant w/ CPAP   • Type 2 diabetes mellitus (HCC)     on metformin at home       Past Surgical History:   Procedure Laterality Date   • BREAST BIOPSY Left    • CARDIAC CATHETERIZATION N/A 4/28/2022    Procedure: Left Heart Cath;  Surgeon: Ada Martinez MD;  Location: [facility] CATH INVASIVE LOCATION;  Service: Cardiology;  Laterality: N/A;   • CARDIAC CATHETERIZATION N/A 4/28/2022    Procedure: Left ventriculography;  Surgeon: Ada Martinez MD;   Location: [facility] CATH INVASIVE LOCATION;  Service: Cardiology;  Laterality: N/A;   • CARDIAC CATHETERIZATION N/A 2022    Procedure: Coronary angiography;  Surgeon: Ada Martinez MD;  Location: [facility] CATH INVASIVE LOCATION;  Service: Cardiology;  Laterality: N/A;   •  SECTION     • CORONARY ARTERY BYPASS GRAFT N/A 2022    Procedure: MIDLINE STERNOTOMY, CORONARY ARTERY BYPASS GRAFTING X3 , UTILIZING THE RIGHT SAPHENOUS VEIN AND LEFT SAM, RAINE, PRP;  Surgeon: Rodolfo Castellon MD;  Location: [facility] CVOR;  Service: Cardiothoracic;  Laterality: N/A;   • CORONARY ARTERY BYPASS GRAFT  2022    Procedure: ;  Surgeon: Rodolfo Castellon MD;  Location: [facility];  Service: Cardiothoracic;;   • DILATATION AND CURETTAGE      X2   • UMBILICAL HERNIA REPAIR         Social History     Socioeconomic History   • Marital status:    Tobacco Use   • Smoking status: Never Smoker   • Smokeless tobacco: Never Used   • Tobacco comment: no caffiene   Vaping Use   • Vaping Use: Never used   Substance and Sexual Activity   • Alcohol use: No   • Drug use: No   • Sexual activity: Defer       History reviewed. No pertinent family history.    The following portion of the patient's history were reviewed and updated as appropriate: past medical history, past surgical history, past social history, past family history, allergies, current medications, and problem list.    Review of Systems   Constitutional: Positive for malaise/fatigue. Negative for diaphoresis and fever.   HENT: Negative for congestion, hearing loss, hoarse voice, nosebleeds and sore throat.    Eyes: Negative for photophobia, vision loss in left eye, vision loss in right eye and visual disturbance.   Cardiovascular: Positive for leg swelling. Negative for chest pain, dyspnea on exertion, irregular heartbeat, near-syncope, orthopnea, palpitations, paroxysmal nocturnal dyspnea and syncope.   Respiratory: Negative for cough, hemoptysis,  shortness of breath, sleep disturbances due to breathing, snoring, sputum production and wheezing.    Endocrine: Negative for cold intolerance, heat intolerance, polydipsia, polyphagia and polyuria.   Hematologic/Lymphatic: Negative for bleeding problem. Does not bruise/bleed easily.   Skin: Negative for color change, dry skin, poor wound healing, rash and suspicious lesions.   Musculoskeletal: Negative for arthritis, back pain, falls, gout, joint pain, joint swelling, muscle cramps, muscle weakness and myalgias.   Gastrointestinal: Negative for bloating, abdominal pain, constipation, diarrhea, dysphagia, melena, nausea and vomiting.   Neurological: Negative for excessive daytime sleepiness, dizziness, headaches, light-headedness, loss of balance, numbness, paresthesias, seizures, vertigo and weakness.   Psychiatric/Behavioral: Negative for depression, memory loss and substance abuse. The patient is not nervous/anxious.        Allergies   Allergen Reactions   • Ace Inhibitors Anaphylaxis   • Latex Hives         Current Outpatient Medications:   •  albuterol sulfate  (90 Base) MCG/ACT inhaler, Inhale 2 puffs Every 6 (Six) Hours As Needed for Shortness of Air or Wheezing., Disp: , Rfl: 0  •  aspirin 81 MG EC tablet, Take 1 tablet by mouth Daily., Disp: 30 tablet, Rfl: 11  •  atorvastatin (LIPITOR) 40 MG tablet, Take 1 tablet by mouth Every Night., Disp: 30 tablet, Rfl: 11  •  Biotin 1000 MCG tablet, Take 1,000 mcg by mouth Daily., Disp: , Rfl:   •  calcium carbonate (OS-NUSRAT) 600 MG tablet, Take 600 mg by mouth Daily., Disp: , Rfl:   •  cholecalciferol (VITAMIN D3) 25 MCG (1000 UT) tablet, Take 2,000 Units by mouth Daily., Disp: , Rfl:   •  diphenhydrAMINE (BENADRYL) 25 mg capsule, Take 1 capsule by mouth Every 6 (Six) Hours As Needed for Itching., Disp: , Rfl:   •  furosemide (LASIX) 40 MG tablet, Take 1 tablet by mouth Daily., Disp: 30 tablet, Rfl: 11  •  HYDROcodone-acetaminophen (NORCO) 5-325 MG per tablet,  "Take 1 tablet by mouth Every 4 (Four) Hours As Needed for Moderate Pain  for up to 7 days., Disp: 42 tablet, Rfl: 0  •  hydrocortisone 1 % cream, Apply 1 application topically to the appropriate area as directed Every 12 (Twelve) Hours for 7 days., Disp: 28.4 g, Rfl: 0  •  Insulin Pen Needle (Unifine Pentips) 31G X 5 MM misc, Use nightly., Disp: 100 each, Rfl: 0  •  metFORMIN ER (GLUCOPHAGE-XR) 500 MG 24 hr tablet, Take 1 tablet by mouth 2 (Two) Times a Day for 90 days., Disp: 60 tablet, Rfl: 2  •  metoprolol tartrate (LOPRESSOR) 50 MG tablet, Take 1 tablet by mouth 2 (Two) Times a Day for 90 days., Disp: 60 tablet, Rfl: 2  •  potassium chloride (K-DUR,KLOR-CON) 20 MEQ CR tablet, Take 1 tablet by mouth Daily for 60 days., Disp: 30 tablet, Rfl: 1        Objective:     Vitals:    05/11/22 1419   BP: 122/72   Pulse: 91   Resp: 16   SpO2: 96%   Weight: 114 kg (251 lb)   Height: 157.5 cm (62\")     Body mass index is 45.91 kg/m².      Vitals reviewed.   Constitutional:       General: Not in acute distress.     Appearance: Well-developed and not in distress. Morbidly obese.   Eyes:      General:         Right eye: No discharge.         Left eye: No discharge.      Conjunctiva/sclera: Conjunctivae normal.   HENT:      Head: Normocephalic and atraumatic.      Right Ear: External ear normal.      Left Ear: External ear normal.      Nose: Nose normal.   Neck:      Thyroid: No thyromegaly.      Vascular: No JVD.      Trachea: No tracheal deviation.      Lymphadenopathy: No cervical adenopathy.   Pulmonary:      Effort: Pulmonary effort is normal. No respiratory distress.      Breath sounds: Normal breath sounds. No wheezing. No rales.   Chest:      Chest wall: Not tender to palpatation.   Cardiovascular:      Normal rate. Regular rhythm.      No gallop.   Pulses:     Intact distal pulses.   Edema:     Peripheral edema present.     Pretibial: 1+ edema of the left pretibial area.     Ankle: 1+ edema of the left ankle.     Feet: " 1+ edema of the left foot.  Abdominal:      General: There is no distension.      Palpations: Abdomen is soft.      Tenderness: There is no abdominal tenderness.   Musculoskeletal: Normal range of motion.         General: No tenderness or deformity.      Cervical back: Normal range of motion and neck supple. Skin:     General: Skin is warm and dry.      Findings: No erythema or rash.   Neurological:      Mental Status: Alert and oriented to person, place, and time.      Coordination: Coordination normal.   Psychiatric:         Attention and Perception: Attention normal.         Mood and Affect: Mood normal.         Speech: Speech normal.         Behavior: Behavior normal. Behavior is cooperative.         Thought Content: Thought content normal.         Cognition and Memory: Cognition normal.         Judgment: Judgment normal.               ECG 12 Lead    Date/Time: 5/11/2022 2:54 PM  Performed by: Jeanette Quintero APRN  Authorized by: Jeanette Quintero APRN   Comparison: compared with previous ECG from 5/1/2022  Similar to previous ECG  Rhythm: sinus rhythm  Rate: normal  Conduction: conduction normal  ST Segments: ST segments normal  T inversion: aVL, V2, V3 and V4  T flattening: V5 and V6  QRS axis: normal  Other findings: non-specific ST-T wave changes    Clinical impression: abnormal EKG              Assessment:       Diagnosis Plan   1. Coronary artery disease involving native coronary artery of native heart with unstable angina pectoris (HCC)     2. Cor pulmonale (HCC)     3. Essential hypertension, malignant     4. Obstructive sleep apnea, adult            Plan:          1.  Multivessel coronary artery status post CABG x3: LIMA to LAD, SVG to OM1, SVG to OM 2 on 4/29/2022.  States she is doing well.  She is trying to walk but has not worked up to 30 minutes a day.  She is using her I-S.  She declined home health and cardiac rehab.  2.  Type 2 diabetes mellitus  3.  Hypercholesterolemia-continue lipid-lowering  therapy.  She is currently on 40 mg daily.  4.  Essential hypertension-controlled  5.  Morbidly obese with obstructive sleep apnea-she would benefit from a weight loss program.  She is using her CPAP and states they added oxygen on it while in the hospital.  She states this has helped.    RTO in 4 weeks with Y    As always, it has been a pleasure to participate in your patient's care. Thank you.       Sincerely,       DEANNA Garcia      Current Outpatient Medications:   •  albuterol sulfate  (90 Base) MCG/ACT inhaler, Inhale 2 puffs Every 6 (Six) Hours As Needed for Shortness of Air or Wheezing., Disp: , Rfl: 0  •  aspirin 81 MG EC tablet, Take 1 tablet by mouth Daily., Disp: 30 tablet, Rfl: 11  •  atorvastatin (LIPITOR) 40 MG tablet, Take 1 tablet by mouth Every Night., Disp: 30 tablet, Rfl: 11  •  Biotin 1000 MCG tablet, Take 1,000 mcg by mouth Daily., Disp: , Rfl:   •  calcium carbonate (OS-NUSRAT) 600 MG tablet, Take 600 mg by mouth Daily., Disp: , Rfl:   •  cholecalciferol (VITAMIN D3) 25 MCG (1000 UT) tablet, Take 2,000 Units by mouth Daily., Disp: , Rfl:   •  diphenhydrAMINE (BENADRYL) 25 mg capsule, Take 1 capsule by mouth Every 6 (Six) Hours As Needed for Itching., Disp: , Rfl:   •  furosemide (LASIX) 40 MG tablet, Take 1 tablet by mouth Daily., Disp: 30 tablet, Rfl: 11  •  HYDROcodone-acetaminophen (NORCO) 5-325 MG per tablet, Take 1 tablet by mouth Every 4 (Four) Hours As Needed for Moderate Pain  for up to 7 days., Disp: 42 tablet, Rfl: 0  •  hydrocortisone 1 % cream, Apply 1 application topically to the appropriate area as directed Every 12 (Twelve) Hours for 7 days., Disp: 28.4 g, Rfl: 0  •  Insulin Pen Needle (Unifine Pentips) 31G X 5 MM misc, Use nightly., Disp: 100 each, Rfl: 0  •  metFORMIN ER (GLUCOPHAGE-XR) 500 MG 24 hr tablet, Take 1 tablet by mouth 2 (Two) Times a Day for 90 days., Disp: 60 tablet, Rfl: 2  •  metoprolol tartrate (LOPRESSOR) 50 MG tablet, Take 1 tablet by mouth 2 (Two)  Times a Day for 90 days., Disp: 60 tablet, Rfl: 2  •  potassium chloride (K-DUR,KLOR-CON) 20 MEQ CR tablet, Take 1 tablet by mouth Daily for 60 days., Disp: 30 tablet, Rfl: 1      Dictated utilizing Dragon dictation

## 2022-05-23 ENCOUNTER — TELEPHONE (OUTPATIENT)
Dept: CARDIOLOGY | Facility: CLINIC | Age: 67
End: 2022-05-23

## 2022-05-25 ENCOUNTER — OFFICE VISIT (OUTPATIENT)
Dept: CARDIAC SURGERY | Facility: CLINIC | Age: 67
End: 2022-05-25

## 2022-05-25 VITALS
TEMPERATURE: 97.8 F | OXYGEN SATURATION: 91 % | HEART RATE: 86 BPM | RESPIRATION RATE: 20 BRPM | SYSTOLIC BLOOD PRESSURE: 144 MMHG | WEIGHT: 251.3 LBS | BODY MASS INDEX: 46.25 KG/M2 | HEIGHT: 62 IN | DIASTOLIC BLOOD PRESSURE: 81 MMHG

## 2022-05-25 DIAGNOSIS — Z95.1 S/P CABG X 3: Primary | ICD-10-CM

## 2022-05-25 PROCEDURE — 99024 POSTOP FOLLOW-UP VISIT: CPT | Performed by: REGISTERED NURSE

## 2022-05-25 NOTE — PATIENT INSTRUCTIONS
Continue lifting restriction of 10 lbs until 6 weeks and 50 lbs until 12 weeks from date of surgery. No excessive jarring motions or twisting motions until 12 weeks from date of surgery.     Weigh daily.  Take Lasix (furosemide) for weight gain of 3 lbs in 24 hours or 5 lbs in 72 hours, take potassium pill with every Lasix dose.       Fat and Cholesterol Restricted Eating Plan  Getting too much fat and cholesterol in your diet may cause health problems. Choosing the right foods helps keep your fat and cholesterol at normal levels. This can keep you from getting certain diseases.  Your doctor may recommend an eating plan that includes:  Total fat: ______% or less of total calories a day.  Saturated fat: ______% or less of total calories a day.  Cholesterol: less than _________mg a day.  Fiber: ______g a day.  What are tips for following this plan?  Meal planning  At meals, divide your plate into four equal parts:  Fill one-half of your plate with vegetables and green salads.  Fill one-fourth of your plate with whole grains.  Fill one-fourth of your plate with low-fat (lean) protein foods.  Eat fish that is high in omega-3 fats at least two times a week. This includes mackerel, tuna, sardines, and salmon.  Eat foods that are high in fiber, such as whole grains, beans, apples, broccoli, carrots, peas, and barley.  General tips       Work with your doctor to lose weight if you need to.  Avoid:  Foods with added sugar.  Fried foods.  Foods with partially hydrogenated oils.  Limit alcohol intake to no more than 1 drink a day for nonpregnant women and 2 drinks a day for men. One drink equals 12 oz of beer, 5 oz of wine, or 1½ oz of hard liquor.  Reading food labels  Check food labels for:  Trans fats.  Partially hydrogenated oils.  Saturated fat (g) in each serving.  Cholesterol (mg) in each serving.  Fiber (g) in each serving.  Choose foods with healthy fats, such as:  Monounsaturated fats.  Polyunsaturated fats.  Omega-3  "fats.  Choose grain products that have whole grains. Look for the word \"whole\" as the first word in the ingredient list.  Cooking  Cook foods using low-fat methods. These include baking, boiling, grilling, and broiling.  Eat more home-cooked foods. Eat at restaurants and buffets less often.  Avoid cooking using saturated fats, such as butter, cream, palm oil, palm kernel oil, and coconut oil.  Recommended foods       Fruits  All fresh, canned (in natural juice), or frozen fruits.  Vegetables  Fresh or frozen vegetables (raw, steamed, roasted, or grilled). Green salads.  Grains  Whole grains, such as whole wheat or whole grain breads, crackers, cereals, and pasta. Unsweetened oatmeal, bulgur, barley, quinoa, or brown rice. Corn or whole wheat flour tortillas.  Meats and other protein foods  Ground beef (85% or leaner), grass-fed beef, or beef trimmed of fat. Skinless chicken or turkey. Ground chicken or turkey. Pork trimmed of fat. All fish and seafood. Egg whites. Dried beans, peas, or lentils. Unsalted nuts or seeds. Unsalted canned beans. Nut butters without added sugar or oil.  Dairy  Low-fat or nonfat dairy products, such as skim or 1% milk, 2% or reduced-fat cheeses, low-fat and fat-free ricotta or cottage cheese, or plain low-fat and nonfat yogurt.  Fats and oils  Tub margarine without trans fats. Light or reduced-fat mayonnaise and salad dressings. Avocado. Olive, canola, sesame, or safflower oils.  The items listed above may not be a complete list of foods and beverages you can eat. Contact a dietitian for more information.  Foods to avoid  Fruits  Canned fruit in heavy syrup. Fruit in cream or butter sauce. Fried fruit.  Vegetables  Vegetables cooked in cheese, cream, or butter sauce. Fried vegetables.  Grains  White bread. White pasta. White rice. Cornbread. Bagels, pastries, and croissants. Crackers and snack foods that contain trans fat and hydrogenated oils.  Meats and other protein foods  Fatty cuts of " meat. Ribs, chicken wings, morelos, sausage, bologna, salami, chitterlings, fatback, hot dogs, bratwurst, and packaged lunch meats. Liver and organ meats. Whole eggs and egg yolks. Chicken and turkey with skin. Fried meat.  Dairy  Whole or 2% milk, cream, half-and-half, and cream cheese. Whole milk cheeses. Whole-fat or sweetened yogurt. Full-fat cheeses. Nondairy creamers and whipped toppings. Processed cheese, cheese spreads, and cheese curds.  Beverages  Alcohol. Sugar-sweetened drinks such as sodas, lemonade, and fruit drinks.  Fats and oils  Butter, stick margarine, lard, shortening, ghee, or morelos fat. Coconut, palm kernel, and palm oils.  Sweets and desserts  Corn syrup, sugars, honey, and molasses. Candy. Jam and jelly. Syrup. Sweetened cereals. Cookies, pies, cakes, donuts, muffins, and ice cream.  The items listed above may not be a complete list of foods and beverages you should avoid. Contact a dietitian for more information.  Summary  Choosing the right foods helps keep your fat and cholesterol at normal levels. This can keep you from getting certain diseases.  At meals, fill one-half of your plate with vegetables and green salads.  Eat high-fiber foods, like whole grains, beans, apples, carrots, peas, and barley.  Limit added sugar, saturated fats, alcohol, and fried foods.  This information is not intended to replace advice given to you by your health care provider. Make sure you discuss any questions you have with your health care provider.  Document Revised: 08/21/2019 Document Reviewed: 09/04/2018  [employer] Patient Education © 2020 [employer]

## 2022-05-26 NOTE — PROGRESS NOTES
"..CARDIOVASCULAR SURGERY FOLLOW-UP PROGRESS NOTE    Chief Complaint: Post-Op Follow-Up      HPI:   Dear Jeffry Espino MD and colleagues:    It was nice to see Scarlett Ray in follow up approximately 4 weeks after surgery.  As you know, she is a 66 y.o. female with PMH significant for multi-vessel coronary artery disease, DM II, HTN, HLD, asthma, and sleep apena.  Underwent CABG x 3 utilizing LIMA/RSVG with Dr. Castellon on 4/29/2022.  See op report for full details.      She overall did well post-operatively.  An overnight oximetry study did show need for O2 to be bled into cpap.  On POD #5 she was deemed appropriate for discharge home.  She declined home health.    Ms. Ray was discharged home on aspirin, statin, and beta blocker (not all inclusive list of medications).    She reports following up with PCP & Cardiology.  She does not wish to participate in cardiac rehab.           /81 (BP Location: Right arm, Patient Position: Sitting, Cuff Size: Adult)   Pulse 86   Temp 97.8 °F (36.6 °C)   Resp 20   Ht 157.5 cm (62\")   Wt 114 kg (251 lb 4.8 oz)   SpO2 91%   BMI 45.96 kg/m²   Heart:  regular rate and rhythm, S1, S2 normal, no murmur, click, rub or gallop, no rub  Lungs:  clear to auscultation bilaterally, diminished breath sounds R base, L base, bilaterally  Extremities:  non-pitting lower extremity edema bilaterally  Incision(s):  mid chest healing well, no significant drainage, no dehiscence, no significant erythema, right leg healing well, no significant drainage, no dehiscence, no significant erythema    Assessment/Plan:   Ms. Ray presents to office today for follow-up.  Appears well and has no acute complaints/concerns.  Denies popping/clicking/rubbing in the sternum.  Denies fever/chills & sign/symptoms of infection. Mid-sternal incision and RSVG healing nicely with no erythema/drainage/dehiscence present.  Sternum stable on palpation.      From a surgical standpoint Ms. Ray is " progressing well. No need for scheduled follow-up visit in our office.     S/P CABG.     No significant post-op complications    No heavy lifting > 10 pounds for 2 more weeks  Keep incisions clean and dry  OK to drive if not taking narcotic pain medicine  OK to begin cardiac rehab  Follow-up as scheduled with cardiology  Follow-up as scheduled with PCP  Follow-up with CT surgery prn    Continue lifting restriction of 10 lbs until 6 weeks and 50 lbs until 12 weeks from the date of surgery, no excessive jarring motions or twisting motions until 12 weeks from the date of surgery.    Instructions/restrictions verbally reviewed with patient -- verbalized understanding.  Encouraged patient to call office with any questions or concerns.   After visit summary provided to patient.    Thank you for allowing me to participate in the plan of care for your patient.  Best Regards,    Verona Rangel, APRN  05/26/22  13:10 EDT

## 2022-06-30 ENCOUNTER — LAB (OUTPATIENT)
Dept: LAB | Facility: HOSPITAL | Age: 67
End: 2022-06-30

## 2022-06-30 ENCOUNTER — OFFICE VISIT (OUTPATIENT)
Dept: CARDIOLOGY | Facility: CLINIC | Age: 67
End: 2022-06-30

## 2022-06-30 VITALS — HEART RATE: 79 BPM | WEIGHT: 253 LBS | OXYGEN SATURATION: 97 % | BODY MASS INDEX: 46.56 KG/M2 | HEIGHT: 62 IN

## 2022-06-30 DIAGNOSIS — E78.00 HYPERCHOLESTEROLEMIA: ICD-10-CM

## 2022-06-30 DIAGNOSIS — E66.01 MORBIDLY OBESE: ICD-10-CM

## 2022-06-30 DIAGNOSIS — Z95.1 S/P CABG (CORONARY ARTERY BYPASS GRAFT): ICD-10-CM

## 2022-06-30 DIAGNOSIS — I25.110 CORONARY ARTERY DISEASE INVOLVING NATIVE CORONARY ARTERY OF NATIVE HEART WITH UNSTABLE ANGINA PECTORIS: Primary | ICD-10-CM

## 2022-06-30 LAB
ALBUMIN SERPL-MCNC: 3.7 G/DL (ref 3.5–5.2)
ALBUMIN/GLOB SERPL: 1 G/DL
ALP SERPL-CCNC: 219 U/L (ref 39–117)
ALT SERPL W P-5'-P-CCNC: 12 U/L (ref 1–33)
ANION GAP SERPL CALCULATED.3IONS-SCNC: 13 MMOL/L (ref 5–15)
AST SERPL-CCNC: 18 U/L (ref 1–32)
BILIRUB SERPL-MCNC: 0.4 MG/DL (ref 0–1.2)
BUN SERPL-MCNC: 15 MG/DL (ref 8–23)
BUN/CREAT SERPL: 18.1 (ref 7–25)
CALCIUM SPEC-SCNC: 9.8 MG/DL (ref 8.6–10.5)
CHLORIDE SERPL-SCNC: 102 MMOL/L (ref 98–107)
CHOLEST SERPL-MCNC: 111 MG/DL (ref 0–200)
CO2 SERPL-SCNC: 24 MMOL/L (ref 22–29)
CREAT SERPL-MCNC: 0.83 MG/DL (ref 0.57–1)
EGFRCR SERPLBLD CKD-EPI 2021: 77.4 ML/MIN/1.73
GLOBULIN UR ELPH-MCNC: 3.7 GM/DL
GLUCOSE SERPL-MCNC: 118 MG/DL (ref 65–99)
HDLC SERPL-MCNC: 36 MG/DL (ref 40–60)
LDLC SERPL CALC-MCNC: 39 MG/DL (ref 0–100)
LDLC/HDLC SERPL: 0.83 {RATIO}
POTASSIUM SERPL-SCNC: 4.1 MMOL/L (ref 3.5–5.2)
PROT SERPL-MCNC: 7.4 G/DL (ref 6–8.5)
SODIUM SERPL-SCNC: 139 MMOL/L (ref 136–145)
TRIGL SERPL-MCNC: 226 MG/DL (ref 0–150)
VLDLC SERPL-MCNC: 36 MG/DL (ref 5–40)

## 2022-06-30 PROCEDURE — 99214 OFFICE O/P EST MOD 30 MIN: CPT | Performed by: INTERNAL MEDICINE

## 2022-06-30 PROCEDURE — 80053 COMPREHEN METABOLIC PANEL: CPT

## 2022-06-30 PROCEDURE — 80061 LIPID PANEL: CPT

## 2022-06-30 PROCEDURE — 36415 COLL VENOUS BLD VENIPUNCTURE: CPT

## 2022-06-30 RX ORDER — POTASSIUM CHLORIDE 20 MEQ/1
20 TABLET, EXTENDED RELEASE ORAL AS NEEDED
Qty: 30 TABLET | Refills: 3 | Status: SHIPPED | OUTPATIENT
Start: 2022-06-30 | End: 2022-08-29

## 2022-06-30 RX ORDER — FUROSEMIDE 40 MG/1
40 TABLET ORAL AS NEEDED
Qty: 30 TABLET | Refills: 11 | Status: SHIPPED | OUTPATIENT
Start: 2022-06-30

## 2022-06-30 NOTE — PROGRESS NOTES
PATIENTINFORMATION    Date of Office Visit: 2022  Encounter Provider: Jon Guerrier MD  Place of Service: [facility] CARDIOLOGY  Patient Name: Scarlett Ray  : 1955    Subjective:     Encounter Date:2022      Patient ID: Scarlett Ray is a 66 y.o. female.    Chief Complaint   Patient presents with   • Coronary Artery Disease     HPI  Ms. Ray is a pleasant 66 years old lady who came to cardiac clinic for follow-up visit.  She has been a stent about doing cardiac rehab since after bypass surgery.  She denies any significant recurrence of chest pain and she tries to be reasonably active at home but has not started back to baseline activities.  She is compliant with all her current medications without any new significant side effects.  She wears her CPAP machine at night.      ROS  All systems reviewed and negative except as noted in HPI.    Past Medical History:   Diagnosis Date   • Asthma    • Elevated cholesterol    • H/O seasonal allergies    • HLD (hyperlipidemia)    • Hypertension    • Obesity    • NONI (obstructive sleep apnea)     compliant w/ CPAP   • Type 2 diabetes mellitus (HCC)     on metformin at home       Past Surgical History:   Procedure Laterality Date   • BREAST BIOPSY Left    • CARDIAC CATHETERIZATION N/A 2022    Procedure: Left Heart Cath;  Surgeon: Ada Martinez MD;  Location:  [facility];  Service: Cardiology;  Laterality: N/A;   • CARDIAC CATHETERIZATION N/A 2022    Procedure: Left ventriculography;  Surgeon: Ada Martinez MD;  Location:  [facility];  Service: Cardiology;  Laterality: N/A;   • CARDIAC CATHETERIZATION N/A 2022    Procedure: Coronary angiography;  Surgeon: Ada Martinez MD;  Location:  [facility];  Service: Cardiology;  Laterality: N/A;   •  SECTION     • CORONARY ARTERY BYPASS GRAFT N/A 2022    Procedure: MIDLINE STERNOTOMY, CORONARY ARTERY BYPASS  "GRAFTING X3 , UTILIZING THE RIGHT SAPHENOUS VEIN AND LEFT SAM, RAINE, PRP;  Surgeon: Rodolfo Castellon MD;  Location: [facility];  Service: Cardiothoracic;  Laterality: N/A;   • CORONARY ARTERY BYPASS GRAFT  4/29/2022    Procedure: ;  Surgeon: Rodolfo Castellon MD;  Location: [facility];  Service: Cardiothoracic;;   • DILATATION AND CURETTAGE      X2   • UMBILICAL HERNIA REPAIR         Social History     Socioeconomic History   • Marital status:    Tobacco Use   • Smoking status: Never Smoker   • Smokeless tobacco: Never Used   • Tobacco comment: no caffiene   Vaping Use   • Vaping Use: Never used   Substance and Sexual Activity   • Alcohol use: No   • Drug use: No   • Sexual activity: Defer       History reviewed. No pertinent family history.      Procedures       Objective:     Pulse 79   Ht 157.5 cm (62\")   Wt 115 kg (253 lb)   SpO2 97%   BMI 46.27 kg/m²  Body mass index is 46.27 kg/m².     Constitutional:       General: Not in acute distress.     Appearance: Morbidly obese. Not diaphoretic.   Eyes:      Pupils: Pupils are equal, round, and reactive to light.   HENT:      Head: Normocephalic and atraumatic.   Neck:      Thyroid: No thyromegaly.   Pulmonary:      Effort: Pulmonary effort is normal. No respiratory distress.      Breath sounds: Normal breath sounds. No wheezing. No rales.   Chest:      Chest wall: Not tender to palpatation.   Cardiovascular:      Normal rate. Regular rhythm.      No gallop.   Pulses:     Intact distal pulses.   Edema:     Peripheral edema absent.   Abdominal:      General: Bowel sounds are normal. There is no distension.      Palpations: Abdomen is soft.      Tenderness: There is no guarding.   Musculoskeletal: Normal range of motion.         General: No deformity.      Cervical back: Normal range of motion and neck supple. Skin:     General: Skin is warm and dry.      Findings: No rash.   Neurological:      Mental Status: Alert and oriented to person, place, " and time.      Cranial Nerves: No cranial nerve deficit.      Deep Tendon Reflexes: Reflexes are normal and symmetric.   Psychiatric:         Judgment: Judgment normal.         Review Of Data: I have reviewed pertinent recent labs, images and documents and pertinent findings included in HPI or assessment below.    Lipid Panel    Lipid Panel 4/29/22   Total Cholesterol 194   Triglycerides 253 (A)   HDL Cholesterol 33 (A)   VLDL Cholesterol 44 (A)   LDL Cholesterol  117 (A)   LDL/HDL Ratio 3.35   (A) Abnormal value                Assessment/Plan:         1.  Multivessel coronary artery status post CABG x3: LIMA to LAD, SVG to OM1, SVG to OM 2 on 4/29/2022 after presenting with unstable angina.    Preserved left ventricular ejection fraction by echocardiogram done in April 2022  2.  Type 2 diabetes mellitus-on metformin  3.  Hypercholesterolemia-continue lipid-lowering therapy.  She is currently on 40 mg daily.  Lipid panel in April 2022 shows LDL above goal  Repeat lipid panel  4.  Essential hypertension- BP in office today is 150/80.  She reports normal blood pressure reading at home  5.  Morbidly obese with obstructive sleep apnea -counseled about dietary modification to lose weight  I will put referral to nutrition services  6.  Bilateral leg and ankle edema-mild.  She has been taking Lasix and potassium every day  She will start to use as needed only.      Asymptomatic  Counseled about starting cardiac rehab.  I have put referral to cardiac rehab.  Check BMP as she has been on Lasix for several months.  Lipid panel.  She will start to take her blood pressure regularly at home    Return to clinic in 6 months or sooner with any concerning symptoms.  Diagnosis and plan of care discussed with patient and verbalized understanding.            Your medication list          Accurate as of June 30, 2022  2:48 PM. If you have any questions, ask your nurse or doctor.            CHANGE how you take these medications       Instructions Last Dose Given Next Dose Due   furosemide 40 MG tablet  Commonly known as: LASIX  What changed:   · when to take this  · reasons to take this  Changed by: Jon Guerrier MD      Take 1 tablet by mouth As Needed (for leg swelling).       potassium chloride 20 MEQ CR tablet  Commonly known as: K-DUR,KLOR-CON  What changed:   · when to take this  · reasons to take this  Changed by: Jon Guerrier MD      Take 1 tablet by mouth As Needed (with lasix) for up to 60 days.          CONTINUE taking these medications      Instructions Last Dose Given Next Dose Due   albuterol sulfate  (90 Base) MCG/ACT inhaler  Commonly known as: PROVENTIL HFA;VENTOLIN HFA;PROAIR HFA      Inhale 2 puffs Every 6 (Six) Hours As Needed for Shortness of Air or Wheezing.       aspirin 81 MG EC tablet      Take 1 tablet by mouth Daily.       atorvastatin 40 MG tablet  Commonly known as: LIPITOR      Take 1 tablet by mouth Every Night.       Biotin 1000 MCG tablet      Take 1,000 mcg by mouth Daily.       calcium carbonate 600 MG tablet  Commonly known as: OS-NUSRAT      Take 600 mg by mouth Daily.       cholecalciferol 25 MCG (1000 UT) tablet  Commonly known as: VITAMIN D3      Take 2,000 Units by mouth Daily.       diphenhydrAMINE 25 mg capsule  Commonly known as: BENADRYL      Take 1 capsule by mouth Every 6 (Six) Hours As Needed for Itching.       metFORMIN  MG 24 hr tablet  Commonly known as: GLUCOPHAGE-XR      Take 1 tablet by mouth 2 (Two) Times a Day for 90 days.       metoprolol tartrate 50 MG tablet  Commonly known as: LOPRESSOR      Take 1 tablet by mouth 2 (Two) Times a Day for 90 days.             Where to Get Your Medications      These medications were sent to [facility] - [place] - [address] - [phone]  - [phone] FX  [address], [place] [address]    Phone: [phone]   · furosemide 40 MG tablet  · potassium chloride 20 MEQ CR tablet              Jon Guerrier MD  06/30/22  14:48 EDT

## 2022-07-01 ENCOUNTER — TELEPHONE (OUTPATIENT)
Dept: CARDIOLOGY | Facility: CLINIC | Age: 67
End: 2022-07-01

## 2022-07-01 DIAGNOSIS — I25.110 CORONARY ARTERY DISEASE INVOLVING NATIVE CORONARY ARTERY OF NATIVE HEART WITH UNSTABLE ANGINA PECTORIS: Primary | ICD-10-CM

## 2022-07-01 NOTE — TELEPHONE ENCOUNTER
Sylvia with [facility] called and LVM stating when pt had her lipid panel drawn yesterday she had not been fasting. She is need a new order placed for this. // HG

## 2022-07-05 NOTE — PROGRESS NOTES
Please make patient aware of normal cholesterol levels except for elevated triglyceride.  She can try over-the-counter fish oil besides her current medications.  Let me know if she has any questions.  Thank you

## 2022-07-06 ENCOUNTER — LAB (OUTPATIENT)
Dept: LAB | Facility: HOSPITAL | Age: 67
End: 2022-07-06

## 2022-07-06 DIAGNOSIS — I25.110 CORONARY ARTERY DISEASE INVOLVING NATIVE CORONARY ARTERY OF NATIVE HEART WITH UNSTABLE ANGINA PECTORIS: ICD-10-CM

## 2022-07-06 LAB
CHOLEST SERPL-MCNC: 99 MG/DL (ref 0–200)
HDLC SERPL-MCNC: 37 MG/DL (ref 40–60)
LDLC SERPL CALC-MCNC: 39 MG/DL (ref 0–100)
LDLC/HDLC SERPL: 0.96 {RATIO}
TRIGL SERPL-MCNC: 133 MG/DL (ref 0–150)
VLDLC SERPL-MCNC: 23 MG/DL (ref 5–40)

## 2022-07-06 PROCEDURE — 80061 LIPID PANEL: CPT

## 2022-07-06 PROCEDURE — 36415 COLL VENOUS BLD VENIPUNCTURE: CPT

## 2022-07-27 ENCOUNTER — HOSPITAL ENCOUNTER (OUTPATIENT)
Dept: NUTRITION | Facility: HOSPITAL | Age: 67
Discharge: HOME | End: 2022-07-27
Admitting: INTERNAL MEDICINE

## 2022-07-27 PROCEDURE — 97802 MEDICAL NUTRITION INDIV IN: CPT

## 2022-07-27 NOTE — CONSULTS
Mrs. Ray was seen today by Registered Dietitian for Diabetes diet education. Consistent with the ADA’s standards of care, a comprehensive assessment/training record has been sent to medical records to scan and associate with this encounter.    Carbohydrate counting,labels, and meal planning discussed. Checks blood sugar every morning, this am ~125mg/dL. ADA goals reviewed. Practiced carb counting looking up [employer] and [employer].     Mrs. Ray has been encouraged to call our office with questions or additional education needs. Please place referral for additional services or follow-up should need arise.    Thank you for the referral.

## 2022-08-01 ENCOUNTER — TELEPHONE (OUTPATIENT)
Dept: CARDIAC REHAB | Facility: HOSPITAL | Age: 67
End: 2022-08-01

## 2023-01-12 ENCOUNTER — OFFICE VISIT (OUTPATIENT)
Dept: CARDIOLOGY | Facility: CLINIC | Age: 68
End: 2023-01-12
Payer: MEDICARE

## 2023-01-12 VITALS
BODY MASS INDEX: 47.11 KG/M2 | WEIGHT: 256 LBS | SYSTOLIC BLOOD PRESSURE: 120 MMHG | OXYGEN SATURATION: 95 % | HEIGHT: 62 IN | DIASTOLIC BLOOD PRESSURE: 80 MMHG | RESPIRATION RATE: 16 BRPM | HEART RATE: 69 BPM

## 2023-01-12 DIAGNOSIS — I10 ESSENTIAL HYPERTENSION: Primary | ICD-10-CM

## 2023-01-12 DIAGNOSIS — I25.810 CORONARY ARTERY DISEASE INVOLVING CORONARY BYPASS GRAFT OF NATIVE HEART WITHOUT ANGINA PECTORIS: ICD-10-CM

## 2023-01-12 PROCEDURE — 99214 OFFICE O/P EST MOD 30 MIN: CPT | Performed by: INTERNAL MEDICINE

## 2023-01-12 PROCEDURE — 93000 ELECTROCARDIOGRAM COMPLETE: CPT | Performed by: INTERNAL MEDICINE

## 2023-01-12 NOTE — PROGRESS NOTES
PATIENTINFORMATION    Date of Office Visit: 2023  Encounter Provider: Jon Guerrier MD  Place of Service: [facility] CARDIOLOGY  Patient Name: Scarlett Ray  : 1955    Subjective:     Encounter Date:2023      Patient ID: Scarlett Ray is a 67 y.o. female.    No chief complaint on file.    HPI  Ms. Ray is a pleasant 67 years old lady who came to cardiology clinic for follow-up visit.  She is compliant with all her current medications without any significant side effects.She  denies any chest pain, significant shortness of breath, orthopnea, PND, extremity swelling, palpitations, presyncope or syncope.  No ER visits or hospitalizations since last clinic visit.  She is reasonably active  She does not exercise regularly.  Denies exertional symptoms  Compliant with CPAP      ROS  All systems reviewed and negative except as noted in HPI.    Past Medical History:   Diagnosis Date   • Asthma    • Elevated cholesterol    • H/O seasonal allergies    • HLD (hyperlipidemia)    • Hypertension    • Obesity    • NONI (obstructive sleep apnea)     compliant w/ CPAP   • Type 2 diabetes mellitus (HCC)     on metformin at home       Past Surgical History:   Procedure Laterality Date   • BREAST BIOPSY Left    • CARDIAC CATHETERIZATION N/A 2022    Procedure: Left Heart Cath;  Surgeon: Ada Martinez MD;  Location:  [facility];  Service: Cardiology;  Laterality: N/A;   • CARDIAC CATHETERIZATION N/A 2022    Procedure: Left ventriculography;  Surgeon: Ada Martinez MD;  Location:  [facility];  Service: Cardiology;  Laterality: N/A;   • CARDIAC CATHETERIZATION N/A 2022    Procedure: Coronary angiography;  Surgeon: Ada Martinez MD;  Location:  [facility];  Service: Cardiology;  Laterality: N/A;   •  SECTION     • CORONARY ARTERY BYPASS GRAFT N/A 2022    Procedure: MIDLINE STERNOTOMY, CORONARY ARTERY BYPASS  "GRAFTING X3 , UTILIZING THE RIGHT SAPHENOUS VEIN AND LEFT SAM, RAINE, PRP;  Surgeon: Rodolfo Castellon MD;  Location: [facility];  Service: Cardiothoracic;  Laterality: N/A;   • CORONARY ARTERY BYPASS GRAFT  4/29/2022    Procedure: ;  Surgeon: Rodolfo Castellon MD;  Location: [facility];  Service: Cardiothoracic;;   • DILATATION AND CURETTAGE      X2   • UMBILICAL HERNIA REPAIR         Social History     Socioeconomic History   • Marital status:    Tobacco Use   • Smoking status: Never   • Smokeless tobacco: Never   • Tobacco comments:     no caffiene   Vaping Use   • Vaping Use: Never used   Substance and Sexual Activity   • Alcohol use: No   • Drug use: No   • Sexual activity: Defer       History reviewed. No pertinent family history.        ECG 12 Lead    Date/Time: 1/12/2023 12:13 PM  Performed by: Jon Guerrier MD  Authorized by: Jon Guerrier MD   Comparison: compared with previous ECG from 5/11/2022  Similar to previous ECG  Rhythm: sinus rhythm  Rate: normal  Conduction: conduction normal  ST Segments: ST segments normal  T Waves: T waves normal  QRS axis: normal  Other: no other findings  Other findings: low voltage    Clinical impression: normal ECG               Objective:     /80   Pulse 69   Resp 16   Ht 157.5 cm (62\")   Wt 116 kg (256 lb)   SpO2 95%   BMI 46.82 kg/m²  Body mass index is 46.82 kg/m².     Constitutional:       General: Not in acute distress.     Appearance: Morbidly obese. Not diaphoretic.   Eyes:      Pupils: Pupils are equal, round, and reactive to light.   HENT:      Head: Normocephalic and atraumatic.   Neck:      Thyroid: No thyromegaly.   Pulmonary:      Effort: Pulmonary effort is normal. No respiratory distress.      Breath sounds: Normal breath sounds. No wheezing. No rales.   Chest:      Chest wall: Not tender to palpatation.   Cardiovascular:      Normal rate. Regular rhythm.      No gallop.   Pulses:     Intact distal pulses. "   Abdominal:      General: Bowel sounds are normal. There is no distension.      Palpations: Abdomen is soft.      Tenderness: There is no guarding.   Musculoskeletal: Normal range of motion.         General: No deformity.      Cervical back: Normal range of motion and neck supple. Skin:     General: Skin is warm and dry.      Findings: No rash.   Neurological:      Mental Status: Alert and oriented to person, place, and time.      Cranial Nerves: No cranial nerve deficit.      Deep Tendon Reflexes: Reflexes are normal and symmetric.   Psychiatric:         Judgment: Judgment normal.         Review Of Data: I have reviewed pertinent recent labs, images and documents and pertinent findings included in HPI or assessment below.    Lipid Panel    Lipid Panel 4/29/22 6/30/22 7/6/22   Total Cholesterol 194 111 99   Triglycerides 253 (A) 226 (A) 133   HDL Cholesterol 33 (A) 36 (A) 37 (A)   VLDL Cholesterol 44 (A) 36 23   LDL Cholesterol  117 (A) 39 39   LDL/HDL Ratio 3.35 0.83 0.96   (A) Abnormal value                Assessment/Plan:         1.  Multivessel coronary artery status post CABG x3: LIMA to LAD, SVG to OM1, SVG to OM 2 on 4/29/2022 after presenting with unstable angina.    Preserved left ventricular ejection fraction by echocardiogram done in April 2022  2.  Type 2 diabetes mellitus-on metformin-A1c at goal  3.  Hypercholesterolemia-continue lipid-lowering therapy.  She is currently on 40 mg daily.  Most recent lipid panel is at goal  4.  Essential hypertension- excellent blood pressure control  5.  Morbidly obese with obstructive sleep apnea -compliant with sleep  6.  Bilateral leg and ankle edema-she takes as needed diuretic  No significant swelling today    Ms. Ray is doing well from cardiology standpoint.  She will start to walk regularly.  Continue current care    Return to clinic in 6 months or sooner with any concerning symptoms         Diagnosis and plan of care discussed with patient and verbalized  understanding.            Your medication list          Accurate as of January 12, 2023 12:12 PM. If you have any questions, ask your nurse or doctor.            CONTINUE taking these medications      Instructions Last Dose Given Next Dose Due   albuterol sulfate  (90 Base) MCG/ACT inhaler  Commonly known as: PROVENTIL HFA;VENTOLIN HFA;PROAIR HFA      Inhale 2 puffs Every 6 (Six) Hours As Needed for Shortness of Air or Wheezing.       aspirin 81 MG EC tablet      Take 1 tablet by mouth Daily.       atorvastatin 40 MG tablet  Commonly known as: LIPITOR      Take 1 tablet by mouth Every Night.       Biotin 1000 MCG tablet      Take 1,000 mcg by mouth Daily.       calcium carbonate 600 MG tablet  Commonly known as: OS-NUSRAT      Take 600 mg by mouth Daily.       cholecalciferol 25 MCG (1000 UT) tablet  Commonly known as: VITAMIN D3      Take 2,000 Units by mouth Daily.       diphenhydrAMINE 25 mg capsule  Commonly known as: BENADRYL      Take 1 capsule by mouth Every 6 (Six) Hours As Needed for Itching.       furosemide 40 MG tablet  Commonly known as: LASIX      Take 1 tablet by mouth As Needed (for leg swelling).       metFORMIN  MG 24 hr tablet  Commonly known as: GLUCOPHAGE-XR      Take 1 tablet by mouth 2 (Two) Times a Day for 90 days.       metoprolol tartrate 50 MG tablet  Commonly known as: LOPRESSOR      Take 1 tablet by mouth 2 (Two) Times a Day for 90 days.                  Jon Guerrier MD  01/12/23  12:12 EST

## 2023-03-27 RX ORDER — ATORVASTATIN CALCIUM 40 MG/1
TABLET, FILM COATED ORAL
Qty: 90 TABLET | Refills: 0 | Status: SHIPPED | OUTPATIENT
Start: 2023-03-27

## 2023-08-31 ENCOUNTER — OFFICE VISIT (OUTPATIENT)
Dept: CARDIOLOGY | Facility: CLINIC | Age: 68
End: 2023-08-31
Payer: MEDICARE

## 2023-08-31 VITALS
HEIGHT: 62 IN | BODY MASS INDEX: 46.74 KG/M2 | OXYGEN SATURATION: 97 % | HEART RATE: 76 BPM | WEIGHT: 254 LBS | SYSTOLIC BLOOD PRESSURE: 138 MMHG | DIASTOLIC BLOOD PRESSURE: 60 MMHG

## 2023-08-31 DIAGNOSIS — I10 ESSENTIAL HYPERTENSION: ICD-10-CM

## 2023-08-31 DIAGNOSIS — G47.33 OBSTRUCTIVE SLEEP APNEA, ADULT: ICD-10-CM

## 2023-08-31 DIAGNOSIS — I25.810 CORONARY ARTERY DISEASE INVOLVING CORONARY BYPASS GRAFT OF NATIVE HEART WITHOUT ANGINA PECTORIS: Primary | ICD-10-CM

## 2023-08-31 PROCEDURE — 99214 OFFICE O/P EST MOD 30 MIN: CPT | Performed by: INTERNAL MEDICINE

## 2023-08-31 PROCEDURE — 3075F SYST BP GE 130 - 139MM HG: CPT | Performed by: INTERNAL MEDICINE

## 2023-08-31 PROCEDURE — 3078F DIAST BP <80 MM HG: CPT | Performed by: INTERNAL MEDICINE

## 2023-08-31 RX ORDER — POTASSIUM CHLORIDE 20MEQ/15ML
20 LIQUID (ML) ORAL DAILY
COMMUNITY

## 2023-08-31 NOTE — PROGRESS NOTES
PATIENTINFORMATION    Date of Office Visit: 2023  Encounter Provider: Jon Guerrier MD  Place of Service: [facility] CARDIOLOGY  Patient Name: Scarlett Ray  : 1955    Subjective:     Encounter Date:2023      Patient ID: Scarlett Ray is a 67 y.o. female.    No chief complaint on file.    HPI  Ms. Ray is a pleasant 62 years old lady who came to cardiology clinic for follow-up visit.  She does house chores but does not exercise and denies any rest or exertional chest pain or recent change in breathing.  No orthopnea, PND, palpitation, presyncope or syncope.  Significantly improved lower extremity swelling.  She is compliant with current medication without any significant side effects.  Compliant with CPAP  No ER visit or hospitalization since last clinic visit.      ROS  All systems reviewed and negative except as noted in HPI.    Past Medical History:   Diagnosis Date    Asthma     Elevated cholesterol     H/O seasonal allergies     HLD (hyperlipidemia)     Hypertension     Obesity     NONI (obstructive sleep apnea)     compliant w/ CPAP    Type 2 diabetes mellitus     on metformin at home       Past Surgical History:   Procedure Laterality Date    BREAST BIOPSY Left     CARDIAC CATHETERIZATION N/A 2022    Procedure: Left Heart Cath;  Surgeon: Ada Martinez MD;  Location:  [facility];  Service: Cardiology;  Laterality: N/A;    CARDIAC CATHETERIZATION N/A 2022    Procedure: Left ventriculography;  Surgeon: Ada Martinez MD;  Location:  [facility];  Service: Cardiology;  Laterality: N/A;    CARDIAC CATHETERIZATION N/A 2022    Procedure: Coronary angiography;  Surgeon: Ada Martinez MD;  Location:  [facility];  Service: Cardiology;  Laterality: N/A;     SECTION      CORONARY ARTERY BYPASS GRAFT N/A 2022    Procedure: MIDLINE STERNOTOMY, CORONARY ARTERY BYPASS GRAFTING X3 , UTILIZING THE  "RIGHT SAPHENOUS VEIN AND LEFT SAM, RAINE, PRP;  Surgeon: Rodolfo Castellon MD;  Location: [facility];  Service: Cardiothoracic;  Laterality: N/A;    CORONARY ARTERY BYPASS GRAFT  4/29/2022    Procedure: ;  Surgeon: Rodolfo Castellon MD;  Location: [facility];  Service: Cardiothoracic;;    DILATATION AND CURETTAGE      X2    UMBILICAL HERNIA REPAIR         Social History     Socioeconomic History    Marital status:    Tobacco Use    Smoking status: Never    Smokeless tobacco: Never    Tobacco comments:     no caffiene   Vaping Use    Vaping Use: Never used   Substance and Sexual Activity    Alcohol use: No    Drug use: No    Sexual activity: Defer       History reviewed. No pertinent family history.      Procedures       Objective:     /60   Pulse 76   Ht 157.5 cm (62\")   Wt 115 kg (254 lb)   SpO2 97%   BMI 46.46 kg/mý  Body mass index is 46.46 kg/mý.     Constitutional:       General: Not in acute distress.     Appearance: Morbidly obese. Not diaphoretic.   Eyes:      Pupils: Pupils are equal, round, and reactive to light.   HENT:      Head: Normocephalic and atraumatic.   Neck:      Thyroid: No thyromegaly.   Pulmonary:      Effort: Pulmonary effort is normal. No respiratory distress.      Breath sounds: Normal breath sounds. No wheezing. No rales.   Chest:      Chest wall: Not tender to palpatation.   Cardiovascular:      Normal rate. Regular rhythm.      Murmurs: There is a harsh midsystolic murmur at the URSB, radiating to the neck.      No gallop.    Pulses:     Intact distal pulses.   Edema:     Peripheral edema absent.   Abdominal:      General: Bowel sounds are normal. There is no distension.      Palpations: Abdomen is soft.      Tenderness: There is no guarding.   Musculoskeletal: Normal range of motion.         General: No deformity.      Cervical back: Normal range of motion and neck supple. Skin:     General: Skin is warm and dry.      Findings: No rash.   Neurological:     "  Mental Status: Alert and oriented to person, place, and time.      Cranial Nerves: No cranial nerve deficit.      Deep Tendon Reflexes: Reflexes are normal and symmetric.   Psychiatric:         Judgment: Judgment normal.       Review Of Data: I have reviewed pertinent recent labs, images and documents and pertinent findings included in HPI or assessment below.          Assessment/Plan:         Multivessel coronary artery status post CABG x3: LIMA to LAD, SVG to OM1, SVG to OM 2 on 4/29/2022 after presenting with unstable angina. Preserved left ventricular ejection fraction by echocardiogram done in April 2022  Type 2 diabetes mellitus-on metformin-A1c at goal per most recent lab  Hypercholesterolemia-lipid panel at goal on high intensity statin  Essential hypertension- excellent blood pressure control  Morbidly obese with obstructive sleep apnea -compliant with sleep  Bilateral leg and ankle edema-she takes as needed diuretic-swelling almost resolved and she has not used diuretic since last clinic visit.  Aortic area systolic murmur likely from aortic valve [id number] with echocardiogram during follow-up visit.    Encouraged to start walking regularly  Return to clinic in 6 months or sooner with any concerning symptoms.    Diagnosis and plan of care discussed with patient and verbalized understanding.            Your medication list            Accurate as of August 31, 2023  1:09 PM. If you have any questions, ask your nurse or doctor.                CONTINUE taking these medications        Instructions Last Dose Given Next Dose Due   albuterol sulfate  (90 Base) MCG/ACT inhaler  Commonly known as: PROVENTIL HFA;VENTOLIN HFA;PROAIR HFA      Inhale 2 puffs Every 6 (Six) Hours As Needed for Shortness of Air or Wheezing.       aspirin 81 MG EC tablet      Take 1 tablet by mouth Daily.       atorvastatin 40 MG tablet  Commonly known as: LIPITOR      Take 1 tablet by mouth in the evening       Biotin  1000 MCG tablet      Take 1,000 mcg by mouth Daily.       calcium carbonate 600 MG tablet  Commonly known as: OS-NUSRAT      Take 1 tablet by mouth Daily.       cholecalciferol 25 MCG (1000 UT) tablet  Commonly known as: VITAMIN D3      Take 2 tablets by mouth Daily.       diphenhydrAMINE 25 mg capsule  Commonly known as: BENADRYL      Take 1 capsule by mouth Every 6 (Six) Hours As Needed for Itching.       furosemide 40 MG tablet  Commonly known as: LASIX      Take 1 tablet by mouth As Needed (for leg swelling).       metFORMIN  MG 24 hr tablet  Commonly known as: GLUCOPHAGE-XR      Take 1 tablet by mouth 2 (Two) Times a Day for 90 days.       metoprolol tartrate 50 MG tablet  Commonly known as: LOPRESSOR      Take 1 tablet by mouth 2 (Two) Times a Day for 90 days.       potassium chloride 20 mEq/15 mL solution  Commonly known as: KAYCIEL      Take 15 mL by mouth Daily.                    Jon Guerrier MD  08/31/23  13:09 EDT

## 2024-01-10 ENCOUNTER — APPOINTMENT (OUTPATIENT)
Dept: GENERAL RADIOLOGY | Facility: HOSPITAL | Age: 69
End: 2024-01-10
Payer: MEDICARE

## 2024-01-10 ENCOUNTER — HOSPITAL ENCOUNTER (EMERGENCY)
Facility: HOSPITAL | Age: 69
Discharge: HOME | End: 2024-01-10
Attending: EMERGENCY MEDICINE
Payer: MEDICARE

## 2024-01-10 VITALS
WEIGHT: 262 LBS | SYSTOLIC BLOOD PRESSURE: 169 MMHG | DIASTOLIC BLOOD PRESSURE: 83 MMHG | HEART RATE: 92 BPM | HEIGHT: 62 IN | TEMPERATURE: 97.6 F | BODY MASS INDEX: 48.21 KG/M2 | OXYGEN SATURATION: 93 % | RESPIRATION RATE: 16 BRPM

## 2024-01-10 DIAGNOSIS — S43.005A SHOULDER DISLOCATION, LEFT, INITIAL ENCOUNTER: Primary | ICD-10-CM

## 2024-01-10 PROCEDURE — 94799 UNLISTED PULMONARY SVC/PX: CPT

## 2024-01-10 PROCEDURE — 96374 THER/PROPH/DIAG INJ IV PUSH: CPT

## 2024-01-10 PROCEDURE — 73030 X-RAY EXAM OF SHOULDER: CPT

## 2024-01-10 PROCEDURE — 73020 X-RAY EXAM OF SHOULDER: CPT

## 2024-01-10 PROCEDURE — 99285 EMERGENCY DEPT VISIT HI MDM: CPT

## 2024-01-10 RX ORDER — HYDROCODONE BITARTRATE AND ACETAMINOPHEN 5; 325 MG/1; MG/1
1 TABLET ORAL ONCE
Status: COMPLETED | OUTPATIENT
Start: 2024-01-10 | End: 2024-01-10

## 2024-01-10 RX ORDER — SODIUM CHLORIDE 0.9 % (FLUSH) 0.9 %
10 SYRINGE (ML) INJECTION AS NEEDED
Status: DISCONTINUED | OUTPATIENT
Start: 2024-01-10 | End: 2024-01-10 | Stop reason: HOSPADM

## 2024-01-10 RX ORDER — HYDROCODONE BITARTRATE AND ACETAMINOPHEN 5; 325 MG/1; MG/1
1 TABLET ORAL EVERY 6 HOURS PRN
Qty: 12 TABLET | Refills: 0 | Status: SHIPPED | OUTPATIENT
Start: 2024-01-10 | End: 2024-01-13

## 2024-01-10 RX ADMIN — HYDROCODONE BITARTRATE AND ACETAMINOPHEN 1 TABLET: 5; 325 TABLET ORAL at 16:44

## 2024-01-10 RX ADMIN — METHOHEXITAL SODIUM 119 MG: 500 INJECTION, POWDER, LYOPHILIZED, FOR SOLUTION INTRAMUSCULAR; INTRAVENOUS; RECTAL at 17:55

## 2024-01-10 NOTE — ED PROVIDER NOTES
EMERGENCY DEPARTMENT ENCOUNTER      Room Number: 03/03    History is provided by the patient, no translation services needed    HPI:    Chief complaint:  left shoulder pain     Narrative: Pt is a 68 y.o. female who presents complaining of left shoulder pain.  Patient reports that she was walking today when she tripped and fell onto her left side.  Reports she is having left shoulder pain.  States it is not in her wrist or elbow at all.  No history of dislocations or shoulder surgeries.  She did not hit her head, no loss of consciousness and no neck pain.      PMD: Raza Chapman MD    REVIEW OF SYSTEMS  Review of Systems  Complete review of systems performed otherwise negative except pertinent positives per HPI.    PAST MEDICAL HISTORY  Active Ambulatory Problems     Diagnosis Date Noted    Obstructive sleep apnea, adult 11/14/2017    Essential hypertension 11/14/2017    Cor pulmonale 11/14/2017    Chest pain 04/28/2022    Chest pain, unspecified type 04/28/2022    Coronary artery disease involving native coronary artery of native heart with unstable angina pectoris 04/28/2022    Abnormal findings on diagnostic imaging of heart and coronary circulation 04/28/2022    Coronary artery disease involving coronary bypass graft of native heart without angina pectoris 01/12/2023     Resolved Ambulatory Problems     Diagnosis Date Noted    No Resolved Ambulatory Problems     Past Medical History:   Diagnosis Date    Asthma     Elevated cholesterol     H/O seasonal allergies     HLD (hyperlipidemia)     Hypertension     Obesity     NONI (obstructive sleep apnea)     Type 2 diabetes mellitus        PAST SURGICAL HISTORY  Past Surgical History:   Procedure Laterality Date    BREAST BIOPSY Left     CARDIAC CATHETERIZATION N/A 4/28/2022    Procedure: Left Heart Cath;  Surgeon: Ada Martinez MD;  Location: [facility] CATH INVASIVE LOCATION;  Service: Cardiology;  Laterality: N/A;    CARDIAC CATHETERIZATION N/A 4/28/2022     Procedure: Left ventriculography;  Surgeon: Ada Martinez MD;  Location: [facility] CATH INVASIVE LOCATION;  Service: Cardiology;  Laterality: N/A;    CARDIAC CATHETERIZATION N/A 2022    Procedure: Coronary angiography;  Surgeon: Ada Martinez MD;  Location: [facility] CATH INVASIVE LOCATION;  Service: Cardiology;  Laterality: N/A;     SECTION      CORONARY ARTERY BYPASS GRAFT N/A 2022    Procedure: MIDLINE STERNOTOMY, CORONARY ARTERY BYPASS GRAFTING X3 , UTILIZING THE RIGHT SAPHENOUS VEIN AND LEFT SAM, RAINE, PRP;  Surgeon: Rodolfo Castellon MD;  Location: [facility] CVOR;  Service: Cardiothoracic;  Laterality: N/A;    CORONARY ARTERY BYPASS GRAFT  2022    Procedure: ;  Surgeon: Rodolfo Castellon MD;  Location: [facility] CVOR;  Service: Cardiothoracic;;    DILATATION AND CURETTAGE      X2    UMBILICAL HERNIA REPAIR         FAMILY HISTORY  No family history on file.    SOCIAL HISTORY  Social History     Socioeconomic History    Marital status:    Tobacco Use    Smoking status: Never    Smokeless tobacco: Never    Tobacco comments:     no caffiene   Vaping Use    Vaping Use: Never used   Substance and Sexual Activity    Alcohol use: No    Drug use: No    Sexual activity: Defer       ALLERGIES  Ace inhibitors and Latex      Current Facility-Administered Medications:     [COMPLETED] Insert Peripheral IV, , , Once **AND** sodium chloride 0.9 % flush 10 mL, 10 mL, Intravenous, PRN, Thao Lira PA-C    Current Outpatient Medications:     albuterol sulfate  (90 Base) MCG/ACT inhaler, Inhale 2 puffs Every 6 (Six) Hours As Needed for Shortness of Air or Wheezing., Disp: , Rfl: 0    aspirin 81 MG EC tablet, Take 1 tablet by mouth Daily., Disp: 30 tablet, Rfl: 11    atorvastatin (LIPITOR) 40 MG tablet, Take 1 tablet by mouth in the evening, Disp: 90 tablet, Rfl: 0    Biotin 1000 MCG tablet, Take 1,000 mcg by mouth Daily., Disp: , Rfl:     calcium carbonate (OS-NUSRAT) 600 MG tablet,  Take 1 tablet by mouth Daily., Disp: , Rfl:     cholecalciferol (VITAMIN D3) 25 MCG (1000 UT) tablet, Take 2 tablets by mouth Daily., Disp: , Rfl:     diphenhydrAMINE (BENADRYL) 25 mg capsule, Take 1 capsule by mouth Every 6 (Six) Hours As Needed for Itching., Disp: , Rfl:     furosemide (LASIX) 40 MG tablet, Take 1 tablet by mouth As Needed (for leg swelling)., Disp: 30 tablet, Rfl: 11    HYDROcodone-acetaminophen (NORCO) 5-325 MG per tablet, Take 1 tablet by mouth Every 6 (Six) Hours As Needed for Moderate Pain for up to 3 days., Disp: 12 tablet, Rfl: 0    metFORMIN ER (GLUCOPHAGE-XR) 500 MG 24 hr tablet, Take 1 tablet by mouth 2 (Two) Times a Day for 90 days., Disp: 60 tablet, Rfl: 2    metoprolol tartrate (LOPRESSOR) 50 MG tablet, Take 1 tablet by mouth 2 (Two) Times a Day for 90 days., Disp: 60 tablet, Rfl: 2    potassium chloride (KAYCIEL) 20 mEq/15 mL solution, Take 15 mL by mouth Daily., Disp: , Rfl:     PHYSICAL EXAM  ED Triage Vitals [01/10/24 1619]   Temp Heart Rate Resp BP SpO2   97.6 °F (36.4 °C) 77 16 (!) 173/121 91 %      Temp src Heart Rate Source Patient Position BP Location FiO2 (%)   Oral Monitor Lying Right arm --       Physical Exam  Vitals and nursing note reviewed.   Constitutional:       Appearance: Normal appearance. She is normal weight. She is not ill-appearing or toxic-appearing.   HENT:      Head: Normocephalic and atraumatic.      Nose: Nose normal.      Mouth/Throat:      Mouth: Mucous membranes are moist.   Eyes:      Extraocular Movements: Extraocular movements intact.      Conjunctiva/sclera: Conjunctivae normal.      Pupils: Pupils are equal, round, and reactive to light.   Cardiovascular:      Rate and Rhythm: Normal rate and regular rhythm.      Pulses: Normal pulses.   Pulmonary:      Effort: Pulmonary effort is normal.   Abdominal:      General: Abdomen is flat.      Palpations: Abdomen is soft.      Tenderness: There is no abdominal tenderness.   Musculoskeletal:          General: Deformity (pain of left shoulder, bony step off, unable to touch opposite shoulder) present.      Cervical back: Normal range of motion. No tenderness.   Skin:     General: Skin is warm.      Capillary Refill: Capillary refill takes less than 2 seconds.      Coloration: Skin is not pale.      Findings: No lesion or rash.   Neurological:      General: No focal deficit present.      Mental Status: She is alert and oriented to person, place, and time.   Psychiatric:         Mood and Affect: Mood normal.           LAB RESULTS  Lab Results (last 24 hours)       ** No results found for the last 24 hours. **              I ordered the above labs and reviewed the results    RADIOLOGY  XR Shoulder 2+ View Left    Result Date: 1/10/2024  Exam: Left shoulder 1/10/2024 INDICATION: Fall with left shoulder pain today FINDINGS: 3 views left shoulder were obtained. There is an anterior inferior dislocation humeral head. There is a 10 mm bone density object lateral to the glenoid fossa. This appears well-corticated Anterior inferior dislocation of the humeral head 10 mm x 3 mm bone density object lateral to the glenoid fossa. This could represent a small bone fragment but it does appear well-corticated. Signer Name: Scar Pereira MD Signed: 1/10/2024 5:10 PM [facility]     I ordered the above radiologic testing and reviewed the results    PROCEDURES  Upper Extremity Dislocation    Date/Time: 1/10/2024 6:08 PM    Performed by: Thao Lira PA-C  Authorized by: Tc Suggs MD  Consent: Verbal consent obtained.  Risks and benefits: risks, benefits and alternatives were discussed  Consent given by: patient  Patient understanding: patient states understanding of the procedure being performed  Patient consent: the patient's understanding of the procedure matches consent given  Procedure consent: procedure consent matches procedure scheduled  Relevant documents: relevant documents present  "and verified  Test results: test results available and properly labeled  Imaging studies: imaging studies available  Patient identity confirmed: verbally with patient  Time out: Immediately prior to procedure a \"time out\" was called to verify the correct patient, procedure, equipment, support staff and site/side marked as required.  Injury location: shoulder  Location details: left shoulder  Injury type: dislocation  Dislocation type: anterior  Hill-Sachs deformity: no  Chronicity: new  Pre-procedure neurovascular assessment: neurovascularly intact  Pre-procedure distal perfusion: normal  Pre-procedure neurological function: normal  Pre-procedure range of motion: reduced    Anesthesia:  Local anesthesia used: no    Sedation:  Patient sedated: yes  Sedation type: moderate (conscious) sedation  Sedation: Brevitol.    Manipulation performed: yes  Reduction method: external rotation  Immobilization: sling  Post-procedure distal perfusion: normal  Post-procedure neurological function: normal  Post-procedure range of motion: normal  Patient tolerance: patient tolerated the procedure well with no immediate complications            PROGRESS AND CONSULTS           MEDICAL DECISION MAKING    MDM     Amount and/or Complexity of Data Reviewed  Tests in the radiology section of CPT®: reviewed    68-year-old female presenting after a fall for left shoulder pain.  On exam patient does have limited range of motion secondary to pain.  She was given a p.o. Norco.  X-ray performed and shows anterior dislocation. Patient consented for conscious sedation for shoulder reduction.  IV placed.  Patient was given Brevitol for sedation. Through direct manipulation shoulder reduced, post op xray performed and reveals successful reduction. Patient tolerated sedation without any complications.   Patient was given a short course of Norco and encouraged to follow-up with Ortho.  She is agreeable with this plan and discharged in stable condition.   "     DIAGNOSIS  Final diagnoses:   Shoulder dislocation, left, initial encounter       Latest Documented Vital Signs:  As of 18:46 EST  BP- 168/80 HR- 86 Temp- 97.6 °F (36.4 °C) (Oral) O2 sat- 95%    DISPOSITION    Discussed pertinent findings with the patient/family.  Patient/Family voiced understanding of need to follow-up for recheck and further testing as needed.  Return to the Emergency Department warnings were given.         Medication List        New Prescriptions      HYDROcodone-acetaminophen 5-325 MG per tablet  Commonly known as: NORCO  Take 1 tablet by mouth Every 6 (Six) Hours As Needed for Moderate Pain for up to 3 days.               Where to Get Your Medications        These medications were sent to [id number] - [place] - [address] - [phone]  - [phone]   [address], [place] [address]      Phone: [phone]   HYDROcodone-acetaminophen 5-325 MG per tablet              Follow-up Information       Juaquin Babin MD. Call in 1 day.    Specialties: Orthopedic Surgery, Sports Medicine  Why: To schedule follow up appointment  Contact information:  [address]  [facility] [address]  [place] [address] [phone]                               Dictated utilizing Dragon dictation     Thao Lira PA-C  01/10/24 2149

## 2024-01-10 NOTE — DISCHARGE INSTRUCTIONS
Follow-up with Ortho. Call in the morning to make an appointment. Wear sling for the next 5 days for comfort. No overhead motions.

## 2024-01-17 ENCOUNTER — OFFICE VISIT (OUTPATIENT)
Dept: ORTHOPEDIC SURGERY | Facility: CLINIC | Age: 69
End: 2024-01-17
Payer: MEDICARE

## 2024-01-17 VITALS
DIASTOLIC BLOOD PRESSURE: 76 MMHG | BODY MASS INDEX: 48.21 KG/M2 | SYSTOLIC BLOOD PRESSURE: 160 MMHG | HEART RATE: 76 BPM | WEIGHT: 262 LBS | HEIGHT: 62 IN

## 2024-01-17 DIAGNOSIS — S43.015A ANTERIOR DISLOCATION OF LEFT SHOULDER, INITIAL ENCOUNTER: Primary | ICD-10-CM

## 2024-01-17 RX ORDER — MELOXICAM 7.5 MG/1
7.5 TABLET ORAL DAILY
Qty: 30 TABLET | Refills: 5 | Status: SHIPPED | OUTPATIENT
Start: 2024-01-17

## 2024-01-17 RX ORDER — HYDROCODONE BITARTRATE AND ACETAMINOPHEN 5; 325 MG/1; MG/1
1 TABLET ORAL EVERY 6 HOURS PRN
Qty: 30 TABLET | Refills: 0 | Status: SHIPPED | OUTPATIENT
Start: 2024-01-17

## 2024-01-17 NOTE — PROGRESS NOTES
Subjective: Left shoulder dislocation     Patient ID: Scarlett Ray is a 68 y.o. female.    Chief Complaint:    History of Present Illness 68-year-old female right-hand-dominant is seen for an injury sustained to the left shoulder she fell at home on an outstretched left arm resulting in an injury to that area.  Developed immediate pain and discomfort was seen in the emergency room at [facility] where x-rays demonstrated an anterior dislocation of the show.  She underwent a successful closed reduction in the ER and postreduction x-ray showed relocated glenohumeral joint.  Pre and post reduction x-rays also showed a small well-corticated bony fragment lateral to the humeral head.  Patient denies any prior history of shoulder injury.  She is in a cradle sling still experiencing mild to moderate pain.       Social History     Occupational History    Not on file   Tobacco Use    Smoking status: Never    Smokeless tobacco: Never    Tobacco comments:     no caffiene   Vaping Use    Vaping Use: Never used   Substance and Sexual Activity    Alcohol use: No    Drug use: No    Sexual activity: Defer      Review of Systems      Past Medical History:   Diagnosis Date    Asthma     Elevated cholesterol     H/O seasonal allergies     HLD (hyperlipidemia)     Hypertension     Obesity     NONI (obstructive sleep apnea)     compliant w/ CPAP    Type 2 diabetes mellitus     on metformin at home     Past Surgical History:   Procedure Laterality Date    BREAST BIOPSY Left     CARDIAC CATHETERIZATION N/A 4/28/2022    Procedure: Left Heart Cath;  Surgeon: Ada Martinez MD;  Location: [facility] CATH INVASIVE LOCATION;  Service: Cardiology;  Laterality: N/A;    CARDIAC CATHETERIZATION N/A 4/28/2022    Procedure: Left ventriculography;  Surgeon: Ada Martinez MD;  Location: [facility] CATH INVASIVE LOCATION;  Service: Cardiology;  Laterality: N/A;    CARDIAC CATHETERIZATION N/A 4/28/2022    Procedure: Coronary angiography;  Surgeon: Michelel  MD Ada;  Location: [facility] CATH INVASIVE LOCATION;  Service: Cardiology;  Laterality: N/A;     SECTION      CORONARY ARTERY BYPASS GRAFT N/A 2022    Procedure: MIDLINE STERNOTOMY, CORONARY ARTERY BYPASS GRAFTING X3 , UTILIZING THE RIGHT SAPHENOUS VEIN AND LEFT SAM, RAINE, PRP;  Surgeon: Rodolfo Castellon MD;  Location: [facility];  Service: Cardiothoracic;  Laterality: N/A;    CORONARY ARTERY BYPASS GRAFT  2022    Procedure: ;  Surgeon: Rodolfo Castellon MD;  Location: [facility];  Service: Cardiothoracic;;    DILATATION AND CURETTAGE      X2    UMBILICAL HERNIA REPAIR       History reviewed. No pertinent family history.      Objective:  Vitals:    24 1143   BP: 160/76   Pulse: 76         24  1143   Weight: 119 kg (262 lb)     Body mass index is 47.92 kg/m².        Ortho Exam   reviewed with the patient and her  her x-rays from prior to the reduction and postreduction.  Also showed a cortical fragment lateral to the humeral head.  She is alert and oriented x 3.  Head is normocephalic and sclerae clear.  She has no motor or sensory deficit as far as radial, ulnar median nerve function.  He can fully extend the elbow flexion about 120 degrees.  She can extend only about 10 to 15 degrees and abduct 20 degrees.  Minimal pain on belly press test.  Skin is cool to touch.  There is no ecchymosis or bruising.    Assessment:    XR Shoulder 1 View Left    Result Date: 2024  Successful reduction of previously demonstrated left shoulder dislocation.  This report was finalized on 2024 8:05 AM by Dr. Manny Sahu MD.             1. Anterior dislocation of left shoulder, initial encounter           Plan: Reviewed with the patient and her  her pram postreduction x-rays history and physical exam.  Did discuss that this is 1 injury that in older patients is usually less pathology specifically rotator cuff tears or labral tears.  Did discuss that the cortical  fragment seen on x-ray is probably an old fragment but then needs to be evaluated possibly in the future.  For now we will begin meloxicam and continue the pain medication.  Did advise her on circumduction exercises in the shower twice a day.  Otherwise keep the sling on.  Return to see me in 2 weeks.  No x-rays necessary.  If her motion has not improved at all from today's visit we will do an MRI to evaluate the rotator cuff and the labrum.  Patient and  were in agreement.  Answered all questions            Work Status:    MICKY query complete.    Orders:  No orders of the defined types were placed in this encounter.      Medications:  New Medications Ordered This Visit   Medications    meloxicam (MOBIC) 7.5 MG tablet     Sig: Take 1 tablet by mouth Daily.     Dispense:  30 tablet     Refill:  5    HYDROcodone-acetaminophen (NORCO) 5-325 MG per tablet     Sig: Take 1 tablet by mouth Every 6 (Six) Hours As Needed for Severe Pain.     Dispense:  30 tablet     Refill:  0       Followup:  Return in about 2 weeks (around 1/31/2024).          Dictated utilizing Dragon dictation

## 2024-01-18 ENCOUNTER — PATIENT ROUNDING (BHMG ONLY) (OUTPATIENT)
Dept: ORTHOPEDIC SURGERY | Facility: CLINIC | Age: 69
End: 2024-01-18
Payer: MEDICARE

## 2024-01-18 NOTE — PROGRESS NOTES
A My-Chart message has been sent to the patient for PATIENT ROUNDING with [facility].

## 2024-01-31 ENCOUNTER — OFFICE VISIT (OUTPATIENT)
Dept: ORTHOPEDIC SURGERY | Facility: CLINIC | Age: 69
End: 2024-01-31
Payer: MEDICARE

## 2024-01-31 VITALS — WEIGHT: 262 LBS | HEIGHT: 62 IN | BODY MASS INDEX: 48.21 KG/M2

## 2024-01-31 DIAGNOSIS — S43.015D ANTERIOR DISLOCATION OF LEFT SHOULDER, SUBSEQUENT ENCOUNTER: Primary | ICD-10-CM

## 2024-01-31 NOTE — PROGRESS NOTES
Subjective: Anterior dislocation left shoulder     Patient ID: Scarlett Ray is a 68 y.o. female.    Chief Complaint:    History of Present Illness 68-year-old female is now approximately 3 weeks out from her injury and is doing much better.  She states she has no pain and is able to move her shoulder better.  She is unable to take the meloxicam due to GI side effects.       Social History     Occupational History    Not on file   Tobacco Use    Smoking status: Never    Smokeless tobacco: Never    Tobacco comments:     no caffiene   Vaping Use    Vaping Use: Never used   Substance and Sexual Activity    Alcohol use: No    Drug use: No    Sexual activity: Defer      Review of Systems      Past Medical History:   Diagnosis Date    Asthma     Elevated cholesterol     H/O seasonal allergies     HLD (hyperlipidemia)     Hypertension     Obesity     NONI (obstructive sleep apnea)     compliant w/ CPAP    Type 2 diabetes mellitus     on metformin at home     Past Surgical History:   Procedure Laterality Date    BREAST BIOPSY Left     CARDIAC CATHETERIZATION N/A 2022    Procedure: Left Heart Cath;  Surgeon: Ada Martinez MD;  Location: [facility] CATH INVASIVE LOCATION;  Service: Cardiology;  Laterality: N/A;    CARDIAC CATHETERIZATION N/A 2022    Procedure: Left ventriculography;  Surgeon: Ada Martinez MD;  Location: [facility] CATH INVASIVE LOCATION;  Service: Cardiology;  Laterality: N/A;    CARDIAC CATHETERIZATION N/A 2022    Procedure: Coronary angiography;  Surgeon: Ada Martinez MD;  Location: [facility] CATH INVASIVE LOCATION;  Service: Cardiology;  Laterality: N/A;     SECTION      CORONARY ARTERY BYPASS GRAFT N/A 2022    Procedure: MIDLINE STERNOTOMY, CORONARY ARTERY BYPASS GRAFTING X3 , UTILIZING THE RIGHT SAPHENOUS VEIN AND LEFT SAM, RAINE, PRP;  Surgeon: Rodolfo Castellon MD;  Location: [facility];  Service: Cardiothoracic;  Laterality: N/A;    CORONARY ARTERY BYPASS GRAFT  2022     Procedure: ;  Surgeon: Rodolfo Castellon MD;  Location: [facility];  Service: Cardiothoracic;;    DILATATION AND CURETTAGE      X2    UMBILICAL HERNIA REPAIR       History reviewed. No pertinent family history.      Objective:  There were no vitals filed for this visit.      01/31/24  0949   Weight: 119 kg (262 lb)     Body mass index is 47.92 kg/m².        Ortho Exam   .  She is alert and oriented x 3.  There is no motor or sensory deficit.  She is able to extend the shoulder about 80 degrees abduct about 70.  There is no pain with range of motion.  She can externally rotate probably 30 degrees.  She has good capillary refill.  She has full range of motion of the elbow.  Assessment:        1. Anterior dislocation of left shoulder, subsequent encounter           Plan: Patient is doing better at this point I do not believe there is any evidence of a rotator cuff tear she has made significant improvement although like to see how she does with physical therapy.  She will return to see me in 3 weeks.  Did advise her on circumduction exercises in the shower.  No x-rays necessary on return.            Work Status:    MICKY query complete.    Orders:  No orders of the defined types were placed in this encounter.      Medications:  No orders of the defined types were placed in this encounter.      Followup:  Return in about 3 weeks (around 2/21/2024).          Dictated utilizing Dragon dictation

## 2024-02-05 ENCOUNTER — TELEPHONE (OUTPATIENT)
Dept: OBSTETRICS AND GYNECOLOGY | Facility: CLINIC | Age: 69
End: 2024-02-05
Payer: MEDICARE

## 2024-02-05 NOTE — TELEPHONE ENCOUNTER
Dr. Chapman office called and stated this patient will be new to us, but he was wanting her to be seen ASAP due to PMB. Patient wants to see you only, and I am not able to fit her in with you in appropriate time frame. Can you please advise on scheduling?

## 2024-02-07 ENCOUNTER — TELEPHONE (OUTPATIENT)
Dept: ORTHOPEDIC SURGERY | Facility: CLINIC | Age: 69
End: 2024-02-07
Payer: MEDICARE

## 2024-02-07 NOTE — TELEPHONE ENCOUNTER
Caller: Scarlett Ray    Relationship to patient: Self    Best call back number: [phone] (home)     Patient is needing: PATIENT HAS A FOLLOW UP APPT ON 2/21/24 WITH [facility] AND WANTS TO KNOW IF IT SHOULD BE PUSHED BACK BECAUSE SHE WAS NOT ABLE TO GET INTO PT UNTIL 2/13/24. PLEASE ADVISE

## 2024-02-12 ENCOUNTER — PATIENT ROUNDING (BHMG ONLY) (OUTPATIENT)
Dept: OBSTETRICS AND GYNECOLOGY | Facility: CLINIC | Age: 69
End: 2024-02-12
Payer: MEDICARE

## 2024-02-12 ENCOUNTER — OFFICE VISIT (OUTPATIENT)
Dept: OBSTETRICS AND GYNECOLOGY | Facility: CLINIC | Age: 69
End: 2024-02-12
Payer: MEDICARE

## 2024-02-12 ENCOUNTER — PREP FOR SURGERY (OUTPATIENT)
Dept: OTHER | Facility: HOSPITAL | Age: 69
End: 2024-02-12
Payer: MEDICARE

## 2024-02-12 VITALS
WEIGHT: 258 LBS | BODY MASS INDEX: 47.48 KG/M2 | HEIGHT: 62 IN | DIASTOLIC BLOOD PRESSURE: 82 MMHG | SYSTOLIC BLOOD PRESSURE: 138 MMHG

## 2024-02-12 DIAGNOSIS — Z13.89 SCREENING FOR GENITOURINARY CONDITION: ICD-10-CM

## 2024-02-12 DIAGNOSIS — N95.0 PMB (POSTMENOPAUSAL BLEEDING): ICD-10-CM

## 2024-02-12 DIAGNOSIS — E66.01 MORBID OBESITY WITH BMI OF 45.0-49.9, ADULT: ICD-10-CM

## 2024-02-12 DIAGNOSIS — Z01.419 CERVICAL SMEAR, AS PART OF ROUTINE GYNECOLOGICAL EXAMINATION: Primary | ICD-10-CM

## 2024-02-12 DIAGNOSIS — N95.0 PMB (POSTMENOPAUSAL BLEEDING): Primary | ICD-10-CM

## 2024-02-12 DIAGNOSIS — Z12.31 ENCOUNTER FOR SCREENING MAMMOGRAM FOR MALIGNANT NEOPLASM OF BREAST: ICD-10-CM

## 2024-02-12 DIAGNOSIS — Z76.89 ENCOUNTER TO ESTABLISH CARE WITH NEW DOCTOR: ICD-10-CM

## 2024-02-12 RX ORDER — SODIUM CHLORIDE 0.9 % (FLUSH) 0.9 %
10 SYRINGE (ML) INJECTION EVERY 12 HOURS SCHEDULED
OUTPATIENT
Start: 2024-02-12

## 2024-02-12 RX ORDER — SODIUM CHLORIDE 9 MG/ML
40 INJECTION, SOLUTION INTRAVENOUS AS NEEDED
OUTPATIENT
Start: 2024-02-12

## 2024-02-12 RX ORDER — SODIUM CHLORIDE 0.9 % (FLUSH) 0.9 %
10 SYRINGE (ML) INJECTION AS NEEDED
OUTPATIENT
Start: 2024-02-12

## 2024-02-12 NOTE — PROGRESS NOTES
New GYN Exam    CC- Here for  VB    Scarlett Ray is a 68 y.o. female new patient who presents for  VB. She underwent menopause at age 45 and is not on any HRT. She has had spotting 3 times. She had a HS D&C in  for  VB and had a polyp. Her US today showed a 5.3 cm AV uterus with an EL= 0. 55 cm. There is fluid in the endometrium that measures 0.9 x 0.2 cm. Her ovaries were normal but visualization was limited. There is no comparable data. Her exam is extremely limited due to habitus. We discussed HS D&C with MYOSURE and she is agreeable.     OB History          1    Para   1    Term   1            AB        Living   1         SAB        IAB        Ectopic        Molar        Multiple        Live Births              Obstetric Comments   1 C/S               Menarche:10  Menopause:45  HRT:none  Current contraception: post menopausal status  History of abnormal Pap smear: no  History of abnormal mammogram: yes - s/p Breast bx, B9   Family history of uterine, colon or ovarian cancer: no  Family history of breast cancer: yes - p aunt > 50   STD's: none  Last pap smear: 2021- nl pap/HPV  Gardasil: missed  MIHIR: none      Health Maintenance   Topic Date Due    URINE MICROALBUMIN  Never done    COLORECTAL CANCER SCREENING  Never done    Pneumococcal Vaccine 65+ (1 of 2 - PCV) Never done    RSV Vaccine - Adults (1 - 1-dose 60+ series) Never done    ZOSTER VACCINE (2 of 3) 2017    HEPATITIS C SCREENING  Never done    ANNUAL WELLNESS VISIT  Never done    DIABETIC FOOT EXAM  Never done    DIABETIC EYE EXAM  Never done    HEMOGLOBIN A1C  10/29/2022    LIPID PANEL  2023    BMI FOLLOWUP  2023    INFLUENZA VACCINE  2023    MAMMOGRAM  2025    DXA SCAN  2025    PAP SMEAR  2027    TDAP/TD VACCINES (2 - Td or Tdap) 10/03/2027    COVID-19 Vaccine  Completed       Past Medical History:   Diagnosis Date    Asthma     Elevated cholesterol     H/O seasonal allergies      HLD (hyperlipidemia)     Hypertension     Obesity     NONI (obstructive sleep apnea)     compliant w/ CPAP    Type 2 diabetes mellitus     on metformin at home       Past Surgical History:   Procedure Laterality Date    BREAST BIOPSY Left     CARDIAC CATHETERIZATION N/A 2022    Procedure: Left Heart Cath;  Surgeon: Ada Martinez MD;  Location: [facility] CATH INVASIVE LOCATION;  Service: Cardiology;  Laterality: N/A;    CARDIAC CATHETERIZATION N/A 2022    Procedure: Left ventriculography;  Surgeon: Ada Martinez MD;  Location: [facility] CATH INVASIVE LOCATION;  Service: Cardiology;  Laterality: N/A;    CARDIAC CATHETERIZATION N/A 2022    Procedure: Coronary angiography;  Surgeon: Ada Martinez MD;  [facility] CATH [facility]VASIVE LOC[facility];  Service: Cardiology;  Laterality: N/A;     SECTION      CORONARY ARTERY BYPASS GRAFT N/A 2022    Procedure: MIDLINE STERNOTOMY, CORONARY ARTERY BYPASS GRAFTING X3 , UTILIZING THE RIGHT SAPHENOUS VEIN AND LEFT SAM, RAINE, PRP;  Surgeon: Rodolfo Castellon MD;  Location: [facility];  Service: Cardiothoracic;  Laterality: N/A;    CORONARY ARTERY BYPASS GRAFT  2022    Procedure: ;  Surgeon: Rodolfo Castellon MD;  Location: [facility];  Service: Cardiothoracic;;    DILATATION AND CURETTAGE      X2    UMBILICAL HERNIA REPAIR           Current Outpatient Medications:     albuterol sulfate  (90 Base) MCG/ACT inhaler, Inhale 2 puffs Every 6 (Six) Hours As Needed for Shortness of Air or Wheezing., Disp: , Rfl: 0    aspirin 81 MG EC tablet, Take 1 tablet by mouth Daily., Disp: 30 tablet, Rfl: 11    atorvastatin (LIPITOR) 40 MG tablet, Take 1 tablet by mouth in the evening, Disp: 90 tablet, Rfl: 0    Biotin 1000 MCG tablet, Take 1,000 mcg by mouth Daily., Disp: , Rfl:     calcium carbonate (OS-NUSRAT) 600 MG tablet, Take 1 tablet by mouth Daily., Disp: , Rfl:     cholecalciferol (VITAMIN D3) 25 MCG (1000 UT) tablet, Take 2 tablets by mouth Daily.,  Disp: , Rfl:     diphenhydrAMINE (BENADRYL) 25 mg capsule, Take 1 capsule by mouth Every 6 (Six) Hours As Needed for Itching., Disp: , Rfl:     HYDROcodone-acetaminophen (NORCO) 5-325 MG per tablet, Take 1 tablet by mouth Every 6 (Six) Hours As Needed for Severe Pain., Disp: 30 tablet, Rfl: 0    furosemide (LASIX) 40 MG tablet, Take 1 tablet by mouth As Needed (for leg swelling). (Patient not taking: Reported on 2/12/2024), Disp: 30 tablet, Rfl: 11    meloxicam (MOBIC) 7.5 MG tablet, Take 1 tablet by mouth Daily. (Patient not taking: Reported on 1/31/2024), Disp: 30 tablet, Rfl: 5    metFORMIN ER (GLUCOPHAGE-XR) 500 MG 24 hr tablet, Take 1 tablet by mouth 2 (Two) Times a Day for 90 days., Disp: 60 tablet, Rfl: 2    metoprolol tartrate (LOPRESSOR) 50 MG tablet, Take 1 tablet by mouth 2 (Two) Times a Day for 90 days., Disp: 60 tablet, Rfl: 2    potassium chloride (KAYCIEL) 20 mEq/15 mL solution, Take 15 mL by mouth Daily. (Patient not taking: Reported on 2/12/2024), Disp: , Rfl:     Allergies   Allergen Reactions    Ace Inhibitors Anaphylaxis    Latex Hives    Meloxicam GI Intolerance     Nausea       Social History     Tobacco Use    Smoking status: Never    Smokeless tobacco: Never    Tobacco comments:     no caffiene   Vaping Use    Vaping Use: Never used   Substance Use Topics    Alcohol use: No    Drug use: No       Family History   Problem Relation Age of Onset    Breast cancer Paternal Aunt     Ovarian cancer Neg Hx     Uterine cancer Neg Hx     Colon cancer Neg Hx     Deep vein thrombosis Neg Hx     Pulmonary embolism Neg Hx        Review of Systems   Constitutional:  Positive for activity change. Negative for appetite change, fatigue, fever and unexpected weight change.   Eyes:  Negative for photophobia and visual disturbance.   Respiratory:  Negative for cough and shortness of breath.    Cardiovascular:  Negative for chest pain and palpitations.   Gastrointestinal:  Negative for abdominal distention,  "abdominal pain, constipation, diarrhea and nausea.   Endocrine: Negative for cold intolerance and heat intolerance.   Genitourinary:  Positive for vaginal bleeding. Negative for dyspareunia, dysuria, menstrual problem, pelvic pain and vaginal discharge.   Musculoskeletal:  Positive for arthralgias (shoulder pain) and joint swelling. Negative for back pain.   Skin:  Negative for color change and rash.   Neurological:  Negative for headaches.   Hematological:  Negative for adenopathy. Does not bruise/bleed easily.   Psychiatric/Behavioral:  Negative for dysphoric mood. The patient is not nervous/anxious.        /82   Ht 157.5 cm (62\")   Wt 117 kg (258 lb)   Breastfeeding No   BMI 47.19 kg/m²     Physical Exam  Vitals and nursing note reviewed. Exam conducted with a chaperone present.   Constitutional:       Appearance: Normal appearance. She is well-developed. She is obese.   HENT:      Head: Normocephalic and atraumatic.   Eyes:      General: No scleral icterus.     Conjunctiva/sclera: Conjunctivae normal.   Neck:      Thyroid: No thyromegaly.   Cardiovascular:      Rate and Rhythm: Normal rate and regular rhythm.   Pulmonary:      Effort: Pulmonary effort is normal.      Breath sounds: Normal breath sounds.   Abdominal:      General: Bowel sounds are normal. There is no distension.      Palpations: Abdomen is soft. There is no mass.      Tenderness: There is no abdominal tenderness. There is no guarding or rebound.      Hernia: No hernia is present.   Genitourinary:     Exam position: Supine.      Labia:         Right: No rash, tenderness or lesion.         Left: No rash, tenderness or lesion.       Urethra: No prolapse, urethral pain, urethral swelling or urethral lesion.      Vagina: No signs of injury and foreign body. Prolapsed vaginal walls present. No vaginal discharge, erythema, tenderness or bleeding.      Cervix: No cervical motion tenderness, discharge or friability.      Uterus: With uterine " prolapse. Not deviated, not enlarged, not fixed and not tender.       Adnexa:         Right: No mass, tenderness or fullness.          Left: No mass, tenderness or fullness.        Rectum: Normal.      Comments: Exam limited by habitus and mobility.   Musculoskeletal:      Cervical back: Neck supple.   Skin:     General: Skin is warm and dry.   Neurological:      Mental Status: She is alert and oriented to person, place, and time.   Psychiatric:         Behavior: Behavior normal.         Thought Content: Thought content normal.         Judgment: Judgment normal.            Assessment/Plan  1)  VB- EL 0.5 cm, fluid in the uterine canal. Plan HS D&C with MYOSURE. Parts of this document have been copied or forwarded from her previous visits and have been reviewed, updated and edited as indicated.   2) GYN HM: pap  SBE demonstrated and encouraged.  3) STD screening: declines Condoms encouraged.  4) Bone health - Weight bearing exercise, dietary calcium recommendations and vitamin D reviewed.   5) Diet and Exercise discussed  6) Smoking Status: No  7) Social: no issus  8)MMG: UTD 8/2023 B1. Schedule MMG 8/2024  9) DEXA-UTD 8/2023 osteopenia,  LROF 9.1 & 1.4 %.repeat in 2-3 years  10) I spent > 20 minutes on the separately reported service of E& M.  This time includes time spent by me in the following activities: preparing for the visit, reviewing tests, obtaining and/or reviewing a separately obtained history, performing a medically appropriate examination and/or evaluation, counseling and educating the patient/family/caregiver, ordering medications, tests, or procedures, referring and communicating with other health care professionals, documenting information in the medical record, independently interpreting results and communicating that information with the patient/family/caregiver and care coordination. This time is not included in the time used to interpret the ultrasound also reported today.            Diagnoses  and all orders for this visit:    1. Cervical smear, as part of routine gynecological examination (Primary)  -     Pap IG (Image Guided)    2. Screening for genitourinary condition  -     Cancel: POC Urinalysis Dipstick    3. PMB (postmenopausal bleeding)    4. Morbid obesity with BMI of 45.0-49.9, adult    5. Encounter to establish care with new doctor    6. Encounter for screening mammogram for malignant neoplasm of breast  -     Mammo Screening Digital Tomosynthesis Bilateral With CAD; Future        Sylvia Lizbet Bhardwaj MD  02/12/2024        17:51 EST

## 2024-02-14 ENCOUNTER — HOSPITAL ENCOUNTER (OUTPATIENT)
Dept: PHYSICAL THERAPY | Facility: HOSPITAL | Age: 69
Setting detail: THERAPIES SERIES
Discharge: HOME | End: 2024-02-14
Payer: MEDICARE

## 2024-02-14 DIAGNOSIS — S43.005A CLOSED DISLOCATION OF LEFT SHOULDER, INITIAL ENCOUNTER: Primary | ICD-10-CM

## 2024-02-14 LAB
CYTOLOGIST CVX/VAG CYTO: NORMAL
CYTOLOGY CVX/VAG DOC CYTO: NORMAL
CYTOLOGY CVX/VAG DOC THIN PREP: NORMAL
DX ICD CODE: NORMAL
HIV 1 & 2 AB SER-IMP: NORMAL
OTHER STN SPEC: NORMAL
STAT OF ADQ CVX/VAG CYTO-IMP: NORMAL

## 2024-02-14 PROCEDURE — 97161 PT EVAL LOW COMPLEX 20 MIN: CPT

## 2024-02-20 ENCOUNTER — HOSPITAL ENCOUNTER (OUTPATIENT)
Dept: PHYSICAL THERAPY | Facility: HOSPITAL | Age: 69
Setting detail: THERAPIES SERIES
Discharge: HOME | End: 2024-02-20
Payer: MEDICARE

## 2024-02-20 DIAGNOSIS — S43.005A CLOSED DISLOCATION OF LEFT SHOULDER, INITIAL ENCOUNTER: Primary | ICD-10-CM

## 2024-02-20 PROCEDURE — 97110 THERAPEUTIC EXERCISES: CPT

## 2024-02-20 PROCEDURE — 97140 MANUAL THERAPY 1/> REGIONS: CPT

## 2024-02-20 NOTE — THERAPY TREATMENT NOTE
Outpatient Physical Therapy Ortho Treatment Note   Rivka Steven     Patient Name: Scarlett Ray  : 1955  MRN: [id number]  Today's Date: 2024      Visit Date: 2024    Visit Dx:    ICD-10-CM ICD-9-CM   1. Closed dislocation of left shoulder, initial encounter  S43.005A 831.00       Patient Active Problem List   Diagnosis    Obstructive sleep apnea, adult    Essential hypertension    Cor pulmonale    Chest pain    Chest pain, unspecified type    Coronary artery disease involving native coronary artery of native heart with unstable angina pectoris    Abnormal findings on diagnostic imaging of heart and coronary circulation    Coronary artery disease involving coronary bypass graft of native heart without angina pectoris    PMB (postmenopausal bleeding)    BMI 45.0-49.9, adult        Past Medical History:   Diagnosis Date    Asthma     Elevated cholesterol     H/O seasonal allergies     HLD (hyperlipidemia)     Hypertension     Obesity     NONI (obstructive sleep apnea)     compliant w/ CPAP    Type 2 diabetes mellitus     on metformin at home        Past Surgical History:   Procedure Laterality Date    BREAST BIOPSY Left     CARDIAC CATHETERIZATION N/A 2022    Procedure: Left Heart Cath;  Surgeon: Ada Martinez MD;  Location: [facility] CATH INVASIVE LOCATION;  Service: Cardiology;  Laterality: N/A;    CARDIAC CATHETERIZATION N/A 2022    Procedure: Left ventriculography;  Surgeon: Ada Martinez MD;  Location:  [facility] CATH INVASIVE LOCATION;  Service: Cardiology;  Laterality: N/A;    CARDIAC CATHETERIZATION N/A 2022    Procedure: Coronary angiography;  Surgeon: Ada Martinez MD;  Location:  [facility] CATH INVASIVE LOCATION;  Service: Cardiology;  Laterality: N/A;     SECTION      CORONARY ARTERY BYPASS GRAFT N/A 2022    Procedure: MIDLINE STERNOTOMY, CORONARY ARTERY BYPASS GRAFTING X3 , UTILIZING THE RIGHT SAPHENOUS VEIN AND LEFT SAM, RAINE, PRP;  Surgeon: Rodolfo Castellon,  "MD;  Location: [facility];  Service: Cardiothoracic;  Laterality: N/A;    CORONARY ARTERY BYPASS GRAFT  4/29/2022    Procedure: ;  Surgeon: Rodolfo Castellon MD;  Location: [facility];  Service: Cardiothoracic;;    DILATATION AND CURETTAGE      X2    UMBILICAL HERNIA REPAIR                          PT Assessment/Plan       Row Name 02/20/24 0900          PT Assessment    Assessment Comments Patient presents to PT without sling on LUE. She is reporting feeling better. Patient with improved LUE function today. Progressed patient with ROM and strengthening exercises today and she tolerated this well. Plan to continue to progress patient as tolerated.  -AS        PT Plan    PT Plan Comments Continue with current treatment plan.  -AS               User Key  (r) = Recorded By, (t) = Taken By, (c) = Cosigned By      Initials Name Provider Type    AS Hussein Walton, PT Physical Therapist                     Modalities       Row Name 02/20/24 0900             Moist Heat    MH Applied Yes  -AS      Location Left Shoulder - Supine with roll under knees  -AS      PT Moist Heat Minutes 10  -AS      MH Prior to Rx Yes  -AS         Functional Testing    Outcome Measure Options Quick DASH  -AS                User Key  (r) = Recorded By, (t) = Taken By, (c) = Cosigned By      Initials Name Provider Type    AS Hussein Walton, PT Physical Therapist                   OP Exercises       Row Name 02/20/24 1004 02/20/24 0900          Subjective    Subjective Comments -- Patient states her shoulder is \"feeling good, better.\"  -AS        Total Minutes    41266 - PT Therapeutic Exercise Minutes 20  -AS --     71951 - PT Manual Therapy Minutes 10  -AS --        Exercise 1    Exercise Name 1 -- 3-Way Cane  -AS     Reps 1 -- 15 each  -AS     Time 1 -- 3 sec hold each  -AS        Exercise 2    Exercise Name 2 -- Pulley's - Flex & ABD  -AS     Time 2 -- 3 min each  -AS        Exercise 3    Exercise Name 3 -- S/L ER  -AS     " Reps 3 -- --  -AS        Exercise 4    Exercise Name 4 -- Empty/Full Can  -AS        Exercise 5    Exercise Name 5 -- Rows  -AS     Reps 5 -- 25  -AS     Time 5 -- Green LF  -AS        Exercise 6    Exercise Name 6 -- Extensions  -AS     Reps 6 -- 25  -AS     Time 6 -- Green LF  -AS        Exercise 7    Exercise Name 7 -- IR  -AS     Reps 7 -- 25  -AS     Time 7 -- Red LF  -AS        Exercise 8    Exercise Name 8 -- ER  -AS     Reps 8 -- 25  -AS     Time 8 -- Yellow LF  -AS               User Key  (r) = Recorded By, (t) = Taken By, (c) = Cosigned By      Initials Name Provider Type    AS Hussein Walton, PT Physical Therapist                             Manual Rx (last 36 hours)       Manual Treatments       Row Name 02/20/24 1004 02/20/24 0900          Total Minutes    34815 - PT Manual Therapy Minutes 10  -AS --        Manual Rx 1    Manual Rx 1 Location -- Left Shoulder  -AS     Manual Rx 1 Type -- PROM - Flex, ABD, IR, ER  -AS     Manual Rx 1 Duration -- 10 min  -AS               User Key  (r) = Recorded By, (t) = Taken By, (c) = Cosigned By      Initials Name Provider Type    AS Hussein Walton, PT Physical Therapist                             Outcome Measure Options: Quick DASH         Time Calculation:   Start Time: 0919  Stop Time: 0959  Time Calculation (min): 40 min  Timed Charges  18178 - PT Therapeutic Exercise Minutes: 20  54924 - PT Manual Therapy Minutes: 10  Untimed Charges  PT Moist Heat Minutes: 10  Total Minutes  Timed Charges Total Minutes: 30  Untimed Charges Total Minutes: 10   Total Minutes: 40  Therapy Charges for Today       Code Description Service Date Service Provider Modifiers Qty    70105645798 HC PT THER PROC EA 15 MIN 2/20/2024 Hussein Walton, PT GP 1    27571531583 HC PT MANUAL THERAPY EA 15 MIN 2/20/2024 Hussein Walton, PT GP 1            PT G-Codes  Outcome Measure Options: Wanda Walton, PT  2/20/2024

## 2024-02-23 ENCOUNTER — HOSPITAL ENCOUNTER (OUTPATIENT)
Dept: PHYSICAL THERAPY | Facility: HOSPITAL | Age: 69
Setting detail: THERAPIES SERIES
Discharge: HOME | End: 2024-02-23
Payer: MEDICARE

## 2024-02-23 DIAGNOSIS — S43.005A CLOSED DISLOCATION OF LEFT SHOULDER, INITIAL ENCOUNTER: Primary | ICD-10-CM

## 2024-02-23 PROCEDURE — 97140 MANUAL THERAPY 1/> REGIONS: CPT

## 2024-02-23 PROCEDURE — 97110 THERAPEUTIC EXERCISES: CPT

## 2024-02-23 NOTE — THERAPY TREATMENT NOTE
Outpatient Physical Therapy Ortho Treatment Note   Rivka Steven     Patient Name: Scarlett Ray  : 1955  MRN: [id number]  Today's Date: 2024      Visit Date: 2024    Visit Dx:    ICD-10-CM ICD-9-CM   1. Closed dislocation of left shoulder, initial encounter  S43.005A 831.00       Patient Active Problem List   Diagnosis    Obstructive sleep apnea, adult    Essential hypertension    Cor pulmonale    Chest pain    Chest pain, unspecified type    Coronary artery disease involving native coronary artery of native heart with unstable angina pectoris    Abnormal findings on diagnostic imaging of heart and coronary circulation    Coronary artery disease involving coronary bypass graft of native heart without angina pectoris    PMB (postmenopausal bleeding)    BMI 45.0-49.9, adult        Past Medical History:   Diagnosis Date    Asthma     Elevated cholesterol     H/O seasonal allergies     HLD (hyperlipidemia)     Hypertension     Obesity     NONI (obstructive sleep apnea)     compliant w/ CPAP    Type 2 diabetes mellitus     on metformin at home        Past Surgical History:   Procedure Laterality Date    BREAST BIOPSY Left     CARDIAC CATHETERIZATION N/A 2022    Procedure: Left Heart Cath;  Surgeon: Ada Martinez MD;  Location: [facility] CATH INVASIVE LOCATION;  Service: Cardiology;  Laterality: N/A;    CARDIAC CATHETERIZATION N/A 2022    Procedure: Left ventriculography;  Surgeon: Ada Martinez MD;  Location:  [facility] CATH INVASIVE LOCATION;  Service: Cardiology;  Laterality: N/A;    CARDIAC CATHETERIZATION N/A 2022    Procedure: Coronary angiography;  Surgeon: Ada Martinez MD;  Location:  [facility] CATH INVASIVE LOCATION;  Service: Cardiology;  Laterality: N/A;     SECTION      CORONARY ARTERY BYPASS GRAFT N/A 2022    Procedure: MIDLINE STERNOTOMY, CORONARY ARTERY BYPASS GRAFTING X3 , UTILIZING THE RIGHT SAPHENOUS VEIN AND LEFT SAM, RAINE, PRP;  Surgeon: Rodolfo Castellon,  MD;  Location: [facility];  Service: Cardiothoracic;  Laterality: N/A;    CORONARY ARTERY BYPASS GRAFT  4/29/2022    Procedure: ;  Surgeon: Rodolfo Castellon MD;  Location: [facility];  Service: Cardiothoracic;;    DILATATION AND CURETTAGE      X2    UMBILICAL HERNIA REPAIR                          PT Assessment/Plan       Row Name 02/23/24 0800          PT Assessment    Assessment Comments Continued with ROM and strengthening exercises today and she tolerated this well.  -AS        PT Plan    PT Plan Comments Continue with current treatment plan.  -AS               User Key  (r) = Recorded By, (t) = Taken By, (c) = Cosigned By      Initials Name Provider Type    AS Hussein Walton, PT Physical Therapist                     Modalities       Row Name 02/23/24 0800             Moist Heat    MH Applied Yes  -AS      Location Left Shoulder - Sitting  -AS      PT Moist Heat Minutes 10  -AS      MH Prior to Rx Yes  -AS         Functional Testing    Outcome Measure Options Quick DASH  -AS                User Key  (r) = Recorded By, (t) = Taken By, (c) = Cosigned By      Initials Name Provider Type    AS Hussein Walton, PT Physical Therapist                   OP Exercises       Row Name 02/23/24 0924 02/23/24 0800          Subjective    Subjective Comments -- Patient states her shoulder is doing better. She states she is able to use it more with ADL's  -AS        Total Minutes    83357 - PT Therapeutic Exercise Minutes 15  -AS --     66225 - PT Manual Therapy Minutes 10  -AS --        Exercise 1    Exercise Name 1 -- 3-Way Cane  -AS     Reps 1 -- 15 each  -AS     Time 1 -- 3 sec hold each  -AS        Exercise 2    Exercise Name 2 -- Pulley's - Flex & ABD  -AS     Time 2 -- 3 min each  -AS        Exercise 3    Exercise Name 3 -- S/L ER  -AS        Exercise 4    Exercise Name 4 -- Empty/Full Can  -AS        Exercise 5    Exercise Name 5 -- Rows  -AS     Reps 5 -- 25  -AS     Time 5 -- Green LF  -AS         Exercise 6    Exercise Name 6 -- Extensions  -AS     Reps 6 -- 25  -AS     Time 6 -- Green LF  -AS        Exercise 7    Exercise Name 7 -- IR  -AS     Reps 7 -- 25  -AS     Time 7 -- Red LF  -AS        Exercise 8    Exercise Name 8 -- ER  -AS     Reps 8 -- 25  -AS     Time 8 -- Yellow LF  -AS               User Key  (r) = Recorded By, (t) = Taken By, (c) = Cosigned By      Initials Name Provider Type    AS Hussein Walton, PT Physical Therapist                             Manual Rx (last 36 hours)       Manual Treatments       Row Name 02/23/24 0924 02/23/24 0700          Total Minutes    93254 - PT Manual Therapy Minutes 10  -AS --        Manual Rx 1    Manual Rx 1 Location -- Left Shoulder  -AS     Manual Rx 1 Type -- PROM - Flex, ABD, IR, ER  -AS     Manual Rx 1 Duration -- 10 min  -AS               User Key  (r) = Recorded By, (t) = Taken By, (c) = Cosigned By      Initials Name Provider Type    AS Hussein Walton, PT Physical Therapist                             Outcome Measure Options: Quick DASH         Time Calculation:   Start Time: 0848  Stop Time: 0924  Time Calculation (min): 36 min  Timed Charges  14475 - PT Therapeutic Exercise Minutes: 15  44151 - PT Manual Therapy Minutes: 10  Untimed Charges  PT Moist Heat Minutes: 10  Total Minutes  Timed Charges Total Minutes: 25  Untimed Charges Total Minutes: 10   Total Minutes: 35  Therapy Charges for Today       Code Description Service Date Service Provider Modifiers Qty    64069665739 HC PT THER PROC EA 15 MIN 2/23/2024 Hussein Walton, PT GP 1    98819845206 HC PT MANUAL THERAPY EA 15 MIN 2/23/2024 Hussein Walton, PT GP 1            PT G-Codes  Outcome Measure Options: Wanda Walton, PT  2/23/2024

## 2024-02-27 ENCOUNTER — HOSPITAL ENCOUNTER (OUTPATIENT)
Dept: PHYSICAL THERAPY | Facility: HOSPITAL | Age: 69
Setting detail: THERAPIES SERIES
Discharge: HOME | End: 2024-02-27
Payer: MEDICARE

## 2024-02-27 DIAGNOSIS — S43.005A CLOSED DISLOCATION OF LEFT SHOULDER, INITIAL ENCOUNTER: Primary | ICD-10-CM

## 2024-02-27 PROCEDURE — 97110 THERAPEUTIC EXERCISES: CPT

## 2024-02-27 NOTE — THERAPY TREATMENT NOTE
Outpatient Physical Therapy Ortho Treatment Note   Rivka Steven     Patient Name: Scarlett Ray  : 1955  MRN: [id number]  Today's Date: 2024      Visit Date: 2024    Visit Dx:    ICD-10-CM ICD-9-CM   1. Closed dislocation of left shoulder, initial encounter  S43.005A 831.00       Patient Active Problem List   Diagnosis    Obstructive sleep apnea, adult    Essential hypertension    Cor pulmonale    Chest pain    Chest pain, unspecified type    Coronary artery disease involving native coronary artery of native heart with unstable angina pectoris    Abnormal findings on diagnostic imaging of heart and coronary circulation    Coronary artery disease involving coronary bypass graft of native heart without angina pectoris    PMB (postmenopausal bleeding)    BMI 45.0-49.9, adult        Past Medical History:   Diagnosis Date    Asthma     Elevated cholesterol     H/O seasonal allergies     HLD (hyperlipidemia)     Hypertension     Obesity     NONI (obstructive sleep apnea)     compliant w/ CPAP    Type 2 diabetes mellitus     on metformin at home        Past Surgical History:   Procedure Laterality Date    BREAST BIOPSY Left     CARDIAC CATHETERIZATION N/A 2022    Procedure: Left Heart Cath;  Surgeon: Ada Martinez MD;  Location: [facility] CATH INVASIVE LOCATION;  Service: Cardiology;  Laterality: N/A;    CARDIAC CATHETERIZATION N/A 2022    Procedure: Left ventriculography;  Surgeon: Ada Martinez MD;  Location:  [facility] CATH INVASIVE LOCATION;  Service: Cardiology;  Laterality: N/A;    CARDIAC CATHETERIZATION N/A 2022    Procedure: Coronary angiography;  Surgeon: Ada Martinez MD;  Location:  [facility] CATH INVASIVE LOCATION;  Service: Cardiology;  Laterality: N/A;     SECTION      CORONARY ARTERY BYPASS GRAFT N/A 2022    Procedure: MIDLINE STERNOTOMY, CORONARY ARTERY BYPASS GRAFTING X3 , UTILIZING THE RIGHT SAPHENOUS VEIN AND LEFT SAM, RAINE, PRP;  Surgeon: Rodolfo Castellon,  MD;  Location: [facility];  Service: Cardiothoracic;  Laterality: N/A;    CORONARY ARTERY BYPASS GRAFT  4/29/2022    Procedure: ;  Surgeon: Rodolfo Castellon MD;  Location: [facility];  Service: Cardiothoracic;;    DILATATION AND CURETTAGE      X2    UMBILICAL HERNIA REPAIR                          PT Assessment/Plan       Row Name 02/27/24 1000          PT Assessment    Assessment Comments Patient continues to do well with PT at this time. Continued with ROM and strengthening exercises today and she tolerated this well.  -AS        PT Plan    PT Plan Comments Continue with current treatment plan.  -AS               User Key  (r) = Recorded By, (t) = Taken By, (c) = Cosigned By      Initials Name Provider Type    AS Hussein Walton, PT Physical Therapist                     Modalities       Row Name 02/27/24 1000             Moist Heat    MH Applied Yes  -AS      Location Left Shoulder - Sitting  -AS      PT Moist Heat Minutes 10  -AS      MH Prior to Rx Yes  -AS         Functional Testing    Outcome Measure Options Quick DASH  -AS                User Key  (r) = Recorded By, (t) = Taken By, (c) = Cosigned By      Initials Name Provider Type    AS Hussein Walton, PT Physical Therapist                   OP Exercises       Row Name 02/27/24 1059 02/27/24 1000          Subjective    Subjective Comments -- Patient states her shoulder has been hurting her a little more.  -AS        Total Minutes    12557 - PT Therapeutic Exercise Minutes 15  -AS --     36115 - PT Manual Therapy Minutes 10  -AS --        Exercise 1    Exercise Name 1 -- 3-Way Cane  -AS     Reps 1 -- 15 each  -AS     Time 1 -- 3 sec hold each  -AS        Exercise 2    Exercise Name 2 -- Pulley's - Flex & ABD  -AS     Time 2 -- 3 min each  -AS        Exercise 3    Exercise Name 3 -- S/L ER  -AS        Exercise 4    Exercise Name 4 -- Empty/Full Can  -AS        Exercise 5    Exercise Name 5 -- Rows  -AS     Reps 5 -- 25  -AS     Time 5 --  Green LF  -AS        Exercise 6    Exercise Name 6 -- Extensions  -AS     Reps 6 -- 25  -AS     Time 6 -- Green LF  -AS        Exercise 7    Exercise Name 7 -- IR  -AS     Reps 7 -- 25  -AS     Time 7 -- Red LF  -AS        Exercise 8    Exercise Name 8 -- ER  -AS     Reps 8 -- 25  -AS     Time 8 -- Yellow LF  -AS               User Key  (r) = Recorded By, (t) = Taken By, (c) = Cosigned By      Initials Name Provider Type    AS Hussein Walton, PT Physical Therapist                             Manual Rx (last 36 hours)       Manual Treatments       Row Name 02/27/24 1059 02/27/24 0900          Total Minutes    74925 - PT Manual Therapy Minutes 10  -AS --        Manual Rx 1    Manual Rx 1 Location -- Left Shoulder  -AS     Manual Rx 1 Type -- PROM - Flex, ABD, IR, ER  -AS     Manual Rx 1 Duration -- 10 min  -AS               User Key  (r) = Recorded By, (t) = Taken By, (c) = Cosigned By      Initials Name Provider Type    AS Hussein Walton, PT Physical Therapist                             Outcome Measure Options: Quick DASH         Time Calculation:   Start Time: 1020  Stop Time: 1059  Time Calculation (min): 39 min  Timed Charges  17430 - PT Therapeutic Exercise Minutes: 15  13778 - PT Manual Therapy Minutes: 10  Untimed Charges  PT Moist Heat Minutes: 10  Total Minutes  Timed Charges Total Minutes: 25  Untimed Charges Total Minutes: 10   Total Minutes: 35  Therapy Charges for Today       Code Description Service Date Service Provider Modifiers Qty    25723720575 HC PT THER PROC EA 15 MIN 2/27/2024 Hussein Walton, PT GP 2            PT G-Codes  Outcome Measure Options: Wanda Walton, PT  2/27/2024

## 2024-03-01 ENCOUNTER — HOSPITAL ENCOUNTER (OUTPATIENT)
Dept: PHYSICAL THERAPY | Facility: HOSPITAL | Age: 69
Setting detail: THERAPIES SERIES
Discharge: HOME | End: 2024-03-01
Payer: MEDICARE

## 2024-03-01 DIAGNOSIS — S43.005A CLOSED DISLOCATION OF LEFT SHOULDER, INITIAL ENCOUNTER: Primary | ICD-10-CM

## 2024-03-01 PROCEDURE — 97110 THERAPEUTIC EXERCISES: CPT

## 2024-03-01 NOTE — THERAPY TREATMENT NOTE
Outpatient Physical Therapy Ortho Treatment Note   Rivka Steven     Patient Name: Scarlett Ray  : 1955  MRN: [id number]  Today's Date: 3/1/2024      Visit Date: 2024    Visit Dx:    ICD-10-CM ICD-9-CM   1. Closed dislocation of left shoulder, initial encounter  S43.005A 831.00       Patient Active Problem List   Diagnosis    Obstructive sleep apnea, adult    Essential hypertension    Cor pulmonale    Chest pain    Chest pain, unspecified type    Coronary artery disease involving native coronary artery of native heart with unstable angina pectoris    Abnormal findings on diagnostic imaging of heart and coronary circulation    Coronary artery disease involving coronary bypass graft of native heart without angina pectoris    PMB (postmenopausal bleeding)    BMI 45.0-49.9, adult        Past Medical History:   Diagnosis Date    Asthma     Elevated cholesterol     H/O seasonal allergies     HLD (hyperlipidemia)     Hypertension     Obesity     NONI (obstructive sleep apnea)     compliant w/ CPAP    Type 2 diabetes mellitus     on metformin at home        Past Surgical History:   Procedure Laterality Date    BREAST BIOPSY Left     CARDIAC CATHETERIZATION N/A 2022    Procedure: Left Heart Cath;  Surgeon: Ada Martinez MD;  Location: [facility] CATH INVASIVE LOCATION;  Service: Cardiology;  Laterality: N/A;    CARDIAC CATHETERIZATION N/A 2022    Procedure: Left ventriculography;  Surgeon: Ada Martinez MD;  Location:  [facility] CATH INVASIVE LOCATION;  Service: Cardiology;  Laterality: N/A;    CARDIAC CATHETERIZATION N/A 2022    Procedure: Coronary angiography;  Surgeon: Ada Martinez MD;  Location:  [facility] CATH INVASIVE LOCATION;  Service: Cardiology;  Laterality: N/A;     SECTION      CORONARY ARTERY BYPASS GRAFT N/A 2022    Procedure: MIDLINE STERNOTOMY, CORONARY ARTERY BYPASS GRAFTING X3 , UTILIZING THE RIGHT SAPHENOUS VEIN AND LEFT SAM, RAINE, PRP;  Surgeon: Rodolfo Castellon,  MD;  Location: [facility];  Service: Cardiothoracic;  Laterality: N/A;    CORONARY ARTERY BYPASS GRAFT  4/29/2022    Procedure: ;  Surgeon: Rodolfo Castellon MD;  Location: [facility];  Service: Cardiothoracic;;    DILATATION AND CURETTAGE      X2    UMBILICAL HERNIA REPAIR                          PT Assessment/Plan       Row Name 03/01/24 1000          PT Assessment    Assessment Comments Patient continues to do well with PT at this time. Patient is scheduled to see her Ortho next Tuesday following her PT appointment.  -AS        PT Plan    PT Plan Comments Continue with current treatment plan.  -AS               User Key  (r) = Recorded By, (t) = Taken By, (c) = Cosigned By      Initials Name Provider Type    AS Hussein Walton, PT Physical Therapist                     Modalities       Row Name 03/01/24 1000             Moist Heat    MH Applied Yes  -AS      Location Left Shoulder - Sitting  -AS      PT Moist Heat Minutes 10  -AS      MH Prior to Rx Yes  -AS         Functional Testing    Outcome Measure Options Quick DASH  -AS                User Key  (r) = Recorded By, (t) = Taken By, (c) = Cosigned By      Initials Name Provider Type    AS Hussein Walton, PT Physical Therapist                   OP Exercises       Row Name 03/01/24 1049 03/01/24 1000          Subjective    Subjective Comments -- Patient states her shoulder is doing better. She states she is scheduled to see her Ortho next Tuesday.  -AS        Total Minutes    32724 - PT Therapeutic Exercise Minutes 25  -AS --        Exercise 1    Exercise Name 1 -- 3-Way Cane  -AS     Reps 1 -- --  -AS     Time 1 -- HEP  -AS        Exercise 2    Exercise Name 2 -- Pulley's - Flex & ABD  -AS     Time 2 -- 3 min each  -AS        Exercise 3    Exercise Name 3 -- S/L ER  -AS     Reps 3 -- Unable  -AS        Exercise 4    Exercise Name 4 -- Empty/Full Can  -AS     Reps 4 -- --  -AS        Exercise 5    Exercise Name 5 -- Rows  -AS     Reps 5 --  25  -AS     Time 5 -- Blue LF  -AS        Exercise 6    Exercise Name 6 -- Extensions  -AS     Reps 6 -- 25  -AS     Time 6 -- Blue LF  -AS        Exercise 7    Exercise Name 7 -- IR  -AS     Reps 7 -- 25  -AS     Time 7 -- Green LF  -AS        Exercise 8    Exercise Name 8 -- ER  -AS     Reps 8 -- 25  -AS     Time 8 -- Red LF  -AS               User Key  (r) = Recorded By, (t) = Taken By, (c) = Cosigned By      Initials Name Provider Type    AS Hussein Walton, PT Physical Therapist                             Manual Rx (last 36 hours)       Manual Treatments       Row Name 03/01/24 0900             Manual Rx 1    Manual Rx 1 Location Left Shoulder  -AS      Manual Rx 1 Type PROM - Flex, ABD, IR, ER  -AS      Manual Rx 1 Duration 10 min  -AS                User Key  (r) = Recorded By, (t) = Taken By, (c) = Cosigned By      Initials Name Provider Type    AS Hussein Walton, PT Physical Therapist                             Outcome Measure Options: Quick DASH         Time Calculation:   Start Time: 1013  Stop Time: 1049  Time Calculation (min): 36 min  Timed Charges  79726 - PT Therapeutic Exercise Minutes: 25  Untimed Charges  PT Moist Heat Minutes: 10  Total Minutes  Timed Charges Total Minutes: 25  Untimed Charges Total Minutes: 10   Total Minutes: 35  Therapy Charges for Today       Code Description Service Date Service Provider Modifiers Qty    70404508174  PT THER PROC EA 15 MIN 3/1/2024 Hussein Walton, PT GP 2            PT G-Codes  Outcome Measure Options: Wnada Walton, PT  3/1/2024

## 2024-03-04 ENCOUNTER — OFFICE VISIT (OUTPATIENT)
Dept: CARDIOLOGY | Facility: CLINIC | Age: 69
End: 2024-03-04
Payer: MEDICARE

## 2024-03-04 VITALS — SYSTOLIC BLOOD PRESSURE: 150 MMHG | DIASTOLIC BLOOD PRESSURE: 70 MMHG | HEART RATE: 70 BPM

## 2024-03-04 DIAGNOSIS — I25.810 CORONARY ARTERY DISEASE INVOLVING CORONARY BYPASS GRAFT OF NATIVE HEART WITHOUT ANGINA PECTORIS: Primary | ICD-10-CM

## 2024-03-04 DIAGNOSIS — G47.33 OBSTRUCTIVE SLEEP APNEA, ADULT: ICD-10-CM

## 2024-03-04 DIAGNOSIS — I10 ESSENTIAL HYPERTENSION: ICD-10-CM

## 2024-03-04 DIAGNOSIS — E78.00 HYPERCHOLESTEROLEMIA: ICD-10-CM

## 2024-03-04 PROCEDURE — 3078F DIAST BP <80 MM HG: CPT | Performed by: INTERNAL MEDICINE

## 2024-03-04 PROCEDURE — 99214 OFFICE O/P EST MOD 30 MIN: CPT | Performed by: INTERNAL MEDICINE

## 2024-03-04 PROCEDURE — 93000 ELECTROCARDIOGRAM COMPLETE: CPT | Performed by: INTERNAL MEDICINE

## 2024-03-04 PROCEDURE — 3077F SYST BP >= 140 MM HG: CPT | Performed by: INTERNAL MEDICINE

## 2024-03-04 NOTE — PROGRESS NOTES
PATIENTINFORMATION    Date of Office Visit: 2024  Encounter Provider: Jon Guerrier MD  Place of Service: [facility] CARDIOLOGY  Patient Name: Scarlett Ray  : 1955    Subjective:     Encounter Date:2024      Patient ID: Scarlett Ray is a 68 y.o. female.    No chief complaint on file.    HPI  Ms. Ray is a pleasant 68 years old lady who came cardiology clinic for follow-up visit.  She had a mechanical fall in January dislocating her left shoulder that was reduced with sedation and she is doing physical therapy now.  No significant other injuries.  She has no been walking much for fear of fall but denies any rest or exertional chest pain, change in her breathing.  No orthopnea, PND, palpitations, presyncope syncope or extremity swelling.  She actually has not used Lasix for a long time now.  Compliant with all current medications including her CPAP machine.      ROS  All systems reviewed and negative except as noted in HPI.    Past Medical History:   Diagnosis Date    Asthma     Elevated cholesterol     H/O seasonal allergies     HLD (hyperlipidemia)     Hypertension     Obesity     NONI (obstructive sleep apnea)     compliant w/ CPAP    Type 2 diabetes mellitus     on metformin at home       Past Surgical History:   Procedure Laterality Date    BREAST BIOPSY Left     CARDIAC CATHETERIZATION N/A 2022    Procedure: Left Heart Cath;  Surgeon: Ada Martinez MD;  Location[facility] CATH INVASIVE LOC[facility];  Service: Cardiology;  Laterality: N/A;    CARDIAC CATHETERIZATION N/A 2022    Procedure: Left ventriculography;  Surgeon: Ada Martinez MD;  Location:  [facility] CATH INVASIVE LOCATION;  Service: Cardiology;  Laterality: N/A;    CARDIAC CATHETERIZATION N/A 2022    Procedure: Coronary angiography;  Surgeon: Ada Martinez MD;  Location:  [facility] CATH INVASIVE LOCATION;  Service: Cardiology;  Laterality: N/A;     SECTION      CORONARY ARTERY BYPASS GRAFT  N/A 4/29/2022    Procedure: MIDLINE STERNOTOMY, CORONARY ARTERY BYPASS GRAFTING X3 , UTILIZING THE RIGHT SAPHENOUS VEIN AND LEFT SAM, RAINE, PRP;  Surgeon: Rodolfo Castellon MD;  Location: [facility];  Service: Cardiothoracic;  Laterality: N/A;    CORONARY ARTERY BYPASS GRAFT  4/29/2022    Procedure: ;  Surgeon: Rodolfo Castellon MD;  Location: [facility];  Service: Cardiothoracic;;    DILATATION AND CURETTAGE      X2    UMBILICAL HERNIA REPAIR         Social History     Socioeconomic History    Marital status:    Tobacco Use    Smoking status: Never    Smokeless tobacco: Never    Tobacco comments:     no caffiene   Vaping Use    Vaping status: Never Used   Substance and Sexual Activity    Alcohol use: No    Drug use: No    Sexual activity: Not Currently     Birth control/protection: Post-menopausal       Family History   Problem Relation Age of Onset    Breast cancer Paternal Aunt     Ovarian cancer Neg Hx     Uterine cancer Neg Hx     Colon cancer Neg Hx     Deep vein thrombosis Neg Hx     Pulmonary embolism Neg Hx            ECG 12 Lead    Date/Time: 3/4/2024 10:55 AM  Performed by: Jon Guerrier MD    Authorized by: Jon Guerrier MD  Comparison: compared with previous ECG from 5/11/2022  Similar to previous ECG  Rhythm: sinus rhythm  Rate: normal  Conduction: conduction normal  ST Segments: ST segments normal  T Waves: T waves normal  QRS axis: normal  Other: no other findings    Clinical impression: normal ECG             Objective:     /70   Pulse 70  There is no height or weight on file to calculate BMI.     Constitutional:       General: Not in acute distress.     Appearance: Well-developed. Not diaphoretic.   Eyes:      Pupils: Pupils are equal, round, and reactive to light.   HENT:      Head: Normocephalic and atraumatic.   Neck:      Thyroid: No thyromegaly.   Pulmonary:      Effort: Pulmonary effort is normal. No respiratory distress.      Breath sounds: Normal  breath sounds. No wheezing. No rales.   Chest:      Chest wall: Not tender to palpatation.   Cardiovascular:      Normal rate. Regular rhythm.      Murmurs: There is a systolic murmur.      No gallop.    Pulses:     Intact distal pulses.   Edema:     Peripheral edema absent.   Abdominal:      General: Bowel sounds are normal. There is no distension.      Palpations: Abdomen is soft.      Tenderness: There is no guarding.   Musculoskeletal: Normal range of motion.         General: No deformity.      Cervical back: Normal range of motion and neck supple. Skin:     General: Skin is warm and dry.      Findings: No rash.   Neurological:      Mental Status: Alert and oriented to person, place, and time.      Cranial Nerves: No cranial nerve deficit.      Deep Tendon Reflexes: Reflexes are normal and symmetric.   Psychiatric:         Judgment: Judgment normal.         Review Of Data: I have reviewed pertinent recent labs, images and documents and pertinent findings included in HPI or assessment below.          Assessment/Plan:     Multivessel coronary artery status post CABG x3: LIMA to LAD, SVG to OM1, SVG to OM 2 on 4/29/2022 after presenting with unstable angina. Preserved left ventricular ejection fraction by echocardiogram done in April 2022  Type 2 diabetes mellitus-on metformin-A1c at goal per most recent lab  Hypercholesterolemia-lipid panel at goal on high intensity statin  Essential hypertension-  Morbidly obese with obstructive sleep apnea -compliant with CPAP  Bilateral leg and ankle edema-she takes as needed diuretic-swelling almost resolved and she has not used diuretic since last clinic visit.  Aortic area systolic murmur likely from aortic valve sclerosis-seems to have improved.  Preoperative cardiovascular exam for  D and C     Blood pressure above goal but she reports near normal readings at home.  Left shoulder pain may be contributing.  She will keep logs and call in 2 weeks-if persistently elevated may  add second agent to metoprolol.  Otherwise continue current care.  She is going to have D&C this month and from cardiac standpoint she can go ahead and get procedure.  Overall intermediate risk for perioperative cardiovascular complications because of underlying heart disease but she does not need any further cardiac specific testing or intervention.  Continue metoprolol in the periop period including morning of surgery with sips of water.    She will fu in cardiology clinic in 6 months or sooner with concerns    Diagnosis and plan of care discussed with patient and verbalized understanding.            Your medication list            Accurate as of March 4, 2024 10:55 AM. If you have any questions, ask your nurse or doctor.                CONTINUE taking these medications        Instructions Last Dose Given Next Dose Due   albuterol sulfate  (90 Base) MCG/ACT inhaler  Commonly known as: PROVENTIL HFA;VENTOLIN HFA;PROAIR HFA      Inhale 2 puffs Every 6 (Six) Hours As Needed for Shortness of Air or Wheezing.       aspirin 81 MG EC tablet      Take 1 tablet by mouth Daily.       atorvastatin 40 MG tablet  Commonly known as: LIPITOR      Take 1 tablet by mouth in the evening       Biotin 1000 MCG tablet      Take 1,000 mcg by mouth Daily.       calcium carbonate 600 MG tablet  Commonly known as: OS-NUSRAT      Take 1 tablet by mouth Daily.       cholecalciferol 25 MCG (1000 UT) tablet  Commonly known as: VITAMIN D3      Take 2 tablets by mouth Daily.       diphenhydrAMINE 25 mg capsule  Commonly known as: BENADRYL      Take 1 capsule by mouth Every 6 (Six) Hours As Needed for Itching.       HYDROcodone-acetaminophen 5-325 MG per tablet  Commonly known as: NORCO      Take 1 tablet by mouth Every 6 (Six) Hours As Needed for Severe Pain.       meloxicam 7.5 MG tablet  Commonly known as: MOBIC      Take 1 tablet by mouth Daily.       metFORMIN  MG 24 hr tablet  Commonly known as: GLUCOPHAGE-XR      Take 1 tablet  by mouth 2 (Two) Times a Day for 90 days.       metoprolol tartrate 50 MG tablet  Commonly known as: LOPRESSOR      Take 1 tablet by mouth 2 (Two) Times a Day for 90 days.                    Jon Guerrier MD  03/04/24  10:55 EST

## 2024-03-05 ENCOUNTER — OFFICE VISIT (OUTPATIENT)
Dept: ORTHOPEDIC SURGERY | Facility: CLINIC | Age: 69
End: 2024-03-05
Payer: MEDICARE

## 2024-03-05 ENCOUNTER — HOSPITAL ENCOUNTER (OUTPATIENT)
Dept: PHYSICAL THERAPY | Facility: HOSPITAL | Age: 69
Setting detail: THERAPIES SERIES
Discharge: HOME | End: 2024-03-05
Payer: MEDICARE

## 2024-03-05 VITALS — WEIGHT: 258 LBS | BODY MASS INDEX: 47.48 KG/M2 | HEIGHT: 62 IN

## 2024-03-05 DIAGNOSIS — M75.02 ADHESIVE CAPSULITIS OF LEFT SHOULDER: ICD-10-CM

## 2024-03-05 DIAGNOSIS — S43.015D ANTERIOR DISLOCATION OF LEFT SHOULDER, SUBSEQUENT ENCOUNTER: Primary | ICD-10-CM

## 2024-03-05 DIAGNOSIS — S43.005A CLOSED DISLOCATION OF LEFT SHOULDER, INITIAL ENCOUNTER: Primary | ICD-10-CM

## 2024-03-05 PROCEDURE — 1159F MED LIST DOCD IN RCRD: CPT | Performed by: ORTHOPAEDIC SURGERY

## 2024-03-05 PROCEDURE — 73030 X-RAY EXAM OF SHOULDER: CPT | Performed by: ORTHOPAEDIC SURGERY

## 2024-03-05 PROCEDURE — 97110 THERAPEUTIC EXERCISES: CPT

## 2024-03-05 PROCEDURE — 1160F RVW MEDS BY RX/DR IN RCRD: CPT | Performed by: ORTHOPAEDIC SURGERY

## 2024-03-05 PROCEDURE — 99213 OFFICE O/P EST LOW 20 MIN: CPT | Performed by: ORTHOPAEDIC SURGERY

## 2024-03-05 PROCEDURE — 97140 MANUAL THERAPY 1/> REGIONS: CPT

## 2024-03-05 NOTE — THERAPY TREATMENT NOTE
Outpatient Physical Therapy Ortho Treatment Note   Rivka Steven     Patient Name: Scarlett Ray  : 1955  MRN: [id number]  Today's Date: 3/5/2024      Visit Date: 2024    Visit Dx:    ICD-10-CM ICD-9-CM   1. Closed dislocation of left shoulder, initial encounter  S43.005A 831.00       Patient Active Problem List   Diagnosis    Obstructive sleep apnea, adult    Essential hypertension    Cor pulmonale    Chest pain    Chest pain, unspecified type    Coronary artery disease involving native coronary artery of native heart with unstable angina pectoris    Abnormal findings on diagnostic imaging of heart and coronary circulation    Coronary artery disease involving coronary bypass graft of native heart without angina pectoris    PMB (postmenopausal bleeding)    BMI 45.0-49.9, adult    Hypercholesterolemia        Past Medical History:   Diagnosis Date    Asthma     Elevated cholesterol     H/O seasonal allergies     HLD (hyperlipidemia)     Hypertension     Obesity     NONI (obstructive sleep apnea)     compliant w/ CPAP    Type 2 diabetes mellitus     on metformin at home        Past Surgical History:   Procedure Laterality Date    BREAST BIOPSY Left     CARDIAC CATHETERIZATION N/A 2022    Procedure: Left Heart Cath;  Surgeon: Ada Martinez MD;  Location: [facility] CATH INVASIVE LOCATION;  Service: Cardiology;  Laterality: N/A;    CARDIAC CATHETERIZATION N/A 2022    Procedure: Left ventriculography;  Surgeon: Ada Martinez MD;  Location:  [facility] CATH INVASIVE LOCATION;  Service: Cardiology;  Laterality: N/A;    CARDIAC CATHETERIZATION N/A 2022    Procedure: Coronary angiography;  Surgeon: Ada Martinez MD;  Location:  [facility] CATH INVASIVE LOCATION;  Service: Cardiology;  Laterality: N/A;     SECTION      CORONARY ARTERY BYPASS GRAFT N/A 2022    Procedure: MIDLINE STERNOTOMY, CORONARY ARTERY BYPASS GRAFTING X3 , UTILIZING THE RIGHT SAPHENOUS VEIN AND LEFT SAM, RAINE, PRP;  Surgeon:  Rodolfo Castellon MD;  Location: [facility];  Service: Cardiothoracic;  Laterality: N/A;    CORONARY ARTERY BYPASS GRAFT  4/29/2022    Procedure: ;  Surgeon: Rodolfo Castellon MD;  Location: [facility];  Service: Cardiothoracic;;    DILATATION AND CURETTAGE      X2    UMBILICAL HERNIA REPAIR          PT Ortho       Row Name 03/05/24 1000       Left Upper Ext    Lt Shoulder Abduction AROM 110  -AS    Lt Shoulder Abduction PROM 160  -AS    Lt Shoulder Flexion AROM 120  -AS    Lt Shoulder Flexion PROM 160  -AS    Lt Shoulder External Rotation PROM 75  -AS    Lt Shoulder Internal Rotation PROM 75  -AS              User Key  (r) = Recorded By, (t) = Taken By, (c) = Cosigned By      Initials Name Provider Type    AS Hussein Walton, PT Physical Therapist                                 PT Assessment/Plan       Row Name 03/05/24 1000          PT Assessment    Assessment Comments Patient continues to do well with PT at this time. Patient's ROM and strength continue to improve, as well as her use and function with her LUE. Patient is scheduled to see her Ortho this morning. Patient and PT feel she is able to continue on a HEP only at this time if her Ortho agrees.  -AS        PT Plan    PT Plan Comments Patient to see her Ortho this morning. Will continue with outpatient PT as advised.  -AS               User Key  (r) = Recorded By, (t) = Taken By, (c) = Cosigned By      Initials Name Provider Type    AS Hussein Walton, PT Physical Therapist                     Modalities       Row Name 03/05/24 1000             Moist Heat    MH Applied Yes  -AS      Location Left Shoulder - Sitting  -AS      PT Moist Heat Minutes 10  -AS      MH Prior to Rx Yes  -AS         Functional Testing    Outcome Measure Options Quick DASH  -AS                User Key  (r) = Recorded By, (t) = Taken By, (c) = Cosigned By      Initials Name Provider Type    AS Hussein Walton, PT Physical Therapist                   OP  "Exercises       Row Name 03/05/24 1032 03/05/24 1000          Subjective    Subjective Comments -- Patient states her shoulder continues to improve and is \"feeling good today.\" She states she is scheduled to see her Ortho this morning at 11:00.  -AS        Total Minutes    15273 - PT Therapeutic Exercise Minutes 15  -AS --     11055 - PT Manual Therapy Minutes 10  -AS --        Exercise 1    Exercise Name 1 -- 3-Way Cane  -AS     Time 1 -- HEP  -AS        Exercise 2    Exercise Name 2 -- Pulley's - Flex & ABD  -AS     Time 2 -- 3 min each  -AS        Exercise 3    Exercise Name 3 -- S/L ER  -AS     Reps 3 -- Unable  -AS        Exercise 4    Exercise Name 4 -- Empty/Full Can  -AS        Exercise 5    Exercise Name 5 -- Rows  -AS     Reps 5 -- 25  -AS     Time 5 -- Blue LF  -AS        Exercise 6    Exercise Name 6 -- Extensions  -AS     Reps 6 -- 25  -AS     Time 6 -- Blue LF  -AS        Exercise 7    Exercise Name 7 -- IR  -AS     Reps 7 -- 25  -AS     Time 7 -- Green LF  -AS        Exercise 8    Exercise Name 8 -- ER  -AS     Reps 8 -- 25  -AS     Time 8 -- Red LF  -AS               User Key  (r) = Recorded By, (t) = Taken By, (c) = Cosigned By      Initials Name Provider Type    AS Hussein Walton, PT Physical Therapist                             Manual Rx (Last 36 Hours)       Manual Treatments       Row Name 03/05/24 1032 03/05/24 0900          Total Minutes    96333 - PT Manual Therapy Minutes 10  -AS --        Manual Rx 1    Manual Rx 1 Location -- Left Shoulder  -AS     Manual Rx 1 Type -- PROM - Flex, ABD, IR, ER  -AS     Manual Rx 1 Duration -- 10 min  -AS               User Key  (r) = Recorded By, (t) = Taken By, (c) = Cosigned By      Initials Name Provider Type    AS Hussein Walton, PT Physical Therapist                             Outcome Measure Options: Quick DASH         Time Calculation:   Start Time: 0954  Stop Time: 1032  Time Calculation (min): 38 min  Timed Charges  11827 - PT " Therapeutic Exercise Minutes: 15  13775 - PT Manual Therapy Minutes: 10  Untimed Charges  PT Moist Heat Minutes: 10  Total Minutes  Timed Charges Total Minutes: 25  Untimed Charges Total Minutes: 10   Total Minutes: 35  Therapy Charges for Today       Code Description Service Date Service Provider Modifiers Qty    61452750659 HC PT THER PROC EA 15 MIN 3/5/2024 Hussein Walton, PT GP 1    02842004085 HC PT MANUAL THERAPY EA 15 MIN 3/5/2024 Hussein Walton, PT GP 1            PT G-Codes  Outcome Measure Options: Quick DASH         Hussein Walton, PT  3/5/2024

## 2024-03-05 NOTE — PROGRESS NOTES
Subjective: Left shoulder anterior dislocation, subsequent encounter     Patient ID: Scarlett Ray is a 68 y.o. female.    Chief Complaint:    History of Present Illness 68-year-old female is now approximately 2 months out from her injury and is making progress albeit slow.  Having less pain and discomfort has been involved with physical therapy.  Is afraid to lift her arm up overhead and other type activity such as wearing a bra or other clothing for fear of reinjury to the shoulder.  She is now taking Tylenol for the pain.        Social History     Occupational History    Not on file   Tobacco Use    Smoking status: Never     Passive exposure: Never    Smokeless tobacco: Never    Tobacco comments:     no caffiene   Vaping Use    Vaping status: Never Used   Substance and Sexual Activity    Alcohol use: No    Drug use: No    Sexual activity: Not Currently     Birth control/protection: Post-menopausal      Review of Systems   Constitutional:  Negative for chills, diaphoresis, fever and unexpected weight change.   HENT:  Negative for hearing loss, nosebleeds, sore throat and tinnitus.    Eyes:  Negative for pain and visual disturbance.   Respiratory:  Negative for cough, shortness of breath and wheezing.    Cardiovascular:  Negative for chest pain and palpitations.   Gastrointestinal:  Negative for abdominal pain, diarrhea, nausea and vomiting.   Endocrine: Negative for cold intolerance, heat intolerance and polydipsia.   Genitourinary:  Negative for difficulty urinating, dysuria and hematuria.   Musculoskeletal:  Positive for arthralgias and myalgias. Negative for joint swelling.   Skin:  Negative for rash and wound.   Allergic/Immunologic: Negative for environmental allergies.   Neurological:  Negative for dizziness, syncope and numbness.   Hematological:  Does not bruise/bleed easily.   Psychiatric/Behavioral:  Negative for dysphoric mood and sleep disturbance. The patient is not nervous/anxious.          Past  Medical History:   Diagnosis Date    Asthma     Elevated cholesterol     H/O seasonal allergies     HLD (hyperlipidemia)     Hypertension     Obesity     NONI (obstructive sleep apnea)     compliant w/ CPAP    Type 2 diabetes mellitus     on metformin at home     Past Surgical History:   Procedure Laterality Date    BREAST BIOPSY Left     CARDIAC CATHETERIZATION N/A 2022    Procedure: Left Heart Cath;  Surgeon: Ada Martinez MD;  Location: [facility] CATH INVASIVE LOCATION;  Service: Cardiology;  Laterality: N/A;    CARDIAC CATHETERIZATION N/A 2022    Procedure: Left ventriculography;  Surgeon: Ada Martinez MD;  Location: [facility] CATH INVASIVE LOCATION;  Service: Cardiology;  Laterality: N/A;    CARDIAC CATHETERIZATION N/A 2022    Procedure: Coronary angiography;  Surgeon: Ada Martinez MD;  Location: [facility] CATH INVASIVE LOCATION;  Service: Cardiology;  Laterality: N/A;     SECTION      CORONARY ARTERY BYPASS GRAFT N/A 2022    Procedure: MIDLINE STERNOTOMY, CORONARY ARTERY BYPASS GRAFTING X3 , UTILIZING THE RIGHT SAPHENOUS VEIN AND LEFT SAM, RAINE, PRP;  Surgeon: Rodolfo Castellon MD;  Location: [facility];  Service: Cardiothoracic;  Laterality: N/A;    CORONARY ARTERY BYPASS GRAFT  2022    Procedure: ;  Surgeon: Rodolfo Castellon MD;  Location: [facility];  Service: Cardiothoracic;;    DILATATION AND CURETTAGE      X2    UMBILICAL HERNIA REPAIR       Family History   Problem Relation Age of Onset    Breast cancer Paternal Aunt     Ovarian cancer Neg Hx     Uterine cancer Neg Hx     Colon cancer Neg Hx     Deep vein thrombosis Neg Hx     Pulmonary embolism Neg Hx          Objective:  There were no vitals filed for this visit.      24  1051   Weight: 117 kg (258 lb)     Body mass index is 47.19 kg/m².        Ortho Exam   AP lateral and oblique show the shoulder in a reduced position.  No changes compared to prior x-rays.  She is alert and oriented x 3.  There is no  motor or sensory deficit.  She can abduct and extend about 90 degrees past which she is very stiff and she is developing some adhesive capsulitis.  In 90 degrees against resistance rotator cuff function is probably 4 out of 5.  Mild pain on crossarm test.  Mildly positive Coy sign.  She has good capillary refill.    Assessment:        1. Anterior dislocation of left shoulder, subsequent encounter    2. Adhesive capsulitis of left shoulder           Plan: I did advise as far as dressing she is aware of what ever quality she wants to.  She can use the arm as pain permits.  I did advise her to continue physical therapy but also advised her on stretching exercises in the shower such as walking the wall to help regain the motion if she is developing a component of adhesive capsulitis.  Return to see me in 4 weeks.  Answered all questions            Work Status:    MICKY query complete.    Orders:  Orders Placed This Encounter   Procedures    XR Shoulder 2+ View Left       Medications:  No orders of the defined types were placed in this encounter.      Followup:  Return in about 4 weeks (around 4/2/2024).          Dictated utilizing Dragon dictation

## 2024-03-14 ENCOUNTER — PRE-ADMISSION TESTING (OUTPATIENT)
Dept: PREADMISSION TESTING | Facility: HOSPITAL | Age: 69
End: 2024-03-14
Payer: MEDICARE

## 2024-03-14 VITALS
RESPIRATION RATE: 16 BRPM | WEIGHT: 259.6 LBS | BODY MASS INDEX: 47.77 KG/M2 | DIASTOLIC BLOOD PRESSURE: 82 MMHG | SYSTOLIC BLOOD PRESSURE: 162 MMHG | HEART RATE: 72 BPM | OXYGEN SATURATION: 95 % | HEIGHT: 62 IN

## 2024-03-14 DIAGNOSIS — R74.8 ELEVATED LIVER ENZYMES: ICD-10-CM

## 2024-03-14 LAB
ALBUMIN SERPL-MCNC: 3.7 G/DL (ref 3.5–5.2)
ALBUMIN/GLOB SERPL: 1 G/DL
ALP SERPL-CCNC: 246 U/L (ref 39–117)
ALT SERPL W P-5'-P-CCNC: 8 U/L (ref 1–33)
AMYLASE SERPL-CCNC: 56 U/L (ref 28–100)
ANION GAP SERPL CALCULATED.3IONS-SCNC: 11.1 MMOL/L (ref 5–15)
AST SERPL-CCNC: 11 U/L (ref 1–32)
BILIRUB SERPL-MCNC: 0.4 MG/DL (ref 0–1.2)
BUN SERPL-MCNC: 12 MG/DL (ref 8–23)
BUN/CREAT SERPL: 18.8 (ref 7–25)
CALCIUM SPEC-SCNC: 9.3 MG/DL (ref 8.6–10.5)
CHLORIDE SERPL-SCNC: 100 MMOL/L (ref 98–107)
CO2 SERPL-SCNC: 24.9 MMOL/L (ref 22–29)
CREAT SERPL-MCNC: 0.64 MG/DL (ref 0.57–1)
DEPRECATED RDW RBC AUTO: 44.8 FL (ref 37–54)
EGFRCR SERPLBLD CKD-EPI 2021: 95.8 ML/MIN/1.73
ERYTHROCYTE [DISTWIDTH] IN BLOOD BY AUTOMATED COUNT: 14 % (ref 12.3–15.4)
GLOBULIN UR ELPH-MCNC: 3.6 GM/DL
GLUCOSE SERPL-MCNC: 154 MG/DL (ref 65–99)
HBA1C MFR BLD: 6.3 % (ref 4.8–5.6)
HCT VFR BLD AUTO: 41 % (ref 34–46.6)
HGB BLD-MCNC: 12.9 G/DL (ref 12–15.9)
LIPASE SERPL-CCNC: 18 U/L (ref 13–60)
MCH RBC QN AUTO: 27.8 PG (ref 26.6–33)
MCHC RBC AUTO-ENTMCNC: 31.5 G/DL (ref 31.5–35.7)
MCV RBC AUTO: 88.4 FL (ref 79–97)
PLATELET # BLD AUTO: 348 10*3/MM3 (ref 140–450)
PMV BLD AUTO: 10.5 FL (ref 6–12)
POTASSIUM SERPL-SCNC: 3.9 MMOL/L (ref 3.5–5.2)
PROT SERPL-MCNC: 7.3 G/DL (ref 6–8.5)
RBC # BLD AUTO: 4.64 10*6/MM3 (ref 3.77–5.28)
SODIUM SERPL-SCNC: 136 MMOL/L (ref 136–145)
WBC NRBC COR # BLD AUTO: 8.85 10*3/MM3 (ref 3.4–10.8)

## 2024-03-14 PROCEDURE — 85027 COMPLETE CBC AUTOMATED: CPT | Performed by: OBSTETRICS & GYNECOLOGY

## 2024-03-14 PROCEDURE — 36415 COLL VENOUS BLD VENIPUNCTURE: CPT

## 2024-03-14 PROCEDURE — 80053 COMPREHEN METABOLIC PANEL: CPT | Performed by: OBSTETRICS & GYNECOLOGY

## 2024-03-14 PROCEDURE — 83036 HEMOGLOBIN GLYCOSYLATED A1C: CPT | Performed by: OBSTETRICS & GYNECOLOGY

## 2024-03-14 PROCEDURE — 83690 ASSAY OF LIPASE: CPT | Performed by: OBSTETRICS & GYNECOLOGY

## 2024-03-14 PROCEDURE — 82150 ASSAY OF AMYLASE: CPT | Performed by: OBSTETRICS & GYNECOLOGY

## 2024-03-14 RX ORDER — ACETAMINOPHEN 325 MG/1
650 TABLET ORAL EVERY 6 HOURS PRN
COMMUNITY
End: 2024-03-19 | Stop reason: HOSPADM

## 2024-03-14 NOTE — PAT
Pt here for PAT visit.  Pre-op tests completed, chg soap given, and instructions reviewed.  Instructed clears until 5:30 am dos and to bring cpap, voiced understanding. Cardiac clearance in notes.

## 2024-03-14 NOTE — DISCHARGE INSTRUCTIONS
PRE-ADMISSION TESTING INSTRUCTIONS FOR ADULTS    Take these medications the morning of surgery with a small sip of water:  use your inhaler and take metoprolol      Do not take any insulin or diabetes medications the morning of surgery.      No aspirin, advil, aleve, ibuprofen, naproxen, diet pills, decongestants, or herbal/vitamins for a week prior to surgery.   Stop vitamins and supplements today.    Tylenol/Acetaminophen is okay to take if needed.    General Instructions:    DO NOT EAT SOLID FOOD AFTER MIDNIGHT THE NIGHT BEFORE SURGERY. No gum, mints, or hard candy after midnight the night before surgery.  You may drink clear liquids the day of surgery up until 2 hours before your arrival time.  (5:30 am)  Clear liquids are liquids you can see through. Nothing RED in color.    Plain water    Sports drinks      Gelatin (Jell-O)  Fruit juices without pulp such as white grape juice and apple juice  Popsicles that contain no fruit or yogurt  Tea or coffee (no cream or milk added)    It is beneficial for you to have a clear drink that contains carbohydrates 2 hours before your arrival time.  We suggest a 20 ounce bottle of Gatorade or Powerade for non-diabetic patients or a 20 ounce bottle of Gatorade Zero or Powerade Zero for diabetic patients.   (5:30 am)    Patients who avoid smoking, chewing tobacco and alcohol for 4 weeks prior to surgery have a reduced risk of post-operative complications.  If at all possible, quit smoking as many days before surgery as you can.    Do not smoke, use chewing tobacco or drink alcohol the day of surgery    Bring your C-PAP/ BI-PAP machine if you use one.  Wear clean comfortable clothes.  Do not wear contact lenses, lotion, deodorant, or make-up.  Bring a case for your glasses if applicable. You may brush your teeth the morning of surgery.  You may wear dentures/partials, do not put adhesive/glue on them.  Leave all other jewelry and valuables at home.      Preventing a Surgical  Site Infection:    Shower the night before and on the morning of surgery using the chlorhexidine soap you were given.  Use a clean washcloth with the soap.  Place clean sheets on your bed after showering the night before surgery. Do not use the CHG soap on your hair, face, or private areas. Wash your body gently for five (5) minutes. Do not scrub your skin.  Dry with a clean towel and dress in clean clothing.  Do not shave the surgical area for 10 days-2 weeks prior to surgery  because the razor can irritate skin and make it easier to develop an infection.  Make sure you, your family, and all healthcare providers clean their hands with soap and water or an alcohol based hand  before caring for you or your wound.      Day of surgery:    Your surgeon’s office will advise you of your arrival time for the day of surgery.    Upon arrival, a Pre-op nurse and Anesthesia provider will review your health history, obtain vital signs, and answer questions you may have. The anesthesia provider will also discuss the type of anesthesia that will be needed for your procedure, which may include general anesthesia. The only belongings needed at this time will be your home medications and if applicable your C-PAP/BI-PAP machine.  If you are staying overnight your family can leave the rest of your belongings in the car and bring them to your room later.  A Pre-op nurse will start an IV and you may receive medication in preparation for surgery, including something to help you relax.  Your family will be able to see you in the Pre-op area.  While you are in surgery your family should notify the waiting room  if they leave the waiting room area and provide a contact phone number.    IF you have any questions, you can call the Pre-Admission Department at [phone] or your surgeon's office.  Notify your surgeon if  you become sick, have a fever, productive cough, or cannot be here the day of surgery    Please be  aware that surgery does come with discomfort.  We want to make every effort to control your discomfort so please discuss any uncontrolled symptoms with your nurse.   Your doctor will most likely have prescribed pain medications.      If you are going home after surgery, you will receive individualized written care instructions before being discharged.  A responsible adult (over the age of 18) must drive you to and from the hospital on the day of your surgery and stay with you for 24 hours after anesthesia.    If you are staying overnight following surgery, you will be transported to your hospital room following the recovery period.  [facility] has all private rooms.    You may receive a survey regarding the care you received. Your feedback is very important and will be used to collect the necessary data to help us to continue to provide excellent care.     Deductibles and co-payments are collected on the day of service. Please be prepared to pay the required co-pay, deductible or deposit on the day of service as defined by your plan.

## 2024-03-18 ENCOUNTER — ANESTHESIA EVENT (OUTPATIENT)
Dept: PERIOP | Facility: HOSPITAL | Age: 69
End: 2024-03-18
Payer: MEDICARE

## 2024-03-19 ENCOUNTER — ANESTHESIA (OUTPATIENT)
Dept: PERIOP | Facility: HOSPITAL | Age: 69
End: 2024-03-19
Payer: MEDICARE

## 2024-03-19 ENCOUNTER — HOSPITAL ENCOUNTER (OUTPATIENT)
Facility: HOSPITAL | Age: 69
Setting detail: HOSPITAL OUTPATIENT SURGERY
Discharge: HOME | End: 2024-03-19
Attending: OBSTETRICS & GYNECOLOGY | Admitting: OBSTETRICS & GYNECOLOGY
Payer: MEDICARE

## 2024-03-19 VITALS
BODY MASS INDEX: 46.82 KG/M2 | WEIGHT: 256 LBS | SYSTOLIC BLOOD PRESSURE: 148 MMHG | DIASTOLIC BLOOD PRESSURE: 79 MMHG | TEMPERATURE: 97.8 F | HEART RATE: 77 BPM | OXYGEN SATURATION: 92 % | RESPIRATION RATE: 18 BRPM

## 2024-03-19 DIAGNOSIS — N95.0 PMB (POSTMENOPAUSAL BLEEDING): ICD-10-CM

## 2024-03-19 DIAGNOSIS — Z98.890 S/P D&C (STATUS POST DILATION AND CURETTAGE): Primary | ICD-10-CM

## 2024-03-19 LAB — GLUCOSE BLDC GLUCOMTR-MCNC: 122 MG/DL (ref 70–130)

## 2024-03-19 PROCEDURE — C1782 MORCELLATOR: HCPCS | Performed by: OBSTETRICS & GYNECOLOGY

## 2024-03-19 PROCEDURE — 25810000003 SODIUM CHLORIDE PER 500 ML: Performed by: OBSTETRICS & GYNECOLOGY

## 2024-03-19 PROCEDURE — 25010000002 DEXAMETHASONE PER 1 MG

## 2024-03-19 PROCEDURE — 25010000002 ONDANSETRON PER 1 MG

## 2024-03-19 PROCEDURE — 25810000003 LACTATED RINGERS PER 1000 ML

## 2024-03-19 PROCEDURE — 58558 HYSTEROSCOPY BIOPSY: CPT | Performed by: OBSTETRICS & GYNECOLOGY

## 2024-03-19 PROCEDURE — 82948 REAGENT STRIP/BLOOD GLUCOSE: CPT

## 2024-03-19 PROCEDURE — S0260 H&P FOR SURGERY: HCPCS | Performed by: OBSTETRICS & GYNECOLOGY

## 2024-03-19 PROCEDURE — 25010000002 CEFAZOLIN PER 500 MG: Performed by: OBSTETRICS & GYNECOLOGY

## 2024-03-19 PROCEDURE — 88305 TISSUE EXAM BY PATHOLOGIST: CPT | Performed by: OBSTETRICS & GYNECOLOGY

## 2024-03-19 PROCEDURE — 25010000002 FENTANYL CITRATE (PF) 50 MCG/ML SOLUTION: Performed by: NURSE ANESTHETIST, CERTIFIED REGISTERED

## 2024-03-19 PROCEDURE — 25010000002 PROPOFOL 1000 MG/100ML EMULSION: Performed by: NURSE ANESTHETIST, CERTIFIED REGISTERED

## 2024-03-19 RX ORDER — SODIUM CHLORIDE 9 MG/ML
40 INJECTION, SOLUTION INTRAVENOUS AS NEEDED
Status: DISCONTINUED | OUTPATIENT
Start: 2024-03-19 | End: 2024-03-19 | Stop reason: HOSPADM

## 2024-03-19 RX ORDER — SODIUM CHLORIDE 9 MG/ML
INJECTION, SOLUTION INTRAVENOUS AS NEEDED
Status: DISCONTINUED | OUTPATIENT
Start: 2024-03-19 | End: 2024-03-19 | Stop reason: HOSPADM

## 2024-03-19 RX ORDER — HYDROCODONE BITARTRATE AND ACETAMINOPHEN 5; 325 MG/1; MG/1
1 TABLET ORAL EVERY 6 HOURS PRN
Qty: 15 TABLET | Refills: 0 | Status: SHIPPED | OUTPATIENT
Start: 2024-03-19

## 2024-03-19 RX ORDER — FENTANYL CITRATE 50 UG/ML
25 INJECTION, SOLUTION INTRAMUSCULAR; INTRAVENOUS
Status: DISCONTINUED | OUTPATIENT
Start: 2024-03-19 | End: 2024-03-19 | Stop reason: HOSPADM

## 2024-03-19 RX ORDER — SODIUM CHLORIDE, SODIUM LACTATE, POTASSIUM CHLORIDE, CALCIUM CHLORIDE 600; 310; 30; 20 MG/100ML; MG/100ML; MG/100ML; MG/100ML
9 INJECTION, SOLUTION INTRAVENOUS CONTINUOUS
Status: DISCONTINUED | OUTPATIENT
Start: 2024-03-19 | End: 2024-03-19 | Stop reason: HOSPADM

## 2024-03-19 RX ORDER — SODIUM CHLORIDE 0.9 % (FLUSH) 0.9 %
10 SYRINGE (ML) INJECTION EVERY 12 HOURS SCHEDULED
Status: DISCONTINUED | OUTPATIENT
Start: 2024-03-19 | End: 2024-03-19 | Stop reason: HOSPADM

## 2024-03-19 RX ORDER — SODIUM CHLORIDE 0.9 % (FLUSH) 0.9 %
10 SYRINGE (ML) INJECTION AS NEEDED
Status: DISCONTINUED | OUTPATIENT
Start: 2024-03-19 | End: 2024-03-19 | Stop reason: HOSPADM

## 2024-03-19 RX ORDER — ONDANSETRON 2 MG/ML
4 INJECTION INTRAMUSCULAR; INTRAVENOUS ONCE AS NEEDED
Status: COMPLETED | OUTPATIENT
Start: 2024-03-19 | End: 2024-03-19

## 2024-03-19 RX ORDER — FAMOTIDINE 10 MG/ML
20 INJECTION, SOLUTION INTRAVENOUS
Status: COMPLETED | OUTPATIENT
Start: 2024-03-19 | End: 2024-03-19

## 2024-03-19 RX ORDER — LIDOCAINE HYDROCHLORIDE 20 MG/ML
INJECTION, SOLUTION INFILTRATION; PERINEURAL AS NEEDED
Status: DISCONTINUED | OUTPATIENT
Start: 2024-03-19 | End: 2024-03-19 | Stop reason: SURG

## 2024-03-19 RX ORDER — LIDOCAINE HYDROCHLORIDE 10 MG/ML
0.5 INJECTION, SOLUTION INFILTRATION; PERINEURAL ONCE AS NEEDED
Status: DISCONTINUED | OUTPATIENT
Start: 2024-03-19 | End: 2024-03-19 | Stop reason: HOSPADM

## 2024-03-19 RX ORDER — PROPOFOL 10 MG/ML
INJECTION, EMULSION INTRAVENOUS AS NEEDED
Status: DISCONTINUED | OUTPATIENT
Start: 2024-03-19 | End: 2024-03-19 | Stop reason: SURG

## 2024-03-19 RX ORDER — HYDROCODONE BITARTRATE AND ACETAMINOPHEN 5; 325 MG/1; MG/1
1 TABLET ORAL ONCE AS NEEDED
Status: COMPLETED | OUTPATIENT
Start: 2024-03-19 | End: 2024-03-19

## 2024-03-19 RX ORDER — DEXAMETHASONE SODIUM PHOSPHATE 4 MG/ML
8 INJECTION, SOLUTION INTRA-ARTICULAR; INTRALESIONAL; INTRAMUSCULAR; INTRAVENOUS; SOFT TISSUE ONCE AS NEEDED
Status: COMPLETED | OUTPATIENT
Start: 2024-03-19 | End: 2024-03-19

## 2024-03-19 RX ORDER — ONDANSETRON 2 MG/ML
4 INJECTION INTRAMUSCULAR; INTRAVENOUS ONCE AS NEEDED
Status: DISCONTINUED | OUTPATIENT
Start: 2024-03-19 | End: 2024-03-19 | Stop reason: HOSPADM

## 2024-03-19 RX ORDER — FENTANYL CITRATE 50 UG/ML
INJECTION, SOLUTION INTRAMUSCULAR; INTRAVENOUS AS NEEDED
Status: DISCONTINUED | OUTPATIENT
Start: 2024-03-19 | End: 2024-03-19 | Stop reason: SURG

## 2024-03-19 RX ORDER — SODIUM CHLORIDE, SODIUM LACTATE, POTASSIUM CHLORIDE, CALCIUM CHLORIDE 600; 310; 30; 20 MG/100ML; MG/100ML; MG/100ML; MG/100ML
100 INJECTION, SOLUTION INTRAVENOUS ONCE
Status: DISCONTINUED | OUTPATIENT
Start: 2024-03-19 | End: 2024-03-19 | Stop reason: HOSPADM

## 2024-03-19 RX ADMIN — PROPOFOL INJECTABLE EMULSION 50 MG: 10 INJECTION, EMULSION INTRAVENOUS at 08:47

## 2024-03-19 RX ADMIN — PROPOFOL INJECTABLE EMULSION 50 MG: 10 INJECTION, EMULSION INTRAVENOUS at 08:37

## 2024-03-19 RX ADMIN — CEFAZOLIN 2 G: 2 INJECTION, POWDER, FOR SOLUTION INTRAVENOUS at 08:35

## 2024-03-19 RX ADMIN — HYDROCODONE BITARTRATE AND ACETAMINOPHEN 1 TABLET: 5; 325 TABLET ORAL at 09:34

## 2024-03-19 RX ADMIN — LIDOCAINE HYDROCHLORIDE 80 MG: 20 INJECTION, SOLUTION INFILTRATION; PERINEURAL at 08:37

## 2024-03-19 RX ADMIN — FENTANYL CITRATE 25 MCG: 50 INJECTION, SOLUTION INTRAMUSCULAR; INTRAVENOUS at 08:47

## 2024-03-19 RX ADMIN — FAMOTIDINE 20 MG: 10 INJECTION, SOLUTION INTRAVENOUS at 08:02

## 2024-03-19 RX ADMIN — DEXAMETHASONE SODIUM PHOSPHATE 8 MG: 4 INJECTION, SOLUTION INTRAMUSCULAR; INTRAVENOUS at 08:03

## 2024-03-19 RX ADMIN — PROPOFOL INJECTABLE EMULSION 50 MG: 10 INJECTION, EMULSION INTRAVENOUS at 08:39

## 2024-03-19 RX ADMIN — SODIUM CHLORIDE, POTASSIUM CHLORIDE, SODIUM LACTATE AND CALCIUM CHLORIDE 9 ML/HR: 600; 310; 30; 20 INJECTION, SOLUTION INTRAVENOUS at 08:03

## 2024-03-19 RX ADMIN — PROPOFOL INJECTABLE EMULSION 150 MCG/KG/MIN: 10 INJECTION, EMULSION INTRAVENOUS at 08:38

## 2024-03-19 RX ADMIN — FENTANYL CITRATE 25 MCG: 50 INJECTION, SOLUTION INTRAMUSCULAR; INTRAVENOUS at 08:37

## 2024-03-19 RX ADMIN — ONDANSETRON 4 MG: 2 INJECTION INTRAMUSCULAR; INTRAVENOUS at 08:03

## 2024-03-19 NOTE — INTERVAL H&P NOTE
H&P reviewed. The patient was examined and there are no changes to the H&P.  /81 (BP Location: Right arm, Patient Position: Lying)   Pulse 86   Temp 98.3 °F (36.8 °C) (Oral)   Resp 18   Wt 116 kg (256 lb)   SpO2 92%   BMI 46.82 kg/m²   Procedure reviewed and all questions answered.   Sylvia Bhardwaj MD

## 2024-03-19 NOTE — ANESTHESIA POSTPROCEDURE EVALUATION
Patient: Scarlett Ray    Procedure Summary       Date: 03/19/24 Room / Location: formerly [facility] OR 1 /  [facility] OR    Anesthesia Start: 0832 Anesthesia Stop: 0916    Procedure: DILATATION AND CURETTAGE HYSTEROSCOPY with MYOSURE; POLYPECTOMY (Vagina) Diagnosis:       PMB (postmenopausal bleeding)      BMI 45.0-49.9, adult      (PMB (postmenopausal bleeding) [N95.0])      (BMI 45.0-49.9, adult [Z68.42])    Surgeons: Sylvia Bhardwaj MD Provider: Baltazar Dan CRNA    Anesthesia Type: MAC ASA Status: 3            Anesthesia Type: MAC    Vitals  Vitals Value Taken Time   /79 03/19/24 1000   Temp 97.8 °F (36.6 °C) 03/19/24 0914   Pulse 66 03/19/24 1004   Resp 20 03/19/24 0925   SpO2 91 % 03/19/24 1005   Vitals shown include unfiled device data.        Post Anesthesia Care and Evaluation    Patient location during evaluation: PHASE II  Patient participation: complete - patient participated  Level of consciousness: awake and alert  Pain score: 4  Pain management: satisfactory to patient    Airway patency: patent  Anesthetic complications: No anesthetic complications  PONV Status: none  Cardiovascular status: acceptable  Respiratory status: acceptable  Hydration status: acceptable

## 2024-03-19 NOTE — OP NOTE
Date of Service:  3/19/2024    Time of Service:  09:10 EDT     Surgical Staff: Surgeons and Role:     * Sylvia Bhardwaj MD - Primary   Additional Staff: none   Pre-operative diagnosis(es): Pre-Op Diagnosis Codes:     * PMB (postmenopausal bleeding) [N95.0]     * BMI 45.0-49.9, adult [Z68.42]     Post-operative diagnosis(es): Post-Op Diagnosis Codes:     * PMB (postmenopausal bleeding) [N95.0]     * BMI 45.0-49.9, adult [Z68.42]   Procedure(s): Procedure(s):  DILATATION AND CURETTAGE HYSTEROSCOPY with MYOSURE; POLYPECTOMY     Antibiotics: cefazolin (Ancef) ordered on call to OR     Anesthesia: Type: Monitored Anesthesia Care  ASA:  III          Operative findings: Endometrial polyp     Specimens removed: ID Type Source Tests Collected by Time   A :  Polyp Endometrium TISSUE PATHOLOGY EXAM Sylvia Bhardwaj MD 3/19/2024 0859   B :  Tissue Endometrial Curettings TISSUE PATHOLOGY EXAM Sylvia Bhardwaj MD 3/19/2024 0900      Fluid Intake: 500mL   Output: Documented Output  Est. Blood Loss 5mL  Urine Output 50mL  Other Output 205 mL HSFD      I/O this shift:  In: 600 [I.V.:500; IV Piggyback:100]  Out: 155 [Urine:150; Blood:5]     Blood products used: No   Drains: * No LDAs found *   Implant Information: Nothing was implanted during the procedure   Complications: None apparent   Intraoperative consult(s): none   Condition: stable   Disposition: to PACU and then admit to home             After all questions were answered the patient was taken to the OR and placed under MAC anesthesia. She was carefully and attentively placed in dorsal lithotomy position. She was prepped and draped in a sterile fashion. A speculum was placed into the vagina and a single tooth tenaculum was placed at the anterior lip of the cervix. The cervical os was dilated. A hysteroscope as then inserted and the endometrial cavity was distended with normal saline. A survey of the endometrial cavity revealed an endometrial  polyp on the posterior wall. The MYOSYRE REACH device was then used to remove this polyp down to it's base and to randomly sample the cavity. They hysteroscopic portion of the exam was completed and the fluid deficit was 205 cc. .  A sharp curette was then placed into the endometrial cavity and a sampling of the cavity was made. The sample was sent for permanent section. The procedure was deemed completed and all instruments were removed from the vagina. All counts are correct for the procedure. Excellent hemostasis was noted and pt was in stable condition to PACU. She will be discharged home once she meets recovery room criteria. She should follow up in the office in 2 weeks for a postop check . Postop instructions and restrictions were reviewed with the patient and her family in detail and all questions were answered.             Sylvia Bhardwaj MD  03/19/24  09:10 EDT

## 2024-03-19 NOTE — H&P
Patient Care Team:  Raza Chapman MD as PCP - General (Family Medicine)  Jeffry Espino MD as PCP - Family Medicine    Chief complaint  VB       HPI:     Scarlett Ray is a 68 y.o. female new patient who presents for HS D&Cwiht MYOSURE for   VB. She underwent menopause at age 45 and is not on any HRT. She has had spotting 3 times. She had a HS D&C in  for  VB and had a polyp. Her US showed a 5.3 cm AV uterus with an EL= 0. 55 cm. There is fluid in the endometrium that measures 0.9 x 0.2 cm. Her ovaries were normal but visualization was limited. There is no comparable data. Her exam is extremely limited due to habitus. We discussed HS D&C with MYOSURE and she is agreeable.        PMH:   Past Medical History:   Diagnosis Date    Asthma     Coronary artery disease 2022    h/o CABG x3    Elevated cholesterol     Elevated liver enzymes     H/O seasonal allergies     Hiatal hernia     HLD (hyperlipidemia)     Hypertension     Obesity     NONI (obstructive sleep apnea)     compliant w/ CPAP    Shoulder dislocation 01/10/2024    left    Slow to wake up after anesthesia     Type 2 diabetes mellitus     on metformin at home         PSH:   Past Surgical History:   Procedure Laterality Date    BREAST BIOPSY Left     CARDIAC CATHETERIZATION N/A 2022    Procedure: Left Heart Cath;  Surgeon: Ada Martinez MD;  Location:  [facility];  Service: Cardiology;  Laterality: N/A;    CARDIAC CATHETERIZATION N/A 2022    Procedure: Left ventriculography;  Surgeon: Ada Martinez MD;  Location:  [facility];  Service: Cardiology;  Laterality: N/A;    CARDIAC CATHETERIZATION N/A 2022    Procedure: Coronary angiography;  Surgeon: Ada Martinez MD;  Location:  [facility];  Service: Cardiology;  Laterality: N/A;     SECTION      CORONARY ARTERY BYPASS GRAFT N/A 2022    Procedure: MIDLINE STERNOTOMY, CORONARY ARTERY BYPASS  GRAFTING X3 , UTILIZING THE RIGHT SAPHENOUS VEIN AND LEFT SAM, RAINE, PRP;  Surgeon: Rodolfo Castellon MD;  Location: [facility];  Service: Cardiothoracic;  Laterality: N/A;    CORONARY ARTERY BYPASS GRAFT  2022    Procedure: ;  Surgeon: Rodolfo Castellon MD;  Location: [facility];  Service: Cardiothoracic;;    DILATATION AND CURETTAGE      X2    UMBILICAL HERNIA REPAIR         SoHx:   Social History     Socioeconomic History    Marital status:    Tobacco Use    Smoking status: Never     Passive exposure: Never    Smokeless tobacco: Never    Tobacco comments:     no caffiene   Vaping Use    Vaping status: Never Used   Substance and Sexual Activity    Alcohol use: No    Drug use: No    Sexual activity: Not Currently     Birth control/protection: Post-menopausal       FHx:   Family History   Problem Relation Age of Onset    Breast cancer Paternal Aunt     Ovarian cancer Neg Hx     Uterine cancer Neg Hx     Colon cancer Neg Hx     Deep vein thrombosis Neg Hx     Pulmonary embolism Neg Hx     Malig Hyperthermia Neg Hx      OB History            1    Para   1    Term   1            AB        Living   1           SAB        IAB        Ectopic        Molar        Multiple        Live Births               Obstetric Comments   1 C/S                   Menarche:10  Menopause:45  HRT:none  Current contraception: post menopausal status  History of abnormal Pap smear: no  History of abnormal mammogram: yes - s/p Breast bx, B9   Family history of uterine, colon or ovarian cancer: no  Family history of breast cancer: yes - p aunt > 50   STD's: none  Last pap smear: 2021- nl pap/HPV  Gardasil: missed  MIHIR: none      Allergies: Ace inhibitors, Latex, and Meloxicam    Medications:   No current facility-administered medications on file prior to encounter.     Current Outpatient Medications on File Prior to Encounter   Medication Sig Dispense Refill    albuterol sulfate  (90 Base) MCG/ACT  inhaler Inhale 2 puffs Every 6 (Six) Hours As Needed for Shortness of Air or Wheezing.  0    aspirin 81 MG EC tablet Take 1 tablet by mouth Daily. 30 tablet 11    atorvastatin (LIPITOR) 40 MG tablet Take 1 tablet by mouth in the evening (Patient taking differently: Take 1 tablet by mouth Every Night.) 90 tablet 0    Biotin 1000 MCG tablet Take 1,000 mcg by mouth Daily.      calcium carbonate (OS-NUSRAT) 600 MG tablet Take 1 tablet by mouth 2 (Two) Times a Day With Meals.      cholecalciferol (VITAMIN D3) 25 MCG (1000 UT) tablet Take 2 tablets by mouth Daily.      diphenhydrAMINE (BENADRYL) 25 mg capsule Take 1 capsule by mouth Every 6 (Six) Hours As Needed for Itching.      HYDROcodone-acetaminophen (NORCO) 5-325 MG per tablet Take 1 tablet by mouth Every 6 (Six) Hours As Needed for Severe Pain. 30 tablet 0    meloxicam (MOBIC) 7.5 MG tablet Take 1 tablet by mouth Daily. 30 tablet 5    metFORMIN ER (GLUCOPHAGE-XR) 500 MG 24 hr tablet Take 1 tablet by mouth 2 (Two) Times a Day for 90 days. 60 tablet 2    metoprolol tartrate (LOPRESSOR) 50 MG tablet Take 1 tablet by mouth 2 (Two) Times a Day for 90 days. 60 tablet 2       There were no vitals taken for this visit.    Review of Systems   Constitutional:  Positive for activity change. Negative for appetite change, fatigue, fever and unexpected weight change.   Eyes:  Negative for photophobia and visual disturbance.   Respiratory:  Negative for cough and shortness of breath.    Cardiovascular:  Negative for chest pain and palpitations.   Gastrointestinal:  Negative for abdominal distention, abdominal pain, constipation, diarrhea and nausea.   Endocrine: Negative for cold intolerance and heat intolerance.   Genitourinary:  Positive for vaginal bleeding. Negative for dyspareunia, dysuria, menstrual problem, pelvic pain and vaginal discharge.   Musculoskeletal:  Positive for arthralgias (shoulder pain) and joint swelling. Negative for back pain.   Skin:  Negative for color  change and rash.   Neurological:  Negative for headaches.   Hematological:  Negative for adenopathy. Does not bruise/bleed easily.   Psychiatric/Behavioral:  Negative for dysphoric mood. The patient is not nervous/anxious.        Physical Exam  Vitals and nursing note reviewed. Exam conducted with a chaperone present.   Constitutional:       Appearance: Normal appearance. She is well-developed. She is obese.   HENT:      Head: Normocephalic and atraumatic.   Eyes:      General: No scleral icterus.     Conjunctiva/sclera: Conjunctivae normal.   Neck:      Thyroid: No thyromegaly.   Cardiovascular:      Rate and Rhythm: Normal rate and regular rhythm.   Pulmonary:      Effort: Pulmonary effort is normal.      Breath sounds: Normal breath sounds.   Abdominal:      General: Bowel sounds are normal. There is no distension.      Palpations: Abdomen is soft. There is no mass.      Tenderness: There is no abdominal tenderness. There is no guarding or rebound.      Hernia: No hernia is present.   Genitourinary:     Exam position: Supine.      Labia:         Right: No rash, tenderness or lesion.         Left: No rash, tenderness or lesion.       Urethra: No prolapse, urethral pain, urethral swelling or urethral lesion.      Vagina: No signs of injury and foreign body. Prolapsed vaginal walls present. No vaginal discharge, erythema, tenderness or bleeding.      Cervix: No cervical motion tenderness, discharge or friability.      Uterus: With uterine prolapse. Not deviated, not enlarged, not fixed and not tender.       Adnexa:         Right: No mass, tenderness or fullness.          Left: No mass, tenderness or fullness.        Rectum: Normal.      Comments: Exam limited by habitus and mobility.   Musculoskeletal:      Cervical back: Neck supple.   Skin:     General: Skin is warm and dry.   Neurological:      Mental Status: She is alert and oriented to person, place, and time.   Psychiatric:         Behavior: Behavior normal.          Thought Content: Thought content normal.         Judgment: Judgment normal.         Debilities/Disabilities Identified: None    Labs:     Lab Results (last 7 days)       ** No results found for the last 168 hours. **            Assessment/Plan:     VB- EL 0.5 cm, fluid in the uterine canal. Plan HS D&C with MYOSURE. Parts of this document have been copied or forwarded from her previous visits and have been reviewed, updated and edited as indicated.          I discussed the patients findings and my recommendations with patient.     Sylvia Bhardwaj MD  03/19/24  06:31 EDT

## 2024-03-19 NOTE — ANESTHESIA PREPROCEDURE EVALUATION
Anesthesia Evaluation     Patient summary reviewed and Nursing notes reviewed   no history of anesthetic complications:   NPO Solid Status: > 8 hours  NPO Liquid Status: > 2 hours           Airway   Mallampati: III  TM distance: <3 FB  Neck ROM: full  Small opening and Anterior  Dental - normal exam     Comment: Partial out    Pulmonary - normal exam    breath sounds clear to auscultation  (+) asthma (inhaler used this am),sleep apnea on CPAP  Cardiovascular - normal exam  Exercise tolerance: good (4-7 METS)    ECG reviewed  Patient on routine beta blocker and Beta blocker given within 24 hours of surgery  Rhythm: regular  Rate: normal    (+) hypertension, CAD, CABG >6 Months, angina, hyperlipidemia    ROS comment: Date/Time: 3/4/2024 10:55 AM  Comparison: compared with previous ECG from 5/11/2022  Similar to previous ECG  Rhythm: sinus rhythm  Rate: normal  Conduction: conduction normal  ST Segments: ST segments normal  T Waves: T waves normal  QRS axis: normal  Other: no other findings   Clinical impression: normal ECG     Multivessel coronary artery status post CABG x3: LIMA to LAD, SVG to OM1, SVG to OM 2 on 4/29/2022 after presenting with unstable angina.   Echocardiogram Comments 4/29/22       66 year old female with severe multivessel CAD presents for CABG.  - Normal LV size, function (LVF >55%, no significant RWMAs)  - Normal RV size, function  - No significant valvular abnormalities  - No intracardiac shunt  - Trace pericardial effusion  - No aortic dissection   S/p CABG x3:  - Normal BiV  - No significant valvular abnormalities  - No pericardial/pleural effusion  - No aortic dissection      She is going to have D&C this month and from cardiac standpoint she can go ahead and get procedure.       Neuro/Psych- negative ROS  GI/Hepatic/Renal/Endo    (+) obesity, morbid obesity, hiatal hernia, diabetes mellitus () type 2 well controlled using insulin    Musculoskeletal     Abdominal   (+) obese    Substance History - negative use  (-) alcohol use, drug use (recent history of dislocation left shoulder)     OB/GYN negative ob/gyn ROS         Other   arthritis,                   Anesthesia Plan    ASA 3     MAC     (MAC with progression to GA discussed with patient and family and accepted)  intravenous induction   Postoperative Plan: Expected vent after surgery  Anesthetic plan, risks, benefits, and alternatives have been provided, discussed and informed consent has been obtained with: patient.    Use of blood products discussed with patient  Consented to blood products.        CODE STATUS:

## 2024-03-20 LAB
LAB AP CASE REPORT: NORMAL
PATH REPORT.FINAL DX SPEC: NORMAL
PATH REPORT.GROSS SPEC: NORMAL

## 2024-04-01 ENCOUNTER — OFFICE VISIT (OUTPATIENT)
Dept: OBSTETRICS AND GYNECOLOGY | Facility: CLINIC | Age: 69
End: 2024-04-01
Payer: MEDICARE

## 2024-04-01 VITALS
DIASTOLIC BLOOD PRESSURE: 82 MMHG | BODY MASS INDEX: 46.74 KG/M2 | SYSTOLIC BLOOD PRESSURE: 168 MMHG | HEIGHT: 62 IN | WEIGHT: 254 LBS

## 2024-04-01 DIAGNOSIS — Z09 POSTOPERATIVE FOLLOW-UP: ICD-10-CM

## 2024-04-01 DIAGNOSIS — Z13.89 SCREENING FOR GENITOURINARY CONDITION: Primary | ICD-10-CM

## 2024-04-01 DIAGNOSIS — N95.0 PMB (POSTMENOPAUSAL BLEEDING): ICD-10-CM

## 2024-04-01 LAB
BILIRUB BLD-MCNC: NEGATIVE MG/DL
CLARITY, POC: CLEAR
COLOR UR: YELLOW
GLUCOSE UR STRIP-MCNC: NEGATIVE MG/DL
KETONES UR QL: NEGATIVE
LEUKOCYTE EST, POC: NEGATIVE
NITRITE UR-MCNC: NEGATIVE MG/ML
PH UR: 5 [PH] (ref 5–8)
PROT UR STRIP-MCNC: NEGATIVE MG/DL
RBC # UR STRIP: NEGATIVE /UL
SP GR UR: 1 (ref 1–1.03)
UROBILINOGEN UR QL: NORMAL

## 2024-04-01 PROCEDURE — 1160F RVW MEDS BY RX/DR IN RCRD: CPT | Performed by: OBSTETRICS & GYNECOLOGY

## 2024-04-01 PROCEDURE — 99213 OFFICE O/P EST LOW 20 MIN: CPT | Performed by: OBSTETRICS & GYNECOLOGY

## 2024-04-01 PROCEDURE — 3079F DIAST BP 80-89 MM HG: CPT | Performed by: OBSTETRICS & GYNECOLOGY

## 2024-04-01 PROCEDURE — 81002 URINALYSIS NONAUTO W/O SCOPE: CPT | Performed by: OBSTETRICS & GYNECOLOGY

## 2024-04-01 PROCEDURE — 3077F SYST BP >= 140 MM HG: CPT | Performed by: OBSTETRICS & GYNECOLOGY

## 2024-04-01 PROCEDURE — 1159F MED LIST DOCD IN RCRD: CPT | Performed by: OBSTETRICS & GYNECOLOGY

## 2024-04-01 NOTE — LETTER
"April 1, 2024     Raza Chapman MD  [address]  [address]  [place] [address]    Patient: Scareltt Ray   YOB: 1955   Date of Visit: 4/1/2024       Dear Raza Chapman MD,    Thank you for referring Scarlett Ray to me for evaluation. Below is a copy of my consult note.    If you have questions, please do not hesitate to call me. I look forward to following Scarlett along with you.         Sincerely,        Sylvia Bhardwaj MD        CC: No Recipients    Surgical follow up visit     Scarlett Ray is a 68 y.o. female who presents to the clinic 2 weeks status post D&C, Hysteroscopy, and polypectomy for   VB  Eating a regular diet without difficulty. Bowel movements are normal. The patient is not having any pain..  Vaginal bleeding is none. Her path showed a benign polyp. She has been advised to anaid back for any VB.     The following portions of the patient's history were reviewed and updated as appropriate: allergies, current medications, past family history, past medical history, past social history, past surgical history, and problem list.    Review of Systems  Review of Systems   Constitutional:  Positive for activity change.   Genitourinary:  Negative for pelvic pain, vaginal bleeding, vaginal discharge and vaginal pain.        Objective    /82   Ht 157.5 cm (62\")   Wt 115 kg (254 lb)   BMI 46.46 kg/m²     Physical Exam  Vitals and nursing note reviewed.   Constitutional:       Appearance: She is well-developed.   HENT:      Head: Normocephalic and atraumatic.   Eyes:      General: No scleral icterus.     Conjunctiva/sclera: Conjunctivae normal.   Neck:      Thyroid: No thyromegaly.   Abdominal:      General: There is no distension.      Palpations: Abdomen is soft. There is no mass.      Tenderness: There is no abdominal tenderness. There is no guarding or rebound.      Hernia: No hernia is present.   Skin:     General: Skin is warm and dry. "   Neurological:      Mental Status: She is alert and oriented to person, place, and time.   Psychiatric:         Mood and Affect: Mood normal.         Behavior: Behavior normal.         Thought Content: Thought content normal.         Judgment: Judgment normal.         Assessment     1) Post op D&C, Hysteroscopy, and polypectomy- Doing well postoperatively.  2) Operative findings again reviewed. Pathology report discussed. Benign polyp on path.   Intraoperative photos were reviewed.      Plan    1. Continue any current medications.  2. Wound care discussed.  3. Activity restrictions: none  4. Anticipated return to work: now.  5. Follow up: 2/2026 annual and/or prn.    Sylvia Bhardwaj MD     04/01/2024     18:53 EDT

## 2024-04-01 NOTE — PROGRESS NOTES
"Surgical follow up visit     Scarlett Ray is a 68 y.o. female who presents to the clinic 2 weeks status post D&C, Hysteroscopy, and polypectomy for   VB  Eating a regular diet without difficulty. Bowel movements are normal. The patient is not having any pain..  Vaginal bleeding is none. Her path showed a benign polyp. She has been advised to anaid back for any VB.     The following portions of the patient's history were reviewed and updated as appropriate: allergies, current medications, past family history, past medical history, past social history, past surgical history, and problem list.    Review of Systems  Review of Systems   Constitutional:  Positive for activity change.   Genitourinary:  Negative for pelvic pain, vaginal bleeding, vaginal discharge and vaginal pain.        Objective    /82   Ht 157.5 cm (62\")   Wt 115 kg (254 lb)   BMI 46.46 kg/m²     Physical Exam  Vitals and nursing note reviewed.   Constitutional:       Appearance: She is well-developed.   HENT:      Head: Normocephalic and atraumatic.   Eyes:      General: No scleral icterus.     Conjunctiva/sclera: Conjunctivae normal.   Neck:      Thyroid: No thyromegaly.   Abdominal:      General: There is no distension.      Palpations: Abdomen is soft. There is no mass.      Tenderness: There is no abdominal tenderness. There is no guarding or rebound.      Hernia: No hernia is present.   Skin:     General: Skin is warm and dry.   Neurological:      Mental Status: She is alert and oriented to person, place, and time.   Psychiatric:         Mood and Affect: Mood normal.         Behavior: Behavior normal.         Thought Content: Thought content normal.         Judgment: Judgment normal.         Assessment     1) Post op D&C, Hysteroscopy, and polypectomy- Doing well postoperatively.  2) Operative findings again reviewed. Pathology report discussed. Benign polyp on path.   Intraoperative photos were reviewed.      Plan    1. Continue any " current medications.  2. Wound care discussed.  3. Activity restrictions: none  4. Anticipated return to work: now.  5. Follow up: 2/2026 annual and/or prn.    Sylvia Bhardwaj MD     04/01/2024     18:53 EDT

## 2024-04-09 ENCOUNTER — OFFICE VISIT (OUTPATIENT)
Dept: ORTHOPEDIC SURGERY | Facility: CLINIC | Age: 69
End: 2024-04-09
Payer: MEDICARE

## 2024-04-09 VITALS — WEIGHT: 254 LBS | BODY MASS INDEX: 46.74 KG/M2 | HEIGHT: 62 IN

## 2024-04-09 DIAGNOSIS — M75.02 ADHESIVE CAPSULITIS OF LEFT SHOULDER: ICD-10-CM

## 2024-04-09 DIAGNOSIS — S43.015D ANTERIOR DISLOCATION OF LEFT SHOULDER, SUBSEQUENT ENCOUNTER: Primary | ICD-10-CM

## 2024-04-09 PROCEDURE — 1159F MED LIST DOCD IN RCRD: CPT | Performed by: ORTHOPAEDIC SURGERY

## 2024-04-09 PROCEDURE — 99213 OFFICE O/P EST LOW 20 MIN: CPT | Performed by: ORTHOPAEDIC SURGERY

## 2024-04-09 PROCEDURE — 1160F RVW MEDS BY RX/DR IN RCRD: CPT | Performed by: ORTHOPAEDIC SURGERY

## 2024-04-09 NOTE — PROGRESS NOTES
Subjective: Status post left shoulder dislocation     Patient ID: Scarlett Ray is a 68 y.o. female.    Chief Complaint:    History of Present Illness.  Patient is now 3 months out from her dislocation she is doing well.  Has been discharged from physical therapy.  She is experiencing minimal pain and she states she is able perform all activities of daily living as far as feeding dressing care for herself.       Social History     Occupational History    Not on file   Tobacco Use    Smoking status: Never     Passive exposure: Never    Smokeless tobacco: Never    Tobacco comments:     no caffiene   Vaping Use    Vaping status: Never Used   Substance and Sexual Activity    Alcohol use: No    Drug use: No    Sexual activity: Not Currently     Birth control/protection: Post-menopausal      Review of Systems      Past Medical History:   Diagnosis Date    Asthma     Coronary artery disease 2022    h/o CABG x3    Elevated cholesterol     Elevated liver enzymes     H/O seasonal allergies     Hiatal hernia     HLD (hyperlipidemia)     Hypertension     Obesity     NONI (obstructive sleep apnea)     compliant w/ CPAP    Shoulder dislocation 01/10/2024    left    Slow to wake up after anesthesia     Type 2 diabetes mellitus     on metformin at home     Past Surgical History:   Procedure Laterality Date    BREAST BIOPSY Left     CARDIAC CATHETERIZATION N/A 2022    Procedure: Left Heart Cath;  Surgeon: Ada Martinez MD;  Location: [facility] CATH INVASIVE LOCATION;  Service: Cardiology;  Laterality: N/A;    CARDIAC CATHETERIZATION N/A 2022    Procedure: Left ventriculography;  Surgeon: Ada Martinez MD;  Location:  [facility];  Service: Cardiology;  Laterality: N/A;    CARDIAC CATHETERIZATION N/A 2022    Procedure: Coronary angiography;  Surgeon: Ada Martinez MD;  Location:  [facility];  Service: Cardiology;  Laterality: N/A;     SECTION      CORONARY ARTERY BYPASS  GRAFT N/A 4/29/2022    Procedure: MIDLINE STERNOTOMY, CORONARY ARTERY BYPASS GRAFTING X3 , UTILIZING THE RIGHT SAPHENOUS VEIN AND LEFT SAM, RAINE, PRP;  Surgeon: Rodolfo Castellon MD;  Location: [facility];  Service: Cardiothoracic;  Laterality: N/A;    CORONARY ARTERY BYPASS GRAFT  4/29/2022    Procedure: ;  Surgeon: Rodolfo Castellon MD;  Location: [facility];  Service: Cardiothoracic;;    D & C HYSTEROSCOPY MYOSURE N/A 3/19/2024    Procedure: DILATATION AND CURETTAGE HYSTEROSCOPY with MYOSURE; POLYPECTOMY;  Surgeon: Sylvia Bhardwaj MD;  Location: [facility] OR;  Service: Obstetrics/Gynecology;  Laterality: N/A;  def - 205    DILATATION AND CURETTAGE      X2    UMBILICAL HERNIA REPAIR       Family History   Problem Relation Age of Onset    Breast cancer Paternal Aunt     Ovarian cancer Neg Hx     Uterine cancer Neg Hx     Colon cancer Neg Hx     Deep vein thrombosis Neg Hx     Pulmonary embolism Neg Hx     Malig Hyperthermia Neg Hx          Objective:  There were no vitals filed for this visit.      04/09/24  1005   Weight: 115 kg (254 lb)     Body mass index is 46.46 kg/m².        Ortho Exam   she is alert and oriented x 3.  She has no motor or sensory deficit in the left upper extremity.  She can abduct and extend about 120 degrees and 90 degrees against resistance rotator cuff function is 4 and half out of 5.  Minimal to no pain on crossarm test.  Negative Coy test.  Negative belly press test.  External rotation is probably 35 to 40 degrees internal rotation is to L4-5.  She has good capillary refill.  She is taking no medication for pain or discomfort.    Assessment:        1. Anterior dislocation of left shoulder, subsequent encounter    2. Adhesive capsulitis of left shoulder           Plan: Patient again is able to perform all activities of daily living as far as feeding dressing and caring for self.  I did advise her to continue the stretching exercises in the shower.  Return to see  me on an as-needed basis.  Answered all questions            Work Status:    MICKY query complete.    Orders:  No orders of the defined types were placed in this encounter.      Medications:  No orders of the defined types were placed in this encounter.      Followup:  Return if symptoms worsen or fail to improve.          Dictated utilizing Dragon dictation

## 2024-06-24 ENCOUNTER — TELEPHONE (OUTPATIENT)
Dept: CARDIOLOGY | Facility: CLINIC | Age: 69
End: 2024-06-24
Payer: MEDICARE

## 2024-06-24 DIAGNOSIS — I10 ESSENTIAL HYPERTENSION: ICD-10-CM

## 2024-06-24 DIAGNOSIS — I25.810 CORONARY ARTERY DISEASE INVOLVING CORONARY BYPASS GRAFT OF NATIVE HEART WITHOUT ANGINA PECTORIS: Primary | ICD-10-CM

## 2024-06-24 RX ORDER — AMLODIPINE BESYLATE 5 MG/1
5 TABLET ORAL DAILY
COMMUNITY
End: 2024-06-26

## 2024-06-24 RX ORDER — CHLORTHALIDONE 25 MG/1
25 TABLET ORAL DAILY
Qty: 30 TABLET | Refills: 3 | Status: SHIPPED | OUTPATIENT
Start: 2024-06-24

## 2024-06-24 NOTE — TELEPHONE ENCOUNTER
Notified patient of recommendations. She is agreeable to proceed with the change. She would like the medication sent to her [facility] pharmacy on file.     Alta Haywood RN  Triage [facility]

## 2024-06-24 NOTE — TELEPHONE ENCOUNTER
I sent in chlorthalidone 25mg daily. She needs repeat labs in a week which I ordered. We will call with results    She should continue to monitor her BP and let us know if she has any concerns

## 2024-06-24 NOTE — TELEPHONE ENCOUNTER
It sounds like this is related to starting the amlodipine so I would stop the amlodipine.    We can start her on chlorthalidone in it's place.  She will need labs in 1-2 weeks on this medication.  If she is agreeable I will send it in for her and order labs.

## 2024-06-24 NOTE — TELEPHONE ENCOUNTER
Patient calls because 1 month ago her PCP started her on amlodipine 5mg daily for her BP. She said within the last week her legs and ankles have been swollen. She denies any SOA or weight gain. BP 120s/70-80s HR 60s. I explained to her that the swelling may be from the amlodipine. Below are her meds:    Aspirin 81mg daily  Metoprolol 50mg BID  Amlodipine 5mg daily    Alta Haywood RN  Triage [facility]

## 2024-06-26 ENCOUNTER — TELEPHONE (OUTPATIENT)
Dept: CARDIOLOGY | Facility: CLINIC | Age: 69
End: 2024-06-26
Payer: MEDICARE

## 2024-06-26 NOTE — TELEPHONE ENCOUNTER
Patient called today wanting to speak with someone about the diuretic that was prescribed for her on 6/24/2024. Patient is afraid to take this medication because she thinks that it might be too strong. She is still experiencing the edema. Patient would like a call back to discuss, Phone number [phone].

## 2024-07-05 ENCOUNTER — LAB (OUTPATIENT)
Dept: LAB | Facility: HOSPITAL | Age: 69
End: 2024-07-05
Payer: MEDICARE

## 2024-07-05 DIAGNOSIS — I10 ESSENTIAL HYPERTENSION: ICD-10-CM

## 2024-07-05 LAB
ANION GAP SERPL CALCULATED.3IONS-SCNC: 13 MMOL/L (ref 5–15)
BUN SERPL-MCNC: 16 MG/DL (ref 8–23)
BUN/CREAT SERPL: 21.3 (ref 7–25)
CALCIUM SPEC-SCNC: 10 MG/DL (ref 8.6–10.5)
CHLORIDE SERPL-SCNC: 101 MMOL/L (ref 98–107)
CO2 SERPL-SCNC: 25 MMOL/L (ref 22–29)
CREAT SERPL-MCNC: 0.75 MG/DL (ref 0.57–1)
EGFRCR SERPLBLD CKD-EPI 2021: 86.3 ML/MIN/1.73
GLUCOSE SERPL-MCNC: 123 MG/DL (ref 65–99)
POTASSIUM SERPL-SCNC: 4 MMOL/L (ref 3.5–5.2)
SODIUM SERPL-SCNC: 139 MMOL/L (ref 136–145)

## 2024-07-05 PROCEDURE — 36415 COLL VENOUS BLD VENIPUNCTURE: CPT

## 2024-07-05 PROCEDURE — 80048 BASIC METABOLIC PNL TOTAL CA: CPT

## 2024-07-09 NOTE — PROGRESS NOTES
Reviewed results with Scarlett Ray and patient verbalized understanding of results.    Thank you,  Keira LAW RN  Triage Nurse JENNAG    16:00 EDT

## 2024-08-30 RX ORDER — CHLORTHALIDONE 25 MG/1
25 TABLET ORAL DAILY
Qty: 90 TABLET | Refills: 1 | Status: SHIPPED | OUTPATIENT
Start: 2024-08-30

## 2024-09-04 ENCOUNTER — HOSPITAL ENCOUNTER (OUTPATIENT)
Dept: MAMMOGRAPHY | Facility: HOSPITAL | Age: 69
Discharge: HOME | End: 2024-09-04
Admitting: OBSTETRICS & GYNECOLOGY
Payer: MEDICARE

## 2024-09-04 DIAGNOSIS — Z12.31 ENCOUNTER FOR SCREENING MAMMOGRAM FOR MALIGNANT NEOPLASM OF BREAST: ICD-10-CM

## 2024-09-04 PROCEDURE — 77063 BREAST TOMOSYNTHESIS BI: CPT

## 2024-09-04 PROCEDURE — 77067 SCR MAMMO BI INCL CAD: CPT

## 2024-09-16 ENCOUNTER — OFFICE VISIT (OUTPATIENT)
Dept: CARDIOLOGY | Facility: CLINIC | Age: 69
End: 2024-09-16
Payer: MEDICARE

## 2024-09-16 VITALS
OXYGEN SATURATION: 95 % | HEIGHT: 62 IN | SYSTOLIC BLOOD PRESSURE: 140 MMHG | HEART RATE: 70 BPM | DIASTOLIC BLOOD PRESSURE: 80 MMHG | RESPIRATION RATE: 16 BRPM | WEIGHT: 242.2 LBS | BODY MASS INDEX: 44.57 KG/M2

## 2024-09-16 DIAGNOSIS — I10 ESSENTIAL HYPERTENSION: ICD-10-CM

## 2024-09-16 DIAGNOSIS — I25.810 CORONARY ARTERY DISEASE INVOLVING CORONARY BYPASS GRAFT OF NATIVE HEART WITHOUT ANGINA PECTORIS: Primary | ICD-10-CM

## 2024-09-16 DIAGNOSIS — E78.00 HYPERCHOLESTEROLEMIA: ICD-10-CM

## 2024-09-16 PROCEDURE — 3077F SYST BP >= 140 MM HG: CPT | Performed by: INTERNAL MEDICINE

## 2024-09-16 PROCEDURE — 3079F DIAST BP 80-89 MM HG: CPT | Performed by: INTERNAL MEDICINE

## 2024-09-16 PROCEDURE — 99214 OFFICE O/P EST MOD 30 MIN: CPT | Performed by: INTERNAL MEDICINE

## 2024-12-23 ENCOUNTER — HOSPITAL ENCOUNTER (EMERGENCY)
Facility: HOSPITAL | Age: 69
Discharge: HOME | End: 2024-12-24
Payer: MEDICARE

## 2024-12-23 DIAGNOSIS — R05.2 SUBACUTE COUGH: Primary | ICD-10-CM

## 2024-12-23 PROCEDURE — 36415 COLL VENOUS BLD VENIPUNCTURE: CPT

## 2024-12-23 PROCEDURE — 99285 EMERGENCY DEPT VISIT HI MDM: CPT

## 2024-12-23 PROCEDURE — 96374 THER/PROPH/DIAG INJ IV PUSH: CPT

## 2024-12-24 ENCOUNTER — APPOINTMENT (OUTPATIENT)
Dept: CT IMAGING | Facility: HOSPITAL | Age: 69
End: 2024-12-24
Payer: MEDICARE

## 2024-12-24 ENCOUNTER — APPOINTMENT (OUTPATIENT)
Dept: GENERAL RADIOLOGY | Facility: HOSPITAL | Age: 69
End: 2024-12-24
Payer: MEDICARE

## 2024-12-24 VITALS
WEIGHT: 242 LBS | HEIGHT: 62 IN | OXYGEN SATURATION: 91 % | BODY MASS INDEX: 44.53 KG/M2 | SYSTOLIC BLOOD PRESSURE: 141 MMHG | TEMPERATURE: 98.2 F | DIASTOLIC BLOOD PRESSURE: 65 MMHG | RESPIRATION RATE: 18 BRPM | HEART RATE: 101 BPM

## 2024-12-24 LAB
ALBUMIN SERPL-MCNC: 3.9 G/DL (ref 3.5–5.2)
ALBUMIN/GLOB SERPL: 1.3 G/DL
ALP SERPL-CCNC: 211 U/L (ref 39–117)
ALT SERPL W P-5'-P-CCNC: 11 U/L (ref 1–33)
ANION GAP SERPL CALCULATED.3IONS-SCNC: 13.6 MMOL/L (ref 5–15)
AST SERPL-CCNC: 14 U/L (ref 1–32)
BASOPHILS # BLD AUTO: 0.02 10*3/MM3 (ref 0–0.2)
BASOPHILS NFR BLD AUTO: 0.1 % (ref 0–1.5)
BILIRUB SERPL-MCNC: 0.3 MG/DL (ref 0–1.2)
BUN SERPL-MCNC: 19 MG/DL (ref 8–23)
BUN/CREAT SERPL: 21.8 (ref 7–25)
CALCIUM SPEC-SCNC: 9.8 MG/DL (ref 8.6–10.5)
CHLORIDE SERPL-SCNC: 100 MMOL/L (ref 98–107)
CO2 SERPL-SCNC: 26.4 MMOL/L (ref 22–29)
CREAT SERPL-MCNC: 0.87 MG/DL (ref 0.57–1)
DEPRECATED RDW RBC AUTO: 45.1 FL (ref 37–54)
EGFRCR SERPLBLD CKD-EPI 2021: 71.8 ML/MIN/1.73
EOSINOPHIL # BLD AUTO: 0.36 10*3/MM3 (ref 0–0.4)
EOSINOPHIL NFR BLD AUTO: 2.4 % (ref 0.3–6.2)
ERYTHROCYTE [DISTWIDTH] IN BLOOD BY AUTOMATED COUNT: 14.2 % (ref 12.3–15.4)
FLUAV RNA RESP QL NAA+PROBE: NOT DETECTED
FLUBV RNA RESP QL NAA+PROBE: NOT DETECTED
GEN 5 1HR TROPONIN T REFLEX: 12 NG/L
GLOBULIN UR ELPH-MCNC: 3.1 GM/DL
GLUCOSE SERPL-MCNC: 217 MG/DL (ref 65–99)
HCT VFR BLD AUTO: 42.2 % (ref 34–46.6)
HGB BLD-MCNC: 14.1 G/DL (ref 12–15.9)
IMM GRANULOCYTES # BLD AUTO: 0.04 10*3/MM3 (ref 0–0.05)
IMM GRANULOCYTES NFR BLD AUTO: 0.3 % (ref 0–0.5)
LYMPHOCYTES # BLD AUTO: 3.95 10*3/MM3 (ref 0.7–3.1)
LYMPHOCYTES NFR BLD AUTO: 26.6 % (ref 19.6–45.3)
MCH RBC QN AUTO: 29.3 PG (ref 26.6–33)
MCHC RBC AUTO-ENTMCNC: 33.4 G/DL (ref 31.5–35.7)
MCV RBC AUTO: 87.7 FL (ref 79–97)
MONOCYTES # BLD AUTO: 1.32 10*3/MM3 (ref 0.1–0.9)
MONOCYTES NFR BLD AUTO: 8.9 % (ref 5–12)
NEUTROPHILS NFR BLD AUTO: 61.7 % (ref 42.7–76)
NEUTROPHILS NFR BLD AUTO: 9.18 10*3/MM3 (ref 1.7–7)
NT-PROBNP SERPL-MCNC: 234 PG/ML (ref 0–900)
PLATELET # BLD AUTO: 437 10*3/MM3 (ref 140–450)
PMV BLD AUTO: 10.3 FL (ref 6–12)
POTASSIUM SERPL-SCNC: 3.8 MMOL/L (ref 3.5–5.2)
PROT SERPL-MCNC: 7 G/DL (ref 6–8.5)
RBC # BLD AUTO: 4.81 10*6/MM3 (ref 3.77–5.28)
RSV RNA RESP QL NAA+PROBE: NOT DETECTED
SARS-COV-2 RNA RESP QL NAA+PROBE: NOT DETECTED
SODIUM SERPL-SCNC: 140 MMOL/L (ref 136–145)
TROPONIN T NUMERIC DELTA: 2 NG/L
TROPONIN T SERPL HS-MCNC: 10 NG/L
WBC NRBC COR # BLD AUTO: 14.87 10*3/MM3 (ref 3.4–10.8)

## 2024-12-24 PROCEDURE — 94640 AIRWAY INHALATION TREATMENT: CPT

## 2024-12-24 PROCEDURE — 71275 CT ANGIOGRAPHY CHEST: CPT

## 2024-12-24 PROCEDURE — 25510000001 IOPAMIDOL PER 1 ML

## 2024-12-24 PROCEDURE — 25010000002 METHYLPREDNISOLONE PER 125 MG

## 2024-12-24 PROCEDURE — 87637 SARSCOV2&INF A&B&RSV AMP PRB: CPT

## 2024-12-24 PROCEDURE — 85025 COMPLETE CBC W/AUTO DIFF WBC: CPT

## 2024-12-24 PROCEDURE — 80053 COMPREHEN METABOLIC PANEL: CPT

## 2024-12-24 PROCEDURE — 84484 ASSAY OF TROPONIN QUANT: CPT

## 2024-12-24 PROCEDURE — 83880 ASSAY OF NATRIURETIC PEPTIDE: CPT

## 2024-12-24 PROCEDURE — 71046 X-RAY EXAM CHEST 2 VIEWS: CPT

## 2024-12-24 RX ORDER — IOPAMIDOL 755 MG/ML
100 INJECTION, SOLUTION INTRAVASCULAR
Status: COMPLETED | OUTPATIENT
Start: 2024-12-24 | End: 2024-12-24

## 2024-12-24 RX ORDER — IPRATROPIUM BROMIDE AND ALBUTEROL SULFATE 2.5; .5 MG/3ML; MG/3ML
6 SOLUTION RESPIRATORY (INHALATION) ONCE
Status: COMPLETED | OUTPATIENT
Start: 2024-12-24 | End: 2024-12-24

## 2024-12-24 RX ORDER — METHYLPREDNISOLONE SODIUM SUCCINATE 125 MG/2ML
125 INJECTION, POWDER, LYOPHILIZED, FOR SOLUTION INTRAMUSCULAR; INTRAVENOUS ONCE
Status: COMPLETED | OUTPATIENT
Start: 2024-12-24 | End: 2024-12-24

## 2024-12-24 RX ADMIN — METHYLPREDNISOLONE SODIUM SUCCINATE 125 MG: 125 INJECTION, POWDER, FOR SOLUTION INTRAMUSCULAR; INTRAVENOUS at 02:23

## 2024-12-24 RX ADMIN — IPRATROPIUM BROMIDE AND ALBUTEROL SULFATE 6 ML: .5; 3 SOLUTION RESPIRATORY (INHALATION) at 01:30

## 2024-12-24 RX ADMIN — IOPAMIDOL 100 ML: 755 INJECTION, SOLUTION INTRAVENOUS at 02:49

## 2024-12-24 NOTE — ED PROVIDER NOTES
Subjective   History of Present Illness    69-year-old female with a history of sleep apnea, hypertension, CAD, asthma, hypercholesterolemia, presenting to the emergency department for evaluation of cough.  Patient reports this has been going on for about a month, she was diagnosed with COVID earlier this month and got over it without issue, but had a persistent cough, and then over the last 2 days started coughing up brown sputum.  She denies any fevers.  No chest pain.  No leg swelling.  She is non-smoker.  Does not wear home oxygen except for CPAP at night.    Review of Systems    ROS as specified in HPI.      Past Medical History:   Diagnosis Date    Asthma     Coronary artery disease 2022    h/o CABG x3    Elevated cholesterol     Elevated liver enzymes     H/O seasonal allergies     Hiatal hernia     HLD (hyperlipidemia)     Hypertension     Obesity     NONI (obstructive sleep apnea)     compliant w/ CPAP    Shoulder dislocation 01/10/2024    left    Slow to wake up after anesthesia     Type 2 diabetes mellitus     on metformin at home       Allergies   Allergen Reactions    Ace Inhibitors Anaphylaxis    Latex Hives    Meloxicam GI Intolerance     Nausea       Past Surgical History:   Procedure Laterality Date    BREAST BIOPSY Left     CARDIAC CATHETERIZATION N/A 2022    Procedure: Left Heart Cath;  Surgeon: Ada Martinez MD;  Location: [facility] CATH INVASIVE LOCATION;  Service: Cardiology;  Laterality: N/A;    CARDIAC CATHETERIZATION N/A 2022    Procedure: Left ventriculography;  Surgeon: Ada Martinez MD;  [facility];  Service: Cardiology;  Laterality: N/A;    CARDIAC CATHETERIZATION N/A 2022    Procedure: Coronary angiography;  Surgeon: Ada Martinez MD;  Location: [facility] CATH INVASIVE LOCATION;  Service: Cardiology;  Laterality: N/A;     SECTION      CORONARY ARTERY BYPASS GRAFT N/A 2022    Procedure: MIDLINE STERNOTOMY, CORONARY ARTERY BYPASS  GRAFTING X3 , UTILIZING THE RIGHT SAPHENOUS VEIN AND LEFT SAM, RAINE, PRP;  Surgeon: Rodolfo Castellon MD;  [facility];  Service: Cardiothoracic;  Laterality: N/A;    CORONARY ARTERY BYPASS GRAFT  4/29/2022    Procedure: ;  Surgeon: Rodolfo Castellon MD;  Location: [facility] CVOR;  Service: Cardiothoracic;;    D & C HYSTEROSCOPY MYOSURE N/A 3/19/2024    Procedure: DILATATION AND CURETTAGE HYSTEROSCOPY with MYOSURE; POLYPECTOMY;  Surgeon: Sylvia Bhardwaj MD;  Location: [facility] OR;  Service: Obstetrics/Gynecology;  Laterality: N/A;  def - 205    DILATATION AND CURETTAGE      X2    UMBILICAL HERNIA REPAIR         Family History   Problem Relation Age of Onset    Breast cancer Paternal Aunt     Ovarian cancer Neg Hx     Uterine cancer Neg Hx     Colon cancer Neg Hx     Deep vein thrombosis Neg Hx     Pulmonary embolism Neg Hx     Malig Hyperthermia Neg Hx        Social History     Socioeconomic History    Marital status:    Tobacco Use    Smoking status: Never     Passive exposure: Never    Smokeless tobacco: Never    Tobacco comments:     no caffiene   Vaping Use    Vaping status: Never Used   Substance and Sexual Activity    Alcohol use: No    Drug use: No    Sexual activity: Not Currently     Birth control/protection: Post-menopausal           Objective   Physical Exam  Vitals reviewed.   Constitutional:       General: She is not in acute distress.     Appearance: She is normal weight. She is not toxic-appearing.   HENT:      Head: Normocephalic and atraumatic.      Nose: Nose normal. No congestion.      Mouth/Throat:      Mouth: Mucous membranes are moist.      Pharynx: Oropharynx is clear.   Eyes:      General: No scleral icterus.     Conjunctiva/sclera: Conjunctivae normal.   Cardiovascular:      Rate and Rhythm: Normal rate and regular rhythm.      Pulses: Normal pulses.      Heart sounds: No murmur heard.  Pulmonary:      Effort: Pulmonary effort is normal. No respiratory  distress.      Breath sounds: No wheezing, rhonchi or rales.   Abdominal:      General: There is no distension.      Palpations: Abdomen is soft.      Tenderness: There is no abdominal tenderness.   Musculoskeletal:         General: Normal range of motion.      Cervical back: Normal range of motion and neck supple. No rigidity.      Comments: Trace nonpitting edema bilateral lower extremities, which are warm and well-perfused.   Skin:     General: Skin is warm and dry.      Capillary Refill: Capillary refill takes less than 2 seconds.      Coloration: Skin is not jaundiced.   Neurological:      Mental Status: She is alert and oriented to person, place, and time. Mental status is at baseline.   Psychiatric:         Mood and Affect: Mood normal.         Behavior: Behavior normal.         Procedures           ED Course                                                       Medical Decision Making      Final diagnoses:   Subacute cough       ED Disposition  ED Disposition       ED Disposition   Discharge    Condition   Stable    Comment   --               Raza Chapman MD  [address] [facility] [address]  [place] [address]  [phone]    Call in 3 days           Medication List        Changed      atorvastatin 40 MG tablet  Commonly known as: LIPITOR  Take 1 tablet by mouth in the evening  What changed: when to take this              ED Course: 69-year-old female presenting to the emergency department for evaluation of cough.  This been going on for about a month after being diagnosed with COVID earlier in December.  Persistent, concerned about potential for developing pneumonia.  Borderline tachycardic, afebrile, blood pressure stable, borderline O2 sats between 92 to 94%.    Workup in the ER showed normal cardiac enzymes, normal BNP, negative COVID swab, mild leukocytosis of 14.87 with some neutrophilia.  Imaging showed no PE, no pulmonary vascular congestion, no pneumonia.    Given the above findings, suspect  patient's symptomatology is some degree of bronchitis, at this point I do not feel antibiotics are indicated.  Her O2 sats were borderline during her stay, she does have a history of sleep apnea and has not been on her CPAP machine tonight, likely also some degree of obesity hypoventilation contributing.    Potential need for admission was discussed with her however she prefers to be discharged home and will monitor her O2 sats, and will use her CPAP machine tonight.  Not unreasonable given that this borderline sats are likely chronic.  Patient be discharged home.    EKG: None    Consults: None    Incidental Findings: None         Terrell Lane,   12/25/24 0222

## 2024-12-24 NOTE — DISCHARGE INSTRUCTIONS
Monitor your  blood oxygen concentration with your home pulse oximeter.  Use your incentive spirometer, also use your albuterol inhaler every 6 hours.  Call your family doctor once their office is open after the holidays to discuss potentially getting a new home nebulizer.    Your cough is likely due to bronchitis, as there is no evidence of pneumonia on your CT scan, bronchitis tends to be self resolving and the duration is not decreased by steroids or antibiotics or cough medicines.    If your home oxygen saturations go below 92% persistently or you feel more short of breath, return to the emergency department immediately.  Also return for symptoms including but not limited to such as chest pain, uncontrolled fevers, abdominal pain, altered mental status, sweating.

## 2025-03-27 ENCOUNTER — OFFICE VISIT (OUTPATIENT)
Dept: CARDIOLOGY | Facility: CLINIC | Age: 70
End: 2025-03-27
Payer: MEDICARE

## 2025-03-27 VITALS
HEIGHT: 62 IN | HEART RATE: 70 BPM | SYSTOLIC BLOOD PRESSURE: 126 MMHG | BODY MASS INDEX: 43.34 KG/M2 | OXYGEN SATURATION: 95 % | DIASTOLIC BLOOD PRESSURE: 60 MMHG | WEIGHT: 235.5 LBS

## 2025-03-27 DIAGNOSIS — I10 ESSENTIAL HYPERTENSION: ICD-10-CM

## 2025-03-27 DIAGNOSIS — I25.810 CORONARY ARTERY DISEASE INVOLVING CORONARY BYPASS GRAFT OF NATIVE HEART WITHOUT ANGINA PECTORIS: Primary | ICD-10-CM

## 2025-03-27 DIAGNOSIS — G47.33 OBSTRUCTIVE SLEEP APNEA, ADULT: ICD-10-CM

## 2025-03-27 RX ORDER — CHLORTHALIDONE 25 MG/1
25 TABLET ORAL DAILY
Qty: 90 TABLET | Refills: 3 | Status: SHIPPED | OUTPATIENT
Start: 2025-03-27

## 2025-03-27 NOTE — PROGRESS NOTES
PATIENTINFORMATION    Date of Office Visit: 2025  Encounter Provider: Jon Guerrier MD  Place of Service: [facility] CARDIOLOGY  Patient Name: Scarlett Ray  : 1955    Subjective:     Encounter Date:2025      Patient ID: Scarlett Ray is a 69 y.o. female.    Chief Complaint   Patient presents with    Coronary Artery Disease       HPI  Ms. Ray is a pleasant  years old lady who came to cardiology clinic for follow-up visit.  She is compliant with all current medications without significant side effects and denies any ER visit or hospitalization since last clinic visit.  She denies any rest or exertional chest pain, shortness of breath, orthopnea, PND, palpitations, presyncope syncope or extremity swelling.   She does not exercise regularly but tries to be active    ROS  All systems reviewed and negative except as noted in HPI.    Past Medical History:   Diagnosis Date    Asthma     Coronary artery disease 2022    h/o CABG x3    Elevated cholesterol     Elevated liver enzymes     H/O seasonal allergies     Hiatal hernia     HLD (hyperlipidemia)     Hypertension     Obesity     NONI (obstructive sleep apnea)     compliant w/ CPAP    Shoulder dislocation 01/10/2024    left    Slow to wake up after anesthesia     Type 2 diabetes mellitus     on metformin at home       Past Surgical History:   Procedure Laterality Date    BREAST BIOPSY Left     CARDIAC CATHETERIZATION N/A 2022    Procedure: Left Heart Cath;  Surgeon: Ada Martinez MD;  Location: [facility] CATH INVASIVE LOCATION;  Service: Cardiology;  Laterality: N/A;    CARDIAC CATHETERIZATION N/A 2022    Procedure: Left ventriculography;  Surgeon: Aad Martinez MD;  [facility];  Service: Cardiology;  Laterality: N/A;    CARDIAC CATHETERIZATION N/A 2022    Procedure: Coronary angiography;  Surgeon: Ada Martinez MD;  [facility];  Service:  "Cardiology;  Laterality: N/A;     SECTION      CORONARY ARTERY BYPASS GRAFT N/A 2022    Procedure: MIDLINE STERNOTOMY, CORONARY ARTERY BYPASS GRAFTING X3 , UTILIZING THE RIGHT SAPHENOUS VEIN AND LEFT SAM, RAINE, PRP;  Surgeon: Rodolfo Castellon MD;  Location: [facility];  Service: Cardiothoracic;  Laterality: N/A;    CORONARY ARTERY BYPASS GRAFT  2022    Procedure: ;  Surgeon: Rodolfo Castellon MD;  Location: [facility];  Service: Cardiothoracic;;    D & C HYSTEROSCOPY MYOSURE N/A 3/19/2024    Procedure: DILATATION AND CURETTAGE HYSTEROSCOPY with MYOSURE; POLYPECTOMY;  Surgeon: Sylvia Bhardwaj MD;  Location: [facility];  Service: Obstetrics/Gynecology;  Laterality: N/A;  def - 205    DILATATION AND CURETTAGE      X2    UMBILICAL HERNIA REPAIR         Social History     Socioeconomic History    Marital status:    Tobacco Use    Smoking status: Never     Passive exposure: Never    Smokeless tobacco: Never    Tobacco comments:     no caffiene   Vaping Use    Vaping status: Never Used   Substance and Sexual Activity    Alcohol use: No    Drug use: No    Sexual activity: Not Currently     Birth control/protection: Post-menopausal       Family History   Problem Relation Age of Onset    Breast cancer Paternal Aunt     Ovarian cancer Neg Hx     Uterine cancer Neg Hx     Colon cancer Neg Hx     Deep vein thrombosis Neg Hx     Pulmonary embolism Neg Hx     Malig Hyperthermia Neg Hx          Procedures       Objective:     /60 (BP Location: Right arm, Patient Position: Sitting, Cuff Size: Large Adult)   Pulse 70   Ht 157.5 cm (62\")   Wt 107 kg (235 lb 8 oz)   SpO2 95%   BMI 43.07 kg/m²  Body mass index is 43.07 kg/m².     Constitutional:       General: Not in acute distress.     Appearance: Morbidly obese. Not diaphoretic.   Eyes:      Pupils: Pupils are equal, round, and reactive to light.   HENT:      Head: Normocephalic and atraumatic.   Neck:      Thyroid: No " thyromegaly.   Pulmonary:      Effort: Pulmonary effort is normal. No respiratory distress.      Breath sounds: Normal breath sounds. No wheezing. No rales.   Chest:      Chest wall: Not tender to palpatation.   Cardiovascular:      Normal rate. Regular rhythm.      No gallop.    Pulses:     Intact distal pulses.   Edema:     Peripheral edema absent.   Abdominal:      General: Bowel sounds are normal. There is no distension.      Palpations: Abdomen is soft.      Tenderness: There is no guarding.   Musculoskeletal: Normal range of motion.         General: No deformity.      Cervical back: Normal range of motion and neck supple. Skin:     General: Skin is warm and dry.      Findings: No rash.   Neurological:      Mental Status: Alert and oriented to person, place, and time.      Cranial Nerves: No cranial nerve deficit.      Deep Tendon Reflexes: Reflexes are normal and symmetric.   Psychiatric:         Judgment: Judgment normal.         Review Of Data: I have reviewed pertinent recent labs, images and documents and pertinent findings included in HPI or assessment below.      Assessment/Plan:      Multivessel coronary artery status post CABG x3: LIMA to LAD, SVG to OM1, SVG to OM 2 on 4/29/2022 after presenting with unstable angina. Preserved left ventricular ejection fraction by echocardiogram done in April 2022  Type 2 diabetes mellitus-on metformin-A1c at goal  Hypercholesterolemia-lipid panel at goal on high intensity statin  Essential hypertension-well-controlled  Morbidly obese with obstructive sleep apnea -compliant with CPAP  Bilateral leg and ankle edema-resolved    Ms. Ray is doing fairly well from body standpoint.  Blood pressure within range  I have encouraged her to walk as much as she can  Continue current care  Follow-up in 1 year or sooner with any concerning symptoms.    Diagnosis and plan of care discussed with patient and verbalized understanding.            Your medication list             Accurate as of March 27, 2025 12:04 PM. If you have any questions, ask your nurse or doctor.                CHANGE how you take these medications        Instructions Last Dose Given Next Dose Due   atorvastatin 40 MG tablet  Commonly known as: LIPITOR  What changed: when to take this      Take 1 tablet by mouth in the evening              CONTINUE taking these medications        Instructions Last Dose Given Next Dose Due   albuterol sulfate  (90 Base) MCG/ACT inhaler  Commonly known as: PROVENTIL HFA;VENTOLIN HFA;PROAIR HFA      Inhale 2 puffs Every 6 (Six) Hours As Needed for Shortness of Air or Wheezing.       aspirin 81 MG EC tablet      Take 1 tablet by mouth Daily.       aspirin-sod bicarb-citric acid 325 MG effervescent tablet  Commonly known as: SACHIN-SELTZER      Take 325 mg by mouth Every 6 (Six) Hours As Needed (indigestion).       Biotin 1000 MCG tablet      Take 1,000 mcg by mouth Daily.       calcium carbonate 600 MG tablet  Commonly known as: OS-NUSRAT      Take 1 tablet by mouth 2 (Two) Times a Day With Meals.       chlorthalidone 25 MG tablet  Commonly known as: HYGROTON      Take 1 tablet by mouth Daily.       cholecalciferol 25 MCG (1000 UT) tablet  Commonly known as: VITAMIN D3      Take 2 tablets by mouth Daily.       diphenhydrAMINE 25 mg capsule  Commonly known as: BENADRYL      Take 1 capsule by mouth Every 6 (Six) Hours As Needed for Itching.       metFORMIN  MG 24 hr tablet  Commonly known as: GLUCOPHAGE-XR      Take 1 tablet by mouth 2 (Two) Times a Day for 90 days.       metoprolol tartrate 50 MG tablet  Commonly known as: LOPRESSOR      Take 1 tablet by mouth 2 (Two) Times a Day for 90 days.                 Where to Get Your Medications        These medications were sent to [id number] - [place] - [address] - [phone]  - [phone] FX  [address], [place] [address]      Phone: [phone]   chlorthalidone 25 MG tablet             Jon  SAMY Guerrier MD  03/27/25  12:04 EDT

## 2025-07-23 ENCOUNTER — OFFICE VISIT (OUTPATIENT)
Dept: SURGERY | Facility: CLINIC | Age: 70
End: 2025-07-23
Payer: MEDICARE

## 2025-07-23 VITALS
HEIGHT: 62 IN | DIASTOLIC BLOOD PRESSURE: 72 MMHG | HEART RATE: 66 BPM | SYSTOLIC BLOOD PRESSURE: 136 MMHG | WEIGHT: 233.9 LBS | BODY MASS INDEX: 43.04 KG/M2 | OXYGEN SATURATION: 95 %

## 2025-07-23 DIAGNOSIS — L72.3 SEBACEOUS CYST: Primary | ICD-10-CM

## 2025-07-23 PROCEDURE — 88304 TISSUE EXAM BY PATHOLOGIST: CPT | Performed by: SURGERY

## 2025-07-23 RX ORDER — DIPHENHYDRAMINE HCL 25 MG
25 CAPSULE ORAL EVERY 6 HOURS PRN
COMMUNITY

## 2025-07-23 NOTE — PROGRESS NOTES
PROCEDURE NOTE  Patient: Scarlett Ray  YOB: 1955  MRN: [id number]  DATE OF PROCEDURE: 07/23/25     PREOPERATIVE DIAGNOSIS:  Sebaceous cyst, mid back    POSTOPERATIVE DIAGNOSIS:  Same    PROCEDURE:  Excision of sebaceous cyst of the mid back, 3.5 cm    FINDINGS:  3.5 cm sebaceous cyst of the mid back with no signs of active infection    SURGEON:  Fabián Gross MD    ANESTHESIA:  1% lidocaine with epinephrine    EBL:  25 mL    SPECIMEN:  Mid back sebaceous cyst    OPERATIVE DESCRIPTION:  The patient was brought to the procedure room and positioned appropriately. The patient was prepped and draped in the usual sterile fashion. Timeout was performed.     Local anesthetic was infiltrated.  Elliptical incision was created.  The cyst capsule was not violated.  Hemostasis obtained with the Hyfrecator.  Wound was closed in multiple layers of absorbable suture with Exofin applied.    The patient tolerated the procedure well.

## 2025-07-25 LAB
CYTO UR: NORMAL
LAB AP CASE REPORT: NORMAL
PATH REPORT.FINAL DX SPEC: NORMAL
PATH REPORT.GROSS SPEC: NORMAL

## 2025-08-18 ENCOUNTER — TRANSCRIBE ORDERS (OUTPATIENT)
Dept: ADMINISTRATIVE | Facility: HOSPITAL | Age: 70
End: 2025-08-18
Payer: MEDICARE

## 2025-08-18 DIAGNOSIS — Z12.31 VISIT FOR SCREENING MAMMOGRAM: Primary | ICD-10-CM

## (undated) DEVICE — Device

## (undated) DEVICE — BIOPATCH™ ANTIMICROBIAL DRESSING WITH CHLORHEXIDINE GLUCONATE IS A HYDROPHILLIC POLYURETHANE ABSORPTIVE FOAM WITH CHLORHEXIDINE GLUCONATE (CHG) WHICH INHIBITS BACTERIAL GROWTH UNDER THE DRESSING. THE DRESSING IS INTENDED TO BE USED TO ABSORB EXUDATE, COVER A WOUND CAUSED BY VASCULAR AND NONVASCULAR PERCUTANEOUS MEDICAL DEVICES DURING SURGERY, AS WELL AS REDUCE LOCAL INFECTION AND COLONIZATION OF MICROORGANISMS.: Brand: BIOPATCH

## (undated) DEVICE — PK CATH CARD 40

## (undated) DEVICE — DRN WND CH RND FUL/FLUT NO/TROC 3/8IN 28F

## (undated) DEVICE — PK ATS CUST W CARDIOTOMY RESEVOIR

## (undated) DEVICE — ROTATING SURGICAL PUNCHES, 1 PER POUCH: Brand: A&E MEDICAL / ROTATING SURGICAL PUNCHES

## (undated) DEVICE — SYS PERFUS SEP PLATLT W TIPS CUST

## (undated) DEVICE — HEMOCONCENTRATOR PERFUS LPS06

## (undated) DEVICE — CONN STR 1/2INX3/8IN

## (undated) DEVICE — SYS VASOVIEW HEMOPRO ENDOSCOPIC HARVST VESL

## (undated) DEVICE — BNDG ELAS ELITE V/CLOSE 4IN 5YD LF STRL

## (undated) DEVICE — BNDG ELAS ELITE V/CLOSE 6IN 5YD LF STRL

## (undated) DEVICE — TBG CONN INFLOW AQUILEX/MYOSURE

## (undated) DEVICE — 28 FR RIGHT ANGLE – SOFT PVC CATHETER: Brand: PVC THORACIC CATHETERS

## (undated) DEVICE — GLIDESHEATH SLENDER STAINLESS STEEL KIT: Brand: GLIDESHEATH SLENDER

## (undated) DEVICE — CATH VENT MIV RADL PIG ST TIP 5F 110CM

## (undated) DEVICE — CANN VESL FREE FLO 2MM

## (undated) DEVICE — 8 FOOT DISPOSABLE EXTENSION CABLE WITH SAFE CONNECT / ALLIGATOR CLIP

## (undated) DEVICE — TBG ART PRESS 60 IN

## (undated) DEVICE — OPTIFOAM GENTLE SA, POSTOP, 4X12: Brand: MEDLINE

## (undated) DEVICE — DRSNG WND BORDR/ADHS NONADHR/GZ LF 4X14IN STRL

## (undated) DEVICE — KT MANIFLD CARDIAC

## (undated) DEVICE — SCANLAN® VASCU-STATT® II SINGLE-USE BULLDOG CLAMP W/FIRMER CLAMPING PRESS - MIDI ANGLED 45° (YELLOW), CLAMPING PRESSURE 75-80 G (2/STERILE PKG): Brand: SCANLAN® VASCU-STATT® II SINGLE-USE BULLDOG CLAMP W/FIRMER CLAMPING PRESS

## (undated) DEVICE — PK PERFUS CUST W/CARDIOPLEGIA

## (undated) DEVICE — OASIS DRAIN, SINGLE, INLINE & ATS COMPATIBLE: Brand: OASIS

## (undated) DEVICE — CORONARY ARTERY BYPASS GRAFT MARKERS, STAINLESS STEEL, DISTAL, WITHOUT HOLDER: Brand: ANASTOMARK CORONARY ARTERY BYPASS GRAFT MARKERS, STAINLESS STEEL, DISTAL

## (undated) DEVICE — PK HEART OPN 40

## (undated) DEVICE — COLLECTION SOCK, GENERAL: Brand: DEROYAL

## (undated) DEVICE — DRP SLUSH WARMR MACH RECTG 66X44IN

## (undated) DEVICE — SPNG GZ WOVN 4X4IN 12PLY 10/BX STRL

## (undated) DEVICE — TEMP PACING WIRE: Brand: MYO/WIRE

## (undated) DEVICE — DEV TISS REMOV MYOSURE REACH

## (undated) DEVICE — LAG PERI GYN: Brand: MEDLINE INDUSTRIES, INC.

## (undated) DEVICE — CANN AORT ROOT DLP VNT 14G 7F

## (undated) DEVICE — GW EMR FIX EXCHG J STD .035 3MM 260CM

## (undated) DEVICE — CATH IV INSYTE AUTOGARD SHLD 20G 1IN PNK

## (undated) DEVICE — SENSR CERBRL O2 PK/2

## (undated) DEVICE — GLV SURG SIGNATURE ESSENTIAL PF LTX SZ7.5

## (undated) DEVICE — CANN ART EOPA 3D NV W/CONN 20F

## (undated) DEVICE — GLV SURG NEOPRN SENSICARE PF SZ/6.5 LF EA/1PR

## (undated) DEVICE — CVR PROB 96IN LF STRL

## (undated) DEVICE — SOL ISO/ALC RUB 70PCT 4OZ

## (undated) DEVICE — BG TRANSF W/COUPLER SPK 600ML

## (undated) DEVICE — TBG CONN OUTFLOW AQUILEX/MYOSURE

## (undated) DEVICE — CLAMP INSERT: Brand: STEALTH® CLAMP INSERT

## (undated) DEVICE — ST PERFUS M/

## (undated) DEVICE — BLOWER/MISTER AXIOUS OPCAB W/TBG

## (undated) DEVICE — ST TOURNI COMPL A/ 7IN

## (undated) DEVICE — TBG INSUFFLATION LUER LOCK: Brand: MEDLINE INDUSTRIES, INC.

## (undated) DEVICE — DRSNG SURESITE WNDW 2.38X2.75

## (undated) DEVICE — SOL IRR H2O BTL 1000ML STRL

## (undated) DEVICE — SEAL HYSTERSCOPE/OUTFLOW CHANNEL MYOSURE

## (undated) DEVICE — RADIFOCUS OPTITORQUE ANGIOGRAPHIC CATHETER: Brand: OPTITORQUE

## (undated) DEVICE — TR BAND RADIAL ARTERY COMPRESSION DEVICE: Brand: TR BAND

## (undated) DEVICE — ADHS SKIN SURG TISS VISC PREMIERPRO EXOFIN HI/VISC FAST/DRY

## (undated) DEVICE — ST. SORBAVIEW ULTIMATE IJ SYSTEM A,C: Brand: CENTURION

## (undated) DEVICE — AVID DUAL STAGE VENOUS DRAINAGE CANNULA: Brand: AVID DUAL STAGE VENOUS DRAINAGE CANNULA